# Patient Record
Sex: MALE | Race: OTHER | URBAN - METROPOLITAN AREA
[De-identification: names, ages, dates, MRNs, and addresses within clinical notes are randomized per-mention and may not be internally consistent; named-entity substitution may affect disease eponyms.]

---

## 2022-09-15 ENCOUNTER — EMERGENCY (EMERGENCY)
Facility: HOSPITAL | Age: 27
LOS: 0 days | Discharge: HOME | End: 2022-09-16
Attending: EMERGENCY MEDICINE | Admitting: EMERGENCY MEDICINE

## 2022-09-15 VITALS
HEART RATE: 80 BPM | TEMPERATURE: 98 F | DIASTOLIC BLOOD PRESSURE: 95 MMHG | RESPIRATION RATE: 18 BRPM | SYSTOLIC BLOOD PRESSURE: 133 MMHG | OXYGEN SATURATION: 100 %
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
## 2022-09-15 DIAGNOSIS — S80.211A ABRASION, RIGHT KNEE, INITIAL ENCOUNTER: ICD-10-CM

## 2022-09-15 DIAGNOSIS — S80.212A ABRASION, LEFT KNEE, INITIAL ENCOUNTER: ICD-10-CM
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
## 2022-09-15 DIAGNOSIS — S00.81XA ABRASION OF OTHER PART OF HEAD, INITIAL ENCOUNTER: ICD-10-CM
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
## 2022-09-15 DIAGNOSIS — S89.91XA UNSPECIFIED INJURY OF RIGHT LOWER LEG, INITIAL ENCOUNTER: ICD-10-CM

## 2022-09-15 DIAGNOSIS — S89.92XA UNSPECIFIED INJURY OF LEFT LOWER LEG, INITIAL ENCOUNTER: ICD-10-CM

## 2022-09-15 DIAGNOSIS — Z23 ENCOUNTER FOR IMMUNIZATION: ICD-10-CM

## 2022-09-15 PROCEDURE — 99284 EMERGENCY DEPT VISIT MOD MDM: CPT
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
## 2022-09-16 VITALS
HEART RATE: 83 BPM | TEMPERATURE: 98 F | OXYGEN SATURATION: 100 % | DIASTOLIC BLOOD PRESSURE: 88 MMHG | RESPIRATION RATE: 19 BRPM | SYSTOLIC BLOOD PRESSURE: 132 MMHG

## 2022-09-16 PROCEDURE — 73552 X-RAY EXAM OF FEMUR 2/>: CPT | Mod: 26,LT

## 2022-09-16 PROCEDURE — 73564 X-RAY EXAM KNEE 4 OR MORE: CPT | Mod: 26,50

## 2022-09-16 RX ORDER — TETANUS TOXOID, REDUCED DIPHTHERIA TOXOID AND ACELLULAR PERTUSSIS VACCINE, ADSORBED 5; 2.5; 8; 8; 2.5 [IU]/.5ML; [IU]/.5ML; UG/.5ML; UG/.5ML; UG/.5ML
0.5 SUSPENSION INTRAMUSCULAR ONCE
Refills: 0 | Status: COMPLETED | OUTPATIENT
Start: 2022-09-16 | End: 2022-09-16

## 2022-09-16 RX ADMIN — TETANUS TOXOID, REDUCED DIPHTHERIA TOXOID AND ACELLULAR PERTUSSIS VACCINE, ADSORBED 0.5 MILLILITER(S): 5; 2.5; 8; 8; 2.5 SUSPENSION INTRAMUSCULAR at 01:59

## 2022-09-16 NOTE — ED PROVIDER NOTE - CARE PROVIDER_API CALL
Tirso Dallas)  [id number]  [address] [place] [address]  Phone: [phone]  Fax: [phone]  Follow Up Time: Routine
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
## 2022-09-16 NOTE — ED PROVIDER NOTE - PHYSICAL EXAMINATION
CONSTITUTIONAL: Well-developed; well-nourished; in no acute distress.   SKIN: warm, dry. no rashes.  HEAD: Normocephalic; (+) abrasions to chin, no deep lacerations visualized. No scalp lacerations.  EYES: PERRLA, EOMI, no conjunctival erythema.  ENT: No nasal discharge; airway clear. MMM.  NECK: Supple; non tender. No c-spine ttp.  MSK/EXT: Normal ROM. No clubbing, cyanosis, or edema. No midline spinal tenderness to palpation. (+) abrasions to b/l knees, (+) ttp to b/l knees overlying abrasions and to L distal femur. NVI.  NEURO: Alert, oriented, grossly unremarkable. CNs 2-12 grossly intact. Normal motor, strength, and sensation. No focal neurological deficits.   PSYCH: Cooperative, appropriate.
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
## 2022-09-16 NOTE — ED PROVIDER NOTE - CLINICAL SUMMARY MEDICAL DECISION MAKING FREE TEXT BOX
pt evaluated s/p fall, no sutures required, imaging negative and Td updated. pt advised supportive care and follow up for evaluation and agreed. Strict return precautions advised and pt verbalized understanding.

## 2022-09-16 NOTE — ED PROVIDER NOTE - CARE PROVIDERS DIRECT ADDRESSES
[email or web address]

## 2022-09-16 NOTE — ED PROVIDER NOTE - CARE PLAN
Principal Discharge DX:	Fall  Secondary Diagnosis:	Abrasion of both knees  Secondary Diagnosis:	Abrasion of chin   1

## 2022-09-16 NOTE — ED PROVIDER NOTE - NSFOLLOWUPINSTRUCTIONS_ED_ALL_ED_FT

## 2022-09-16 NOTE — ED PROVIDER NOTE - NSFOLLOWUPCLINICS_GEN_ALL_ED_FT
[facility]  Medicine  [address], [place]   Phone: [phone]  Fax:   Follow Up Time: Routine

## 2022-09-16 NOTE — ED PROVIDER NOTE - ATTENDING CONTRIBUTION TO CARE
25 yo male presented for evaluation s/p fall. Pt reporting abrasion to chin and abrasions to knees bilaterally. Pt ambulatory without pain, no hip pain, back pain or neck pain. Denied any LOC< HA, dizziness or weakness. Pt states he was concerned he might need stitches prompting evaluation. Last Td unknown.    VITAL SIGNS: noted  CONSTITUTIONAL: Well-developed; well-nourished; in no acute distress  HEAD: Normocephalic; atraumatic  EYES: PERRL, EOM intact; conjunctiva and sclera clear  ENT: No nasal discharge; airway clear. MMM, jaw with FROM, no clicks  NECK: Supple; non tender. No anterior cervical lymphadenopathy noted  CARD: S1, S2 normal; no murmurs, gallops, or rubs. Regular rate and rhythm  RESP: CTAB/L, no wheezes, rales or rhonchi  ABD: Normal bowel sounds; soft; non-distended; non-tender; no hepatosplenomegaly. No CVA tenderness  EXT: Normal ROM. No calf tenderness or edema. Distal pulses intact  NEURO: Alert, oriented. Grossly unremarkable. No focal deficits  SKIN: Skin exam is warm and dry, abrasion noted to chin, no open laceration, bleeding controlled, no step off; + superficial abrasions to knee b/l without swelling, FROM  MS: No midline spinal tenderness

## 2022-09-16 NOTE — ED PROVIDER NOTE - PATIENT PORTAL LINK FT
You can access the FollowMyHealth Patient Portal offered by [facility] by registering at the following website: [email or web address]. By joining [facility]’s FollowMyHealth portal, you will also be able to view your health information using other applications (apps) compatible with our system.

## 2023-05-04 PROBLEM — Z00.00 ENCOUNTER FOR PREVENTIVE HEALTH EXAMINATION: Status: ACTIVE | Noted: 2023-05-04

## 2023-05-05 ENCOUNTER — APPOINTMENT (OUTPATIENT)
Dept: ORTHOPEDIC SURGERY | Facility: CLINIC | Age: 28
End: 2023-05-05

## 2023-11-15 ENCOUNTER — INPATIENT (INPATIENT)
Facility: HOSPITAL | Age: 28
LOS: 6 days | Discharge: HOME | DRG: 279 | End: 2023-11-22
Attending: STUDENT IN AN ORGANIZED HEALTH CARE EDUCATION/TRAINING PROGRAM | Admitting: STUDENT IN AN ORGANIZED HEALTH CARE EDUCATION/TRAINING PROGRAM
Payer: COMMERCIAL

## 2023-11-15 VITALS
DIASTOLIC BLOOD PRESSURE: 75 MMHG | TEMPERATURE: 98 F | WEIGHT: 169.98 LBS | OXYGEN SATURATION: 100 % | SYSTOLIC BLOOD PRESSURE: 127 MMHG | HEART RATE: 116 BPM | RESPIRATION RATE: 21 BRPM

## 2023-11-15 LAB
ALBUMIN SERPL ELPH-MCNC: 2.7 G/DL — LOW (ref 3.5–5.2)
ALBUMIN SERPL ELPH-MCNC: 2.7 G/DL — LOW (ref 3.5–5.2)
ALP SERPL-CCNC: 175 U/L — HIGH (ref 30–115)
ALP SERPL-CCNC: 175 U/L — HIGH (ref 30–115)
ALT FLD-CCNC: 15 U/L — SIGNIFICANT CHANGE UP (ref 0–41)
ALT FLD-CCNC: 15 U/L — SIGNIFICANT CHANGE UP (ref 0–41)
ANION GAP SERPL CALC-SCNC: 15 MMOL/L — HIGH (ref 7–14)
ANION GAP SERPL CALC-SCNC: 15 MMOL/L — HIGH (ref 7–14)
APPEARANCE UR: ABNORMAL
APPEARANCE UR: ABNORMAL
AST SERPL-CCNC: 158 U/L — HIGH (ref 0–41)
AST SERPL-CCNC: 158 U/L — HIGH (ref 0–41)
BACTERIA # UR AUTO: ABNORMAL /HPF
BACTERIA # UR AUTO: ABNORMAL /HPF
BASOPHILS # BLD AUTO: 0.06 K/UL — SIGNIFICANT CHANGE UP (ref 0–0.2)
BASOPHILS # BLD AUTO: 0.06 K/UL — SIGNIFICANT CHANGE UP (ref 0–0.2)
BASOPHILS NFR BLD AUTO: 0.4 % — SIGNIFICANT CHANGE UP (ref 0–1)
BASOPHILS NFR BLD AUTO: 0.4 % — SIGNIFICANT CHANGE UP (ref 0–1)
BILIRUB DIRECT SERPL-MCNC: >17.2 MG/DL — SIGNIFICANT CHANGE UP (ref 0–0.3)
BILIRUB DIRECT SERPL-MCNC: >17.2 MG/DL — SIGNIFICANT CHANGE UP (ref 0–0.3)
BILIRUB INDIRECT FLD-MCNC: SIGNIFICANT CHANGE UP MG/DL (ref 0.2–1.2)
BILIRUB INDIRECT FLD-MCNC: SIGNIFICANT CHANGE UP MG/DL (ref 0.2–1.2)
BILIRUB SERPL-MCNC: 31.1 MG/DL — CRITICAL HIGH (ref 0.2–1.2)
BILIRUB SERPL-MCNC: 31.1 MG/DL — CRITICAL HIGH (ref 0.2–1.2)
BILIRUB UR-MCNC: ABNORMAL
BILIRUB UR-MCNC: ABNORMAL
BUN SERPL-MCNC: 17 MG/DL — SIGNIFICANT CHANGE UP (ref 10–20)
BUN SERPL-MCNC: 17 MG/DL — SIGNIFICANT CHANGE UP (ref 10–20)
CALCIUM SERPL-MCNC: 8.1 MG/DL — LOW (ref 8.4–10.5)
CALCIUM SERPL-MCNC: 8.1 MG/DL — LOW (ref 8.4–10.5)
CAST: 0 /LPF — SIGNIFICANT CHANGE UP (ref 0–4)
CAST: 0 /LPF — SIGNIFICANT CHANGE UP (ref 0–4)
CHLORIDE SERPL-SCNC: 82 MMOL/L — LOW (ref 98–110)
CHLORIDE SERPL-SCNC: 82 MMOL/L — LOW (ref 98–110)
CO2 SERPL-SCNC: 22 MMOL/L — SIGNIFICANT CHANGE UP (ref 17–32)
CO2 SERPL-SCNC: 22 MMOL/L — SIGNIFICANT CHANGE UP (ref 17–32)
COLOR SPEC: SIGNIFICANT CHANGE UP
COLOR SPEC: SIGNIFICANT CHANGE UP
CREAT SERPL-MCNC: 0.7 MG/DL — SIGNIFICANT CHANGE UP (ref 0.7–1.5)
CREAT SERPL-MCNC: 0.7 MG/DL — SIGNIFICANT CHANGE UP (ref 0.7–1.5)
DIFF PNL FLD: NEGATIVE — SIGNIFICANT CHANGE UP
DIFF PNL FLD: NEGATIVE — SIGNIFICANT CHANGE UP
EGFR: 127 ML/MIN/1.73M2 — SIGNIFICANT CHANGE UP
EGFR: 127 ML/MIN/1.73M2 — SIGNIFICANT CHANGE UP
EOSINOPHIL # BLD AUTO: 0.11 K/UL — SIGNIFICANT CHANGE UP (ref 0–0.7)
EOSINOPHIL # BLD AUTO: 0.11 K/UL — SIGNIFICANT CHANGE UP (ref 0–0.7)
EOSINOPHIL NFR BLD AUTO: 0.7 % — SIGNIFICANT CHANGE UP (ref 0–8)
EOSINOPHIL NFR BLD AUTO: 0.7 % — SIGNIFICANT CHANGE UP (ref 0–8)
GLUCOSE SERPL-MCNC: 104 MG/DL — HIGH (ref 70–99)
GLUCOSE SERPL-MCNC: 104 MG/DL — HIGH (ref 70–99)
GLUCOSE UR QL: NEGATIVE MG/DL — SIGNIFICANT CHANGE UP
GLUCOSE UR QL: NEGATIVE MG/DL — SIGNIFICANT CHANGE UP
HCT VFR BLD CALC: 29.2 % — LOW (ref 42–52)
HCT VFR BLD CALC: 29.2 % — LOW (ref 42–52)
HGB BLD-MCNC: 10.7 G/DL — LOW (ref 14–18)
HGB BLD-MCNC: 10.7 G/DL — LOW (ref 14–18)
IMM GRANULOCYTES NFR BLD AUTO: 0.9 % — HIGH (ref 0.1–0.3)
IMM GRANULOCYTES NFR BLD AUTO: 0.9 % — HIGH (ref 0.1–0.3)
KETONES UR-MCNC: NEGATIVE MG/DL — SIGNIFICANT CHANGE UP
KETONES UR-MCNC: NEGATIVE MG/DL — SIGNIFICANT CHANGE UP
LEUKOCYTE ESTERASE UR-ACNC: ABNORMAL
LEUKOCYTE ESTERASE UR-ACNC: ABNORMAL
LYMPHOCYTES # BLD AUTO: 1.37 K/UL — SIGNIFICANT CHANGE UP (ref 1.2–3.4)
LYMPHOCYTES # BLD AUTO: 1.37 K/UL — SIGNIFICANT CHANGE UP (ref 1.2–3.4)
LYMPHOCYTES # BLD AUTO: 8.3 % — LOW (ref 20.5–51.1)
LYMPHOCYTES # BLD AUTO: 8.3 % — LOW (ref 20.5–51.1)
MCHC RBC-ENTMCNC: 34.7 PG — HIGH (ref 27–31)
MCHC RBC-ENTMCNC: 34.7 PG — HIGH (ref 27–31)
MCHC RBC-ENTMCNC: 36.6 G/DL — SIGNIFICANT CHANGE UP (ref 32–37)
MCHC RBC-ENTMCNC: 36.6 G/DL — SIGNIFICANT CHANGE UP (ref 32–37)
MCV RBC AUTO: 94.8 FL — HIGH (ref 80–94)
MCV RBC AUTO: 94.8 FL — HIGH (ref 80–94)
MONOCYTES # BLD AUTO: 1.37 K/UL — HIGH (ref 0.1–0.6)
MONOCYTES # BLD AUTO: 1.37 K/UL — HIGH (ref 0.1–0.6)
MONOCYTES NFR BLD AUTO: 8.3 % — SIGNIFICANT CHANGE UP (ref 1.7–9.3)
MONOCYTES NFR BLD AUTO: 8.3 % — SIGNIFICANT CHANGE UP (ref 1.7–9.3)
NEUTROPHILS # BLD AUTO: 13.43 K/UL — HIGH (ref 1.4–6.5)
NEUTROPHILS # BLD AUTO: 13.43 K/UL — HIGH (ref 1.4–6.5)
NEUTROPHILS NFR BLD AUTO: 81.4 % — HIGH (ref 42.2–75.2)
NEUTROPHILS NFR BLD AUTO: 81.4 % — HIGH (ref 42.2–75.2)
NITRITE UR-MCNC: POSITIVE
NITRITE UR-MCNC: POSITIVE
NRBC # BLD: 0 /100 WBCS — SIGNIFICANT CHANGE UP (ref 0–0)
NRBC # BLD: 0 /100 WBCS — SIGNIFICANT CHANGE UP (ref 0–0)
PH UR: 6 — SIGNIFICANT CHANGE UP (ref 5–8)
PH UR: 6 — SIGNIFICANT CHANGE UP (ref 5–8)
PLATELET # BLD AUTO: 241 K/UL — SIGNIFICANT CHANGE UP (ref 130–400)
PLATELET # BLD AUTO: 241 K/UL — SIGNIFICANT CHANGE UP (ref 130–400)
PMV BLD: 10.1 FL — SIGNIFICANT CHANGE UP (ref 7.4–10.4)
PMV BLD: 10.1 FL — SIGNIFICANT CHANGE UP (ref 7.4–10.4)
POTASSIUM SERPL-MCNC: 4.3 MMOL/L — SIGNIFICANT CHANGE UP (ref 3.5–5)
POTASSIUM SERPL-MCNC: 4.3 MMOL/L — SIGNIFICANT CHANGE UP (ref 3.5–5)
POTASSIUM SERPL-SCNC: 4.3 MMOL/L — SIGNIFICANT CHANGE UP (ref 3.5–5)
POTASSIUM SERPL-SCNC: 4.3 MMOL/L — SIGNIFICANT CHANGE UP (ref 3.5–5)
PROT SERPL-MCNC: 5.5 G/DL — LOW (ref 6–8)
PROT SERPL-MCNC: 5.5 G/DL — LOW (ref 6–8)
PROT UR-MCNC: 30 MG/DL
PROT UR-MCNC: 30 MG/DL
RBC # BLD: 3.08 M/UL — LOW (ref 4.7–6.1)
RBC # BLD: 3.08 M/UL — LOW (ref 4.7–6.1)
RBC # FLD: 19.7 % — HIGH (ref 11.5–14.5)
RBC # FLD: 19.7 % — HIGH (ref 11.5–14.5)
RBC CASTS # UR COMP ASSIST: 6 /HPF — HIGH (ref 0–4)
RBC CASTS # UR COMP ASSIST: 6 /HPF — HIGH (ref 0–4)
SODIUM SERPL-SCNC: 119 MMOL/L — CRITICAL LOW (ref 135–146)
SODIUM SERPL-SCNC: 119 MMOL/L — CRITICAL LOW (ref 135–146)
SP GR SPEC: >1.03 — HIGH (ref 1–1.03)
SP GR SPEC: >1.03 — HIGH (ref 1–1.03)
SQUAMOUS # UR AUTO: 3 /HPF — SIGNIFICANT CHANGE UP (ref 0–5)
SQUAMOUS # UR AUTO: 3 /HPF — SIGNIFICANT CHANGE UP (ref 0–5)
UROBILINOGEN FLD QL: 1 MG/DL — SIGNIFICANT CHANGE UP (ref 0.2–1)
UROBILINOGEN FLD QL: 1 MG/DL — SIGNIFICANT CHANGE UP (ref 0.2–1)
WBC # BLD: 16.49 K/UL — HIGH (ref 4.8–10.8)
WBC # BLD: 16.49 K/UL — HIGH (ref 4.8–10.8)
WBC # FLD AUTO: 16.49 K/UL — HIGH (ref 4.8–10.8)
WBC # FLD AUTO: 16.49 K/UL — HIGH (ref 4.8–10.8)
WBC UR QL: 0 /HPF — SIGNIFICANT CHANGE UP (ref 0–5)
WBC UR QL: 0 /HPF — SIGNIFICANT CHANGE UP (ref 0–5)

## 2023-11-15 PROCEDURE — 74177 CT ABD & PELVIS W/CONTRAST: CPT | Mod: 26,MA

## 2023-11-15 PROCEDURE — 76705 ECHO EXAM OF ABDOMEN: CPT | Mod: 26

## 2023-11-15 PROCEDURE — 71045 X-RAY EXAM CHEST 1 VIEW: CPT | Mod: 26

## 2023-11-15 PROCEDURE — 99285 EMERGENCY DEPT VISIT HI MDM: CPT

## 2023-11-15 NOTE — ED PROVIDER NOTE - CLINICAL SUMMARY MEDICAL DECISION MAKING FREE TEXT BOX
Laboratory results reviewed and discussed with patient. Laboratory results shows Leukocytosis and Elevated LFTs. Urine analysis shows +UTI.   Patient remained hemodynamically stable during the course of ED stay. Discussed with patient about the results of the diagnostic studies. Discussed with admitting physician and MAR, patient is admitted to Medicine for further evaluation and care.

## 2023-11-15 NOTE — ED ADULT NURSE NOTE - NSFALLHARMRISKINTERV_ED_ALL_ED

## 2023-11-15 NOTE — ED ADULT NURSE NOTE - OBJECTIVE STATEMENT
c/o c.o worsening abd distention and jaundice over last 2 weeks, PMH ETOH abuse, last drink was 3 weeks ago. pt states he has PMH HTN

## 2023-11-15 NOTE — ED PROVIDER NOTE - OBJECTIVE STATEMENT
28-year-old male with past medical history of HTN presents to the ED complaining of worsening jaundice and abdominal distention for 2 weeks.  Patient complains of associated shortness of breath that he attributes to his abdomen pushing up on his diaphragm.  Patient denies any pain.  Patient reports he has been drinking more water than usual but still feels that he is urinating small concentrated volumes.  Patient endorses a history of drinking alcohol every other day, but cut down to drinking only on weekends, and states his last drink was approximately 3 weeks ago.  Patient has not had any recent fever, headache, dizziness, chest pain, nausea, vomiting, or diarrhea.

## 2023-11-15 NOTE — ED PROVIDER NOTE - PHYSICAL EXAMINATION
PHYSICAL EXAM: I have reviewed current vital signs.  GENERAL: NAD, well-nourished; well-developed.  HEAD:  Normocephalic, atraumatic.  EYES: Scleral icterus.  ENT: MMM, no erythema/exudates.  NECK: Supple, no JVD.  CHEST/LUNG: Clear to auscultation bilaterally; no wheezes, rales, or rhonchi.  HEART: Regular rate and rhythm, normal S1 and S2; no murmurs, rubs, or gallops.  ABDOMEN: Liver palpated. Soft, nontender, nondistended.  EXTREMITIES:  2+ peripheral pulses; no clubbing, cyanosis, or edema.  PSYCH: Cooperative, appropriate, normal mood and affect.  NEUROLOGY: A&O x 3. No focal neurological deficits.   SKIN: Diffuse jaundice.

## 2023-11-15 NOTE — ED PROVIDER NOTE - DIFFERENTIAL DIAGNOSIS
Differential Diagnosis Pancreatitis, hepatic failure, obstructive uropathy, diverticulitis, colitis, abscess, bowel obstruction, bowel perforation. Electrolyte abnormalities, YANDY, and GI bleeding.

## 2023-11-15 NOTE — ED PROVIDER NOTE - CARE PLAN
1 Principal Discharge DX:	Jaundice  Secondary Diagnosis:	Hepatosplenomegaly  Secondary Diagnosis:	Hyponatremia

## 2023-11-16 ENCOUNTER — RESULT REVIEW (OUTPATIENT)
Age: 28
End: 2023-11-16

## 2023-11-16 DIAGNOSIS — R17 UNSPECIFIED JAUNDICE: ICD-10-CM

## 2023-11-16 LAB
ALBUMIN SERPL ELPH-MCNC: 2.4 G/DL — LOW (ref 3.5–5.2)
ALBUMIN SERPL ELPH-MCNC: 2.4 G/DL — LOW (ref 3.5–5.2)
ALBUMIN SERPL ELPH-MCNC: 2.5 G/DL — LOW (ref 3.5–5.2)
ALBUMIN SERPL ELPH-MCNC: 2.5 G/DL — LOW (ref 3.5–5.2)
ALP SERPL-CCNC: 162 U/L — HIGH (ref 30–115)
ALP SERPL-CCNC: 162 U/L — HIGH (ref 30–115)
ALP SERPL-CCNC: 165 U/L — HIGH (ref 30–115)
ALP SERPL-CCNC: 165 U/L — HIGH (ref 30–115)
ALT FLD-CCNC: 14 U/L — SIGNIFICANT CHANGE UP (ref 0–41)
ALT FLD-CCNC: 14 U/L — SIGNIFICANT CHANGE UP (ref 0–41)
ALT FLD-CCNC: 15 U/L — SIGNIFICANT CHANGE UP (ref 0–41)
ALT FLD-CCNC: 15 U/L — SIGNIFICANT CHANGE UP (ref 0–41)
ANION GAP SERPL CALC-SCNC: 13 MMOL/L — SIGNIFICANT CHANGE UP (ref 7–14)
ANION GAP SERPL CALC-SCNC: 15 MMOL/L — HIGH (ref 7–14)
ANION GAP SERPL CALC-SCNC: 15 MMOL/L — HIGH (ref 7–14)
ANION GAP SERPL CALC-SCNC: 16 MMOL/L — HIGH (ref 7–14)
ANION GAP SERPL CALC-SCNC: 16 MMOL/L — HIGH (ref 7–14)
APAP SERPL-MCNC: <5 UG/ML — LOW (ref 10–30)
APAP SERPL-MCNC: <5 UG/ML — LOW (ref 10–30)
APPEARANCE UR: ABNORMAL
APPEARANCE UR: ABNORMAL
APTT BLD: 40.1 SEC — HIGH (ref 27–39.2)
APTT BLD: 40.1 SEC — HIGH (ref 27–39.2)
APTT BLD: 41.8 SEC — HIGH (ref 27–39.2)
APTT BLD: 41.8 SEC — HIGH (ref 27–39.2)
AST SERPL-CCNC: 141 U/L — HIGH (ref 0–41)
AST SERPL-CCNC: 141 U/L — HIGH (ref 0–41)
AST SERPL-CCNC: 149 U/L — HIGH (ref 0–41)
AST SERPL-CCNC: 149 U/L — HIGH (ref 0–41)
B PERT IGG+IGM PNL SER: CLEAR — SIGNIFICANT CHANGE UP
B PERT IGG+IGM PNL SER: CLEAR — SIGNIFICANT CHANGE UP
BACTERIA # UR AUTO: NEGATIVE /HPF — SIGNIFICANT CHANGE UP
BACTERIA # UR AUTO: NEGATIVE /HPF — SIGNIFICANT CHANGE UP
BASOPHILS # BLD AUTO: 0.08 K/UL — SIGNIFICANT CHANGE UP (ref 0–0.2)
BASOPHILS # BLD AUTO: 0.08 K/UL — SIGNIFICANT CHANGE UP (ref 0–0.2)
BASOPHILS NFR BLD AUTO: 0.5 % — SIGNIFICANT CHANGE UP (ref 0–1)
BASOPHILS NFR BLD AUTO: 0.5 % — SIGNIFICANT CHANGE UP (ref 0–1)
BILIRUB DIRECT SERPL-MCNC: >17.2 MG/DL — HIGH (ref 0–0.3)
BILIRUB DIRECT SERPL-MCNC: >17.2 MG/DL — HIGH (ref 0–0.3)
BILIRUB DIRECT SERPL-MCNC: >17.2 MG/DL — SIGNIFICANT CHANGE UP (ref 0–0.3)
BILIRUB DIRECT SERPL-MCNC: >17.2 MG/DL — SIGNIFICANT CHANGE UP (ref 0–0.3)
BILIRUB INDIRECT FLD-MCNC: <12 MG/DL — HIGH (ref 0.2–1.2)
BILIRUB INDIRECT FLD-MCNC: <12 MG/DL — HIGH (ref 0.2–1.2)
BILIRUB INDIRECT FLD-MCNC: SIGNIFICANT CHANGE UP MG/DL (ref 0.2–1.2)
BILIRUB INDIRECT FLD-MCNC: SIGNIFICANT CHANGE UP MG/DL (ref 0.2–1.2)
BILIRUB SERPL-MCNC: 29.2 MG/DL — CRITICAL HIGH (ref 0.2–1.2)
BILIRUB SERPL-MCNC: 29.2 MG/DL — CRITICAL HIGH (ref 0.2–1.2)
BILIRUB SERPL-MCNC: 29.6 MG/DL — CRITICAL HIGH (ref 0.2–1.2)
BILIRUB UR-MCNC: ABNORMAL
BILIRUB UR-MCNC: ABNORMAL
BLD GP AB SCN SERPL QL: SIGNIFICANT CHANGE UP
BLD GP AB SCN SERPL QL: SIGNIFICANT CHANGE UP
BUN SERPL-MCNC: 19 MG/DL — SIGNIFICANT CHANGE UP (ref 10–20)
BUN SERPL-MCNC: 21 MG/DL — HIGH (ref 10–20)
BUN SERPL-MCNC: 21 MG/DL — HIGH (ref 10–20)
BUN SERPL-MCNC: 22 MG/DL — HIGH (ref 10–20)
BUN SERPL-MCNC: 22 MG/DL — HIGH (ref 10–20)
CALCIUM SERPL-MCNC: 7.9 MG/DL — LOW (ref 8.4–10.5)
CALCIUM SERPL-MCNC: 7.9 MG/DL — LOW (ref 8.4–10.5)
CALCIUM SERPL-MCNC: 8 MG/DL — LOW (ref 8.4–10.4)
CALCIUM SERPL-MCNC: 8 MG/DL — LOW (ref 8.4–10.5)
CALCIUM SERPL-MCNC: 8 MG/DL — LOW (ref 8.4–10.5)
CAST: 0 /LPF — SIGNIFICANT CHANGE UP (ref 0–4)
CAST: 0 /LPF — SIGNIFICANT CHANGE UP (ref 0–4)
CHLORIDE SERPL-SCNC: 86 MMOL/L — LOW (ref 98–110)
CHLORIDE SERPL-SCNC: 86 MMOL/L — LOW (ref 98–110)
CHLORIDE SERPL-SCNC: 87 MMOL/L — LOW (ref 98–110)
CO2 SERPL-SCNC: 22 MMOL/L — SIGNIFICANT CHANGE UP (ref 17–32)
CO2 SERPL-SCNC: 23 MMOL/L — SIGNIFICANT CHANGE UP (ref 17–32)
CO2 SERPL-SCNC: 23 MMOL/L — SIGNIFICANT CHANGE UP (ref 17–32)
COLOR FLD: SIGNIFICANT CHANGE UP
COLOR FLD: SIGNIFICANT CHANGE UP
COLOR SPEC: SIGNIFICANT CHANGE UP
COLOR SPEC: SIGNIFICANT CHANGE UP
CREAT SERPL-MCNC: 1 MG/DL — SIGNIFICANT CHANGE UP (ref 0.7–1.5)
CREAT SERPL-MCNC: 1.2 MG/DL — SIGNIFICANT CHANGE UP (ref 0.7–1.5)
CREAT SERPL-MCNC: 1.2 MG/DL — SIGNIFICANT CHANGE UP (ref 0.7–1.5)
DIFF PNL FLD: NEGATIVE — SIGNIFICANT CHANGE UP
DIFF PNL FLD: NEGATIVE — SIGNIFICANT CHANGE UP
EGFR: 104 ML/MIN/1.73M2 — SIGNIFICANT CHANGE UP
EGFR: 83 ML/MIN/1.73M2 — SIGNIFICANT CHANGE UP
EGFR: 83 ML/MIN/1.73M2 — SIGNIFICANT CHANGE UP
EOSINOPHIL # BLD AUTO: 0.13 K/UL — SIGNIFICANT CHANGE UP (ref 0–0.7)
EOSINOPHIL # BLD AUTO: 0.13 K/UL — SIGNIFICANT CHANGE UP (ref 0–0.7)
EOSINOPHIL NFR BLD AUTO: 0.8 % — SIGNIFICANT CHANGE UP (ref 0–8)
EOSINOPHIL NFR BLD AUTO: 0.8 % — SIGNIFICANT CHANGE UP (ref 0–8)
ETHANOL SERPL-MCNC: <10 MG/DL — SIGNIFICANT CHANGE UP
ETHANOL SERPL-MCNC: <10 MG/DL — SIGNIFICANT CHANGE UP
FLUID INTAKE SUBSTANCE CLASS: SIGNIFICANT CHANGE UP
FLUID INTAKE SUBSTANCE CLASS: SIGNIFICANT CHANGE UP
GLUCOSE SERPL-MCNC: 107 MG/DL — HIGH (ref 70–99)
GLUCOSE SERPL-MCNC: 107 MG/DL — HIGH (ref 70–99)
GLUCOSE SERPL-MCNC: 112 MG/DL — HIGH (ref 70–99)
GLUCOSE SERPL-MCNC: 112 MG/DL — HIGH (ref 70–99)
GLUCOSE SERPL-MCNC: 92 MG/DL — SIGNIFICANT CHANGE UP (ref 70–99)
GLUCOSE SERPL-MCNC: 92 MG/DL — SIGNIFICANT CHANGE UP (ref 70–99)
GLUCOSE SERPL-MCNC: 95 MG/DL — SIGNIFICANT CHANGE UP (ref 70–99)
GLUCOSE SERPL-MCNC: 95 MG/DL — SIGNIFICANT CHANGE UP (ref 70–99)
GLUCOSE UR QL: NEGATIVE MG/DL — SIGNIFICANT CHANGE UP
GLUCOSE UR QL: NEGATIVE MG/DL — SIGNIFICANT CHANGE UP
HCT VFR BLD CALC: 27.5 % — LOW (ref 42–52)
HCT VFR BLD CALC: 27.5 % — LOW (ref 42–52)
HGB BLD-MCNC: 10 G/DL — LOW (ref 14–18)
HGB BLD-MCNC: 10 G/DL — LOW (ref 14–18)
IMM GRANULOCYTES NFR BLD AUTO: 1.2 % — HIGH (ref 0.1–0.3)
IMM GRANULOCYTES NFR BLD AUTO: 1.2 % — HIGH (ref 0.1–0.3)
INR BLD: 2.49 RATIO — HIGH (ref 0.65–1.3)
INR BLD: 2.49 RATIO — HIGH (ref 0.65–1.3)
INR BLD: 2.69 RATIO — HIGH (ref 0.65–1.3)
INR BLD: 2.69 RATIO — HIGH (ref 0.65–1.3)
IRON SATN MFR SERPL: 64 UG/DL — SIGNIFICANT CHANGE UP (ref 35–150)
IRON SATN MFR SERPL: 64 UG/DL — SIGNIFICANT CHANGE UP (ref 35–150)
KETONES UR-MCNC: NEGATIVE MG/DL — SIGNIFICANT CHANGE UP
KETONES UR-MCNC: NEGATIVE MG/DL — SIGNIFICANT CHANGE UP
LACTATE SERPL-SCNC: 1.1 MMOL/L — SIGNIFICANT CHANGE UP (ref 0.7–2)
LACTATE SERPL-SCNC: 1.1 MMOL/L — SIGNIFICANT CHANGE UP (ref 0.7–2)
LEUKOCYTE ESTERASE UR-ACNC: ABNORMAL
LEUKOCYTE ESTERASE UR-ACNC: ABNORMAL
LYMPHOCYTES # BLD AUTO: 1.28 K/UL — SIGNIFICANT CHANGE UP (ref 1.2–3.4)
LYMPHOCYTES # BLD AUTO: 1.28 K/UL — SIGNIFICANT CHANGE UP (ref 1.2–3.4)
LYMPHOCYTES # BLD AUTO: 8.3 % — LOW (ref 20.5–51.1)
LYMPHOCYTES # BLD AUTO: 8.3 % — LOW (ref 20.5–51.1)
LYMPHOCYTES # FLD: 12 — SIGNIFICANT CHANGE UP
LYMPHOCYTES # FLD: 12 — SIGNIFICANT CHANGE UP
MAGNESIUM SERPL-MCNC: 2.5 MG/DL — HIGH (ref 1.8–2.4)
MAGNESIUM SERPL-MCNC: 2.5 MG/DL — HIGH (ref 1.8–2.4)
MAGNESIUM SERPL-MCNC: 2.6 MG/DL — HIGH (ref 1.8–2.4)
MAGNESIUM SERPL-MCNC: 2.6 MG/DL — HIGH (ref 1.8–2.4)
MCHC RBC-ENTMCNC: 34.6 PG — HIGH (ref 27–31)
MCHC RBC-ENTMCNC: 34.6 PG — HIGH (ref 27–31)
MCHC RBC-ENTMCNC: 36.4 G/DL — SIGNIFICANT CHANGE UP (ref 32–37)
MCHC RBC-ENTMCNC: 36.4 G/DL — SIGNIFICANT CHANGE UP (ref 32–37)
MCV RBC AUTO: 95.2 FL — HIGH (ref 80–94)
MCV RBC AUTO: 95.2 FL — HIGH (ref 80–94)
MESOTHL CELL # FLD: 1 % — SIGNIFICANT CHANGE UP
MESOTHL CELL # FLD: 1 % — SIGNIFICANT CHANGE UP
MONOCYTES # BLD AUTO: 1.32 K/UL — HIGH (ref 0.1–0.6)
MONOCYTES # BLD AUTO: 1.32 K/UL — HIGH (ref 0.1–0.6)
MONOCYTES NFR BLD AUTO: 8.6 % — SIGNIFICANT CHANGE UP (ref 1.7–9.3)
MONOCYTES NFR BLD AUTO: 8.6 % — SIGNIFICANT CHANGE UP (ref 1.7–9.3)
MONOS+MACROS # FLD: 84 % — SIGNIFICANT CHANGE UP
MONOS+MACROS # FLD: 84 % — SIGNIFICANT CHANGE UP
NEUTROPHILS # BLD AUTO: 12.39 K/UL — HIGH (ref 1.4–6.5)
NEUTROPHILS # BLD AUTO: 12.39 K/UL — HIGH (ref 1.4–6.5)
NEUTROPHILS NFR BLD AUTO: 80.6 % — HIGH (ref 42.2–75.2)
NEUTROPHILS NFR BLD AUTO: 80.6 % — HIGH (ref 42.2–75.2)
NEUTROPHILS-BODY FLUID: 3 % — SIGNIFICANT CHANGE UP
NEUTROPHILS-BODY FLUID: 3 % — SIGNIFICANT CHANGE UP
NITRITE UR-MCNC: POSITIVE
NITRITE UR-MCNC: POSITIVE
NRBC # BLD: 0 /100 WBCS — SIGNIFICANT CHANGE UP (ref 0–0)
NRBC # BLD: 0 /100 WBCS — SIGNIFICANT CHANGE UP (ref 0–0)
OSMOLALITY SERPL: 264 MOS/KG — LOW (ref 275–300)
OSMOLALITY SERPL: 264 MOS/KG — LOW (ref 275–300)
OSMOLALITY UR: 616 MOS/KG — SIGNIFICANT CHANGE UP (ref 50–1200)
OSMOLALITY UR: 616 MOS/KG — SIGNIFICANT CHANGE UP (ref 50–1200)
PH UR: 6 — SIGNIFICANT CHANGE UP (ref 5–8)
PH UR: 6 — SIGNIFICANT CHANGE UP (ref 5–8)
PHOSPHATE SERPL-MCNC: 3.1 MG/DL — SIGNIFICANT CHANGE UP (ref 2.1–4.9)
PHOSPHATE SERPL-MCNC: 3.1 MG/DL — SIGNIFICANT CHANGE UP (ref 2.1–4.9)
PHOSPHATE SERPL-MCNC: 3.5 MG/DL — SIGNIFICANT CHANGE UP (ref 2.1–4.9)
PHOSPHATE SERPL-MCNC: 3.5 MG/DL — SIGNIFICANT CHANGE UP (ref 2.1–4.9)
PLATELET # BLD AUTO: 227 K/UL — SIGNIFICANT CHANGE UP (ref 130–400)
PLATELET # BLD AUTO: 227 K/UL — SIGNIFICANT CHANGE UP (ref 130–400)
PMV BLD: 9.7 FL — SIGNIFICANT CHANGE UP (ref 7.4–10.4)
PMV BLD: 9.7 FL — SIGNIFICANT CHANGE UP (ref 7.4–10.4)
POTASSIUM SERPL-MCNC: 3.8 MMOL/L — SIGNIFICANT CHANGE UP (ref 3.5–5)
POTASSIUM SERPL-MCNC: 3.8 MMOL/L — SIGNIFICANT CHANGE UP (ref 3.5–5)
POTASSIUM SERPL-MCNC: 3.9 MMOL/L — SIGNIFICANT CHANGE UP (ref 3.5–5)
POTASSIUM SERPL-MCNC: 4 MMOL/L — SIGNIFICANT CHANGE UP (ref 3.5–5)
POTASSIUM SERPL-MCNC: 4 MMOL/L — SIGNIFICANT CHANGE UP (ref 3.5–5)
POTASSIUM SERPL-SCNC: 3.8 MMOL/L — SIGNIFICANT CHANGE UP (ref 3.5–5)
POTASSIUM SERPL-SCNC: 3.8 MMOL/L — SIGNIFICANT CHANGE UP (ref 3.5–5)
POTASSIUM SERPL-SCNC: 3.9 MMOL/L — SIGNIFICANT CHANGE UP (ref 3.5–5)
POTASSIUM SERPL-SCNC: 4 MMOL/L — SIGNIFICANT CHANGE UP (ref 3.5–5)
POTASSIUM SERPL-SCNC: 4 MMOL/L — SIGNIFICANT CHANGE UP (ref 3.5–5)
POTASSIUM UR-SCNC: 40 MMOL/L — SIGNIFICANT CHANGE UP
POTASSIUM UR-SCNC: 40 MMOL/L — SIGNIFICANT CHANGE UP
PROT SERPL-MCNC: 5.1 G/DL — LOW (ref 6–8)
PROT SERPL-MCNC: 5.1 G/DL — LOW (ref 6–8)
PROT SERPL-MCNC: 5.3 G/DL — LOW (ref 6–8)
PROT SERPL-MCNC: 5.3 G/DL — LOW (ref 6–8)
PROT UR-MCNC: 30 MG/DL
PROT UR-MCNC: 30 MG/DL
PROTHROM AB SERPL-ACNC: 28.6 SEC — HIGH (ref 9.95–12.87)
PROTHROM AB SERPL-ACNC: 28.6 SEC — HIGH (ref 9.95–12.87)
PROTHROM AB SERPL-ACNC: 31 SEC — HIGH (ref 9.95–12.87)
PROTHROM AB SERPL-ACNC: 31 SEC — HIGH (ref 9.95–12.87)
RBC # BLD: 2.89 M/UL — LOW (ref 4.7–6.1)
RBC # FLD: 19.7 % — HIGH (ref 11.5–14.5)
RBC # FLD: 19.7 % — HIGH (ref 11.5–14.5)
RBC CASTS # UR COMP ASSIST: 4 /HPF — SIGNIFICANT CHANGE UP (ref 0–4)
RBC CASTS # UR COMP ASSIST: 4 /HPF — SIGNIFICANT CHANGE UP (ref 0–4)
RCV VOL RI: 0 /UL — SIGNIFICANT CHANGE UP (ref 0–0)
RCV VOL RI: 0 /UL — SIGNIFICANT CHANGE UP (ref 0–0)
RETICS #: 125.7 K/UL — HIGH (ref 25–125)
RETICS #: 125.7 K/UL — HIGH (ref 25–125)
RETICS/RBC NFR: 4.4 % — HIGH (ref 0.5–1.5)
RETICS/RBC NFR: 4.4 % — HIGH (ref 0.5–1.5)
SALICYLATES SERPL-MCNC: <0.3 MG/DL — LOW (ref 4–30)
SALICYLATES SERPL-MCNC: <0.3 MG/DL — LOW (ref 4–30)
SODIUM SERPL-SCNC: 122 MMOL/L — LOW (ref 135–146)
SODIUM SERPL-SCNC: 124 MMOL/L — LOW (ref 135–146)
SODIUM SERPL-SCNC: 124 MMOL/L — LOW (ref 135–146)
SODIUM SERPL-SCNC: 125 MMOL/L — LOW (ref 135–146)
SODIUM SERPL-SCNC: 125 MMOL/L — LOW (ref 135–146)
SODIUM UR-SCNC: 20 MMOL/L — SIGNIFICANT CHANGE UP
SODIUM UR-SCNC: 20 MMOL/L — SIGNIFICANT CHANGE UP
SP GR SPEC: >1.03 — HIGH (ref 1–1.03)
SP GR SPEC: >1.03 — HIGH (ref 1–1.03)
SQUAMOUS # UR AUTO: 0 /HPF — SIGNIFICANT CHANGE UP (ref 0–5)
SQUAMOUS # UR AUTO: 0 /HPF — SIGNIFICANT CHANGE UP (ref 0–5)
TOTAL NUCLEATED CELL COUNT, BODY FLUID: 81 /UL — SIGNIFICANT CHANGE UP
TOTAL NUCLEATED CELL COUNT, BODY FLUID: 81 /UL — SIGNIFICANT CHANGE UP
TSH SERPL-MCNC: 1.96 UIU/ML — SIGNIFICANT CHANGE UP (ref 0.27–4.2)
TSH SERPL-MCNC: 1.96 UIU/ML — SIGNIFICANT CHANGE UP (ref 0.27–4.2)
TUBE TYPE: SIGNIFICANT CHANGE UP
TUBE TYPE: SIGNIFICANT CHANGE UP
UROBILINOGEN FLD QL: 1 MG/DL — SIGNIFICANT CHANGE UP (ref 0.2–1)
UROBILINOGEN FLD QL: 1 MG/DL — SIGNIFICANT CHANGE UP (ref 0.2–1)
WBC # BLD: 15.38 K/UL — HIGH (ref 4.8–10.8)
WBC # BLD: 15.38 K/UL — HIGH (ref 4.8–10.8)
WBC # FLD AUTO: 15.38 K/UL — HIGH (ref 4.8–10.8)
WBC # FLD AUTO: 15.38 K/UL — HIGH (ref 4.8–10.8)
WBC UR QL: 2 /HPF — SIGNIFICANT CHANGE UP (ref 0–5)
WBC UR QL: 2 /HPF — SIGNIFICANT CHANGE UP (ref 0–5)

## 2023-11-16 PROCEDURE — 83935 ASSAY OF URINE OSMOLALITY: CPT

## 2023-11-16 PROCEDURE — 84133 ASSAY OF URINE POTASSIUM: CPT

## 2023-11-16 PROCEDURE — 87070 CULTURE OTHR SPECIMN AEROBIC: CPT

## 2023-11-16 PROCEDURE — 76770 US EXAM ABDO BACK WALL COMP: CPT

## 2023-11-16 PROCEDURE — 82390 ASSAY OF CERULOPLASMIN: CPT

## 2023-11-16 PROCEDURE — 86645 CMV ANTIBODY IGM: CPT

## 2023-11-16 PROCEDURE — 87075 CULTR BACTERIA EXCEPT BLOOD: CPT

## 2023-11-16 PROCEDURE — 82784 ASSAY IGA/IGD/IGG/IGM EACH: CPT

## 2023-11-16 PROCEDURE — 82247 BILIRUBIN TOTAL: CPT

## 2023-11-16 PROCEDURE — 86038 ANTINUCLEAR ANTIBODIES: CPT

## 2023-11-16 PROCEDURE — 84466 ASSAY OF TRANSFERRIN: CPT

## 2023-11-16 PROCEDURE — 86900 BLOOD TYPING SEROLOGIC ABO: CPT

## 2023-11-16 PROCEDURE — 87521 HEPATITIS C PROBE&RVRS TRNSC: CPT

## 2023-11-16 PROCEDURE — 86735 MUMPS ANTIBODY: CPT

## 2023-11-16 PROCEDURE — 85610 PROTHROMBIN TIME: CPT

## 2023-11-16 PROCEDURE — 83520 IMMUNOASSAY QUANT NOS NONAB: CPT

## 2023-11-16 PROCEDURE — 84300 ASSAY OF URINE SODIUM: CPT

## 2023-11-16 PROCEDURE — 87102 FUNGUS ISOLATION CULTURE: CPT

## 2023-11-16 PROCEDURE — 86376 MICROSOMAL ANTIBODY EACH: CPT

## 2023-11-16 PROCEDURE — 88305 TISSUE EXAM BY PATHOLOGIST: CPT | Mod: 26

## 2023-11-16 PROCEDURE — 84443 ASSAY THYROID STIM HORMONE: CPT

## 2023-11-16 PROCEDURE — 87205 SMEAR GRAM STAIN: CPT

## 2023-11-16 PROCEDURE — 86708 HEPATITIS A ANTIBODY: CPT

## 2023-11-16 PROCEDURE — 84100 ASSAY OF PHOSPHORUS: CPT

## 2023-11-16 PROCEDURE — 81001 URINALYSIS AUTO W/SCOPE: CPT

## 2023-11-16 PROCEDURE — 82607 VITAMIN B-12: CPT

## 2023-11-16 PROCEDURE — 80321 ALCOHOLS BIOMARKERS 1OR 2: CPT

## 2023-11-16 PROCEDURE — 86381 MITOCHONDRIAL ANTIBODY EACH: CPT

## 2023-11-16 PROCEDURE — 86777 TOXOPLASMA ANTIBODY: CPT

## 2023-11-16 PROCEDURE — 85730 THROMBOPLASTIN TIME PARTIAL: CPT

## 2023-11-16 PROCEDURE — 88305 TISSUE EXAM BY PATHOLOGIST: CPT

## 2023-11-16 PROCEDURE — 83605 ASSAY OF LACTIC ACID: CPT

## 2023-11-16 PROCEDURE — 85025 COMPLETE CBC W/AUTO DIFF WBC: CPT

## 2023-11-16 PROCEDURE — 88112 CYTOPATH CELL ENHANCE TECH: CPT

## 2023-11-16 PROCEDURE — 80307 DRUG TEST PRSMV CHEM ANLYZR: CPT

## 2023-11-16 PROCEDURE — 82042 OTHER SOURCE ALBUMIN QUAN EA: CPT

## 2023-11-16 PROCEDURE — 89051 BODY FLUID CELL COUNT: CPT

## 2023-11-16 PROCEDURE — 85027 COMPLETE CBC AUTOMATED: CPT

## 2023-11-16 PROCEDURE — 84156 ASSAY OF PROTEIN URINE: CPT

## 2023-11-16 PROCEDURE — 99223 1ST HOSP IP/OBS HIGH 75: CPT

## 2023-11-16 PROCEDURE — 86901 BLOOD TYPING SEROLOGIC RH(D): CPT

## 2023-11-16 PROCEDURE — 84145 PROCALCITONIN (PCT): CPT

## 2023-11-16 PROCEDURE — 86644 CMV ANTIBODY: CPT

## 2023-11-16 PROCEDURE — 80076 HEPATIC FUNCTION PANEL: CPT

## 2023-11-16 PROCEDURE — 86663 EPSTEIN-BARR ANTIBODY: CPT

## 2023-11-16 PROCEDURE — 80053 COMPREHEN METABOLIC PANEL: CPT

## 2023-11-16 PROCEDURE — 82728 ASSAY OF FERRITIN: CPT

## 2023-11-16 PROCEDURE — 83930 ASSAY OF BLOOD OSMOLALITY: CPT

## 2023-11-16 PROCEDURE — 83540 ASSAY OF IRON: CPT

## 2023-11-16 PROCEDURE — 82962 GLUCOSE BLOOD TEST: CPT

## 2023-11-16 PROCEDURE — 86790 VIRUS ANTIBODY NOS: CPT

## 2023-11-16 PROCEDURE — 87040 BLOOD CULTURE FOR BACTERIA: CPT

## 2023-11-16 PROCEDURE — 86778 TOXOPLASMA ANTIBODY IGM: CPT

## 2023-11-16 PROCEDURE — 86706 HEP B SURFACE ANTIBODY: CPT

## 2023-11-16 PROCEDURE — 86765 RUBEOLA ANTIBODY: CPT

## 2023-11-16 PROCEDURE — 82140 ASSAY OF AMMONIA: CPT

## 2023-11-16 PROCEDURE — 82945 GLUCOSE OTHER FLUID: CPT

## 2023-11-16 PROCEDURE — 76700 US EXAM ABDOM COMPLETE: CPT

## 2023-11-16 PROCEDURE — 80074 ACUTE HEPATITIS PANEL: CPT

## 2023-11-16 PROCEDURE — 86255 FLUORESCENT ANTIBODY SCREEN: CPT

## 2023-11-16 PROCEDURE — 87389 HIV-1 AG W/HIV-1&-2 AB AG IA: CPT

## 2023-11-16 PROCEDURE — 0225U NFCT DS DNA&RNA 21 SARSCOV2: CPT

## 2023-11-16 PROCEDURE — 82248 BILIRUBIN DIRECT: CPT

## 2023-11-16 PROCEDURE — 86704 HEP B CORE ANTIBODY TOTAL: CPT

## 2023-11-16 PROCEDURE — 88112 CYTOPATH CELL ENHANCE TECH: CPT | Mod: 26

## 2023-11-16 PROCEDURE — 80048 BASIC METABOLIC PNL TOTAL CA: CPT

## 2023-11-16 PROCEDURE — 84157 ASSAY OF PROTEIN OTHER: CPT

## 2023-11-16 PROCEDURE — 74018 RADEX ABDOMEN 1 VIEW: CPT

## 2023-11-16 PROCEDURE — 82746 ASSAY OF FOLIC ACID SERUM: CPT

## 2023-11-16 PROCEDURE — 83550 IRON BINDING TEST: CPT

## 2023-11-16 PROCEDURE — P9047: CPT

## 2023-11-16 PROCEDURE — 83735 ASSAY OF MAGNESIUM: CPT

## 2023-11-16 PROCEDURE — 86664 EPSTEIN-BARR NUCLEAR ANTIGEN: CPT

## 2023-11-16 PROCEDURE — 86682 HELMINTH ANTIBODY: CPT

## 2023-11-16 PROCEDURE — 86850 RBC ANTIBODY SCREEN: CPT

## 2023-11-16 PROCEDURE — 86480 TB TEST CELL IMMUN MEASURE: CPT

## 2023-11-16 PROCEDURE — 83615 LACTATE (LD) (LDH) ENZYME: CPT

## 2023-11-16 PROCEDURE — 82570 ASSAY OF URINE CREATININE: CPT

## 2023-11-16 PROCEDURE — 84540 ASSAY OF URINE/UREA-N: CPT

## 2023-11-16 PROCEDURE — 86780 TREPONEMA PALLIDUM: CPT

## 2023-11-16 PROCEDURE — 85045 AUTOMATED RETICULOCYTE COUNT: CPT

## 2023-11-16 PROCEDURE — 86709 HEPATITIS A IGM ANTIBODY: CPT

## 2023-11-16 PROCEDURE — 86762 RUBELLA ANTIBODY: CPT

## 2023-11-16 PROCEDURE — 86665 EPSTEIN-BARR CAPSID VCA: CPT

## 2023-11-16 PROCEDURE — 36415 COLL VENOUS BLD VENIPUNCTURE: CPT

## 2023-11-16 PROCEDURE — 87522 HEPATITIS C REVRS TRNSCRPJ: CPT

## 2023-11-16 RX ORDER — PANTOPRAZOLE SODIUM 20 MG/1
40 TABLET, DELAYED RELEASE ORAL
Refills: 0 | Status: DISCONTINUED | OUTPATIENT
Start: 2023-11-16 | End: 2023-11-22

## 2023-11-16 RX ORDER — LACTULOSE 10 G/15ML
10 SOLUTION ORAL DAILY
Refills: 0 | Status: DISCONTINUED | OUTPATIENT
Start: 2023-11-16 | End: 2023-11-17

## 2023-11-16 RX ORDER — TRAMADOL HYDROCHLORIDE 50 MG/1
25 TABLET ORAL EVERY 8 HOURS
Refills: 0 | Status: DISCONTINUED | OUTPATIENT
Start: 2023-11-16 | End: 2023-11-22

## 2023-11-16 RX ORDER — METRONIDAZOLE 500 MG
500 TABLET ORAL EVERY 8 HOURS
Refills: 0 | Status: DISCONTINUED | OUTPATIENT
Start: 2023-11-16 | End: 2023-11-19

## 2023-11-16 RX ORDER — ALBUMIN HUMAN 25 %
300 VIAL (ML) INTRAVENOUS ONCE
Refills: 0 | Status: COMPLETED | OUTPATIENT
Start: 2023-11-16 | End: 2023-11-16

## 2023-11-16 RX ORDER — METRONIDAZOLE 500 MG
TABLET ORAL
Refills: 0 | Status: DISCONTINUED | OUTPATIENT
Start: 2023-11-16 | End: 2023-11-19

## 2023-11-16 RX ORDER — PHYTONADIONE (VIT K1) 5 MG
10 TABLET ORAL DAILY
Refills: 0 | Status: COMPLETED | OUTPATIENT
Start: 2023-11-16 | End: 2023-11-18

## 2023-11-16 RX ORDER — ALBUMIN HUMAN 25 %
100 VIAL (ML) INTRAVENOUS ONCE
Refills: 0 | Status: DISCONTINUED | OUTPATIENT
Start: 2023-11-17 | End: 2023-11-17

## 2023-11-16 RX ORDER — CEFTRIAXONE 500 MG/1
1000 INJECTION, POWDER, FOR SOLUTION INTRAMUSCULAR; INTRAVENOUS EVERY 24 HOURS
Refills: 0 | Status: DISCONTINUED | OUTPATIENT
Start: 2023-11-16 | End: 2023-11-19

## 2023-11-16 RX ORDER — METRONIDAZOLE 500 MG
500 TABLET ORAL ONCE
Refills: 0 | Status: COMPLETED | OUTPATIENT
Start: 2023-11-16 | End: 2023-11-16

## 2023-11-16 RX ADMIN — CEFTRIAXONE 100 MILLIGRAM(S): 500 INJECTION, POWDER, FOR SOLUTION INTRAMUSCULAR; INTRAVENOUS at 03:42

## 2023-11-16 RX ADMIN — Medication 100 MILLIGRAM(S): at 16:00

## 2023-11-16 RX ADMIN — PANTOPRAZOLE SODIUM 40 MILLIGRAM(S): 20 TABLET, DELAYED RELEASE ORAL at 07:37

## 2023-11-16 RX ADMIN — LACTULOSE 10 GRAM(S): 10 SOLUTION ORAL at 13:24

## 2023-11-16 RX ADMIN — Medication 100 MILLIGRAM(S): at 03:41

## 2023-11-16 RX ADMIN — Medication 100 MILLIGRAM(S): at 22:29

## 2023-11-16 RX ADMIN — Medication 100 MILLILITER(S): at 14:27

## 2023-11-16 RX ADMIN — Medication 102 MILLIGRAM(S): at 13:24

## 2023-11-16 NOTE — CONSULT NOTE ADULT - ATTENDING COMMENTS
28 y o M of [place] origin with AUD admitted with jaundice and abdominal distension.   Has been having abdominal distension for few weeks and jaundice over several weeks. Reports that jaundice improving.   Stopped alcohol about 4 weeks ago. Was drinking 4 beers per day.   Lives with mother and brother. Brother volunteered to be a donor. 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 Works as [employer] .   Icteric  Abdomen soft hepatomegaly+  Ext no edema  No asterixis   Severe alcoholic hepatitis with coagulopathy MELD > 30 MDF > 32  Hyponatremia  YANDY  IV Human albumin 25 % 1g/kg  Hold prednisone for now  Fluid restrict to 1000 ml  Vitamin K  Work up for acute hepatitis   Urine osmolality and lytes and serum osmolality   TSH S cortisol  Echo  Monitor MELD labs  Offered transfer to LT center but opted to be monitored here.  If encephalopathy or worsening coagulopathy will need urgent transfer
IMPRESSION:    Hyponatremia in setting of Liver Disease  Portal Hypertension/Hepatomegaly/Right Paracolic Varices/Alcoholic Steatosis   Hepatorenal Syndrome   Possible Diffuse Colitis  Transaminitis  Abdominal/Pelvic Ascites  Bilateral lower lobe partial atelectasis    Plan as outlined above
YANDY and Hypona in settign of Liver failure    both slightyl improved   cotn free water restriction  started on albumin   send Urein Na an OSm  cehckl Uric acid    will follow

## 2023-11-16 NOTE — CONSULT NOTE ADULT - SUBJECTIVE AND OBJECTIVE BOX
-------------------------------------------------------------------------------------------------------------------------------------------------------------------------------  HEPATOLOGY INITIAL CONSULT  -------------------------------------------------------------------------------------------------------------------------------------------------------------------------------    HPI:  29 yo male previously healthy presented today for worsening jaundice and abdominal distention   that started 2 weeks ago. Since his jaundice started, the patient began drinking a lot of fluids minimum 4L / day, his abdominal distention kept getting worse and his urine became darker, with oliguria. He reports SOB attributed to his abdomen pushing up on his diaphragm. Last BM yesterday, patient passing gas.  Patient denies any pain, fever, or any other symptoms  Social hx: former smoker stopped 2 years ago, has 3-4 PY.   alcohol: drinks every other day 4-5 beers, however last drink was 3 weeks ago.   No drugs., no allergies.     In the ED vitals sinus tachycardia   labs remarkable for WBC 16K, hb 10.7 (no BL), INR 5.69 , Na 119, AG 15, total bili 31.1 , aLP 175,   UA +VE  -ve alcohol level, acetaminophen and salicylate levels.    - CT abd pelv: Diffuse large amount of abdominal and pelvic ascites. Hepatosplenomegaly with right paracolic varices, findings suspicious for portal hypertension. Diffuse colonic wall thickening, possibly related to a diffuse colitis versus this severely edematous state of the patient. Nonspecific dilated loops of small bowel without definite evidence of obstruction.   Partial collapse of the right lower lobe, left lower lobe and right middle lobe.    - RUQ US : 1.  Enlarged fatty infiltrated liver (hepatomegaly with diffuse steatosis).  2.  Sludge within the gallbladder lumen which has a thickened edematous wall. No echogenic stones. Negative sonographic Carter's sign. Thickened edematous gallbladder wall is a nonspecific finding possibly related to volume status in this patient. 3.  Ascites is noted.    Patient admitted to SDU for severe hyponatremia, large abdominal ascites, and liver injury.   hepatology consulted for liver injury    PAST MEDICAL & SURGICAL HISTORY:  No significant past surgical history          Review of Systems: Negative except as per HPI.    MEDICATIONS  (STANDING):  cefTRIAXone   IVPB 1000 milliGRAM(s) IV Intermittent every 24 hours  lactulose Syrup 10 Gram(s) Oral daily  metroNIDAZOLE  IVPB      metroNIDAZOLE  IVPB 500 milliGRAM(s) IV Intermittent every 8 hours  pantoprazole    Tablet 40 milliGRAM(s) Oral before breakfast  phytonadione  IVPB 10 milliGRAM(s) IV Intermittent daily    MEDICATIONS  (PRN):  LORazepam   Injectable 2 milliGRAM(s) IntraMuscular every 2 hours PRN Symptom-triggered: 2 point increase in CIWA -Ar score and a total score of 7 or LESS  traMADol 25 milliGRAM(s) Oral every 8 hours PRN Severe Pain (7 - 10)      ALLERGIES:  nkda    SOCIAL HISTORY:  - Alcohol: as mentioned inhpi  - Recreational drug use: none     FAMILY HISTORY:  no fhx of gi canceers    Vital Signs Last 24 Hrs  T(C): 36.8 (16 Nov 2023 07:55), Max: 37.3 (16 Nov 2023 00:06)  T(F): 98.3 (16 Nov 2023 07:55), Max: 99.2 (16 Nov 2023 00:06)  HR: 101 (16 Nov 2023 12:00) (101 - 116)  BP: 103/58 (16 Nov 2023 12:00) (103/58 - 127/75)  BP(mean): 74 (16 Nov 2023 12:00) (74 - 84)  RR: 16 (16 Nov 2023 12:00) (16 - 21)  SpO2: 99% (16 Nov 2023 12:00) (97% - 100%)    Parameters below as of 16 Nov 2023 12:00  Patient On (Oxygen Delivery Method): room air        PHYSICAL EXAM:  Constitutional: no acute distress  Eyes:  icterus  Neck: no masses, no LAD  Respiratory: normal inspiratory effort; no wheezing or crackles  Cardiovascular: RRR, normal S1/S2, no murmurs/rubs/gallops  Gastrointestinal: soft, nondistended, nontender, +BS  Extremities: no LE edema  Neurological: AAOx3, no asterixis  Skin: no rashes, bruises, petechiae    LABS:                        10.0   15.38 )-----------( 227      ( 16 Nov 2023 06:59 )             27.5     11-16    122<L>  |  87<L>  |  19  ----------------------------<  107<H>  3.8   |  22  |  1.0    Ca    7.9<L>      16 Nov 2023 11:26  Phos  3.1     11-16  Mg     2.5     11-16    TPro  5.1<L>  /  Alb  2.4<L>  /  TBili  29.2<HH>  /  DBili  >17.2<H>  /  AST  141<H>  /  ALT  14  /  AlkPhos  165<H>  11-16    PT/INR - ( 16 Nov 2023 06:59 )   PT: 28.60 sec;   INR: 2.49 ratio         PTT - ( 16 Nov 2023 06:59 )  PTT:40.1 sec  LIVER FUNCTIONS - ( 16 Nov 2023 11:26 )  Alb: 2.4 g/dL / Pro: 5.1 g/dL / ALK PHOS: 165 U/L / ALT: 14 U/L / AST: 141 U/L / GGT: x             RADIOLOGY & ADDITIONAL STUDIES:

## 2023-11-16 NOTE — H&P ADULT - HISTORY OF PRESENT ILLNESS
29 yo male with hx of HTN presents to the ED complaining of worsening jaundice and abdominal distention for 2 weeks.  Patient complains of associated shortness of breath that he attributes to his abdomen pushing up on his diaphragm.  Patient denies any pain.  Patient reports he has been drinking more water than usual but still feels that he is urinating small concentrated volumes.  Patient endorses a history of drinking alcohol every other day, but cut down to drinking only on weekends, and states his last drink was approximately 3 weeks ago.     In the ED vitals sinus tachycardia   labs remarkable for WBC 16K, hb 10.7 (no BL)  INR 5.69 , Na 119, AG 15, total bili 31.1 , aLP 175,   UA +VE  -ve alcohol level, acetaminophen and salicylate levels.    - CT abd pelv: Diffuse large amount of abdominal and pelvic ascites. Hepatosplenomegaly with right paracolic varices, findings suspicious for portal hypertension.   Diffuse colonic wall thickening, possibly related to a diffuse colitis versus this severely edematous state of the patient.   Nonspecific dilated loops of small bowel without definite evidence of obstruction.   Partial collapse of the right lower lobe, left lower lobe and right middle lobe.    - RUQ US : 1.  Enlarged fatty infiltrated liver (hepatomegaly with diffuse steatosis).  2.  Sludge within the gallbladder lumen which has a thickened edematous wall. No echogenic stones. Negative sonographic Carter's sign. Thickened edematous gallbladder wall is a nonspecific finding possibly related to volume status in this patient. 3.  Ascites is noted.    Patient admitted to SDU for severe hyponatremia, large abdominal ascites, and cholestatic liver injury.    27 yo male previously healthy presented today for worsening jaundice and abdominal distended that started 2 weeks ago. Since his jaundice started, the patient began drinking a lot of fluids minimum 4L / day, his abdominal distention kept getting worse and his urine became darker, with oliguria. He reports SOB attributed to his abdomen pushing up on his diaphragm. Last BM yesterday, patient passing gas.  Patient denies any pain, fever, or any other signs of symptoms.   Social hx: former smoker stopped 2 years ago, has 3-4 PY.   alcohol: drinks every other day 4-5 beers, however last drink was 3 weeks ago.   No drugs., no allergies.     In the ED vitals sinus tachycardia   labs remarkable for WBC 16K, hb 10.7 (no BL), INR 5.69 , Na 119, AG 15, total bili 31.1 , aLP 175,   UA +VE  -ve alcohol level, acetaminophen and salicylate levels.    - CT abd pelv: Diffuse large amount of abdominal and pelvic ascites. Hepatosplenomegaly with right paracolic varices, findings suspicious for portal hypertension. Diffuse colonic wall thickening, possibly related to a diffuse colitis versus this severely edematous state of the patient. Nonspecific dilated loops of small bowel without definite evidence of obstruction.   Partial collapse of the right lower lobe, left lower lobe and right middle lobe.    - RUQ US : 1.  Enlarged fatty infiltrated liver (hepatomegaly with diffuse steatosis).  2.  Sludge within the gallbladder lumen which has a thickened edematous wall. No echogenic stones. Negative sonographic Carter's sign. Thickened edematous gallbladder wall is a nonspecific finding possibly related to volume status in this patient. 3.  Ascites is noted.    Patient admitted to SDU for severe hyponatremia, large abdominal ascites, and cholestatic liver injury.    29 yo male previously healthy presented today for worsening jaundice and abdominal distended that started 2 weeks ago. Since his jaundice started, the patient began drinking a lot of fluids minimum 4L / day, his abdominal distention kept getting worse and his urine became darker, with oliguria. He reports SOB attributed to his abdomen pushing up on his diaphragm. Last BM yesterday, patient passing gas.  Patient denies any pain, fever, or any other signs of symptoms.   Social hx: former smoker stopped 2 years ago, has 3-4 PY.   alcohol: drinks every other day 4-5 beers, however last drink was 3 weeks ago.   No drugs., no allergies.     In the ED vitals sinus tachycardia   labs remarkable for WBC 16K, hb 10.7 (no BL), INR 5.69 , Na 119, AG 15, total bili 31.1 , aLP 175,   UA +VE  -ve alcohol level, acetaminophen and salicylate levels.    - CT abd pelv: Diffuse large amount of abdominal and pelvic ascites. Hepatosplenomegaly with right paracolic varices, findings suspicious for portal hypertension. Diffuse colonic wall thickening, possibly related to a diffuse colitis versus this severely edematous state of the patient. Nonspecific dilated loops of small bowel without definite evidence of obstruction.   Partial collapse of the right lower lobe, left lower lobe and right middle lobe.    - RUQ US : 1.  Enlarged fatty infiltrated liver (hepatomegaly with diffuse steatosis).  2.  Sludge within the gallbladder lumen which has a thickened edematous wall. No echogenic stones. Negative sonographic Carter's sign. Thickened edematous gallbladder wall is a nonspecific finding possibly related to volume status in this patient. 3.  Ascites is noted.    Patient admitted to SDU for severe hyponatremia, large abdominal ascites, and cholestatic liver injury.    27 yo male previously healthy presented today for worsening jaundice and abdominal distended that started 2 weeks ago. Since his jaundice started, the patient began drinking a lot of fluids minimum 4L / day, his abdominal distention kept getting worse and his urine became darker, with oliguria. He reports SOB attributed to his abdomen pushing up on his diaphragm. Last BM yesterday, patient passing gas.  Patient denies any pain, fever, or any other signs of symptoms. No hx of mood disorder.   Social hx: former smoker stopped 2 years ago, has 3-4 PY.   alcohol: drinks every other day 4-5 beers, however last drink was 3 weeks ago.   No drugs., no allergies.     In the ED vitals sinus tachycardia   labs remarkable for WBC 16K, hb 10.7 (no BL), INR 5.69 , Na 119, AG 15, total bili 31.1 , aLP 175,   UA +VE  -ve alcohol level, acetaminophen and salicylate levels.    - CT abd pelv: Diffuse large amount of abdominal and pelvic ascites. Hepatosplenomegaly with right paracolic varices, findings suspicious for portal hypertension. Diffuse colonic wall thickening, possibly related to a diffuse colitis versus this severely edematous state of the patient. Nonspecific dilated loops of small bowel without definite evidence of obstruction.   Partial collapse of the right lower lobe, left lower lobe and right middle lobe.    - RUQ US : 1.  Enlarged fatty infiltrated liver (hepatomegaly with diffuse steatosis).  2.  Sludge within the gallbladder lumen which has a thickened edematous wall. No echogenic stones. Negative sonographic Carter's sign. Thickened edematous gallbladder wall is a nonspecific finding possibly related to volume status in this patient. 3.  Ascites is noted.    Patient admitted to SDU for severe hyponatremia, large abdominal ascites, and cholestatic liver injury.    29 yo male previously healthy presented today for worsening jaundice and abdominal distended that started 2 weeks ago. Since his jaundice started, the patient began drinking a lot of fluids minimum 4L / day, his abdominal distention kept getting worse and his urine became darker, with oliguria. He reports SOB attributed to his abdomen pushing up on his diaphragm. Last BM yesterday, patient passing gas.  Patient denies any pain, fever, or any other signs of symptoms. No hx of mood disorder.   Social hx: former smoker stopped 2 years ago, has 3-4 PY.   alcohol: drinks every other day 4-5 beers, however last drink was 3 weeks ago.   No drugs., no allergies.     In the ED vitals sinus tachycardia   labs remarkable for WBC 16K, hb 10.7 (no BL), INR 5.69 , Na 119, AG 15, total bili 31.1 , aLP 175,   UA +VE  -ve alcohol level, acetaminophen and salicylate levels.    - CT abd pelv: Diffuse large amount of abdominal and pelvic ascites. Hepatosplenomegaly with right paracolic varices, findings suspicious for portal hypertension. Diffuse colonic wall thickening, possibly related to a diffuse colitis versus this severely edematous state of the patient. Nonspecific dilated loops of small bowel without definite evidence of obstruction.   Partial collapse of the right lower lobe, left lower lobe and right middle lobe.    - RUQ US : 1.  Enlarged fatty infiltrated liver (hepatomegaly with diffuse steatosis).  2.  Sludge within the gallbladder lumen which has a thickened edematous wall. No echogenic stones. Negative sonographic Carter's sign. Thickened edematous gallbladder wall is a nonspecific finding possibly related to volume status in this patient. 3.  Ascites is noted.    Patient admitted to SDU for severe hyponatremia, large abdominal ascites, and cholestatic liver injury.

## 2023-11-16 NOTE — CONSULT NOTE ADULT - SUBJECTIVE AND OBJECTIVE BOX
Patient is a 28y old  Male who presents with a chief complaint of cholestasis, abdominal ascites, hyponatremia (16 Nov 2023 02:40)      HPI: 29 yo male previously healthy presented today for worsening jaundice and abdominal distention that started 2 weeks ago. Since his jaundice started, the patient began drinking a lot of fluids minimum 4L / day, his abdominal distention kept getting worse and his urine became darker, with oliguria. He reports SOB attributed to his abdomen pushing up on his diaphragm. Last BM yesterday, patient passing gas.  Patient denies any pain, fever, or any other signs of symptoms. No hx of mood disorder.     In the ED vitals sinus tachycardia   labs remarkable for WBC 16K, hb 10.7 (no BL), INR 5.69 , Na 119, AG 15, total bili 31.1 , aLP 175,     Patient admitted to SDU for severe hyponatremia, large abdominal ascites, and cholestatic liver injury.    (16 Nov 2023 02:40)      PAST MEDICAL & SURGICAL HISTORY:  No significant past surgical history          Social hx: former smoker stopped 2 years ago, has 3-4 PY.   alcohol: drinks every other day 4-5 beers, however last drink was 3 weeks ago.   No drugs., no allergies.       FAMILY HISTORY:  :  No known cardiovascular family history     Review Of Systems:     All ROS are negative except per HPI       Allergies    No Known Allergies    Intolerances        Vital Signs Last 24 Hrs  T(C): 36.8 (16 Nov 2023 07:55), Max: 37.3 (16 Nov 2023 00:06)  T(F): 98.3 (16 Nov 2023 07:55), Max: 99.2 (16 Nov 2023 00:06)  HR: 102 (16 Nov 2023 07:55) (101 - 116)  BP: 118/64 (16 Nov 2023 07:55) (105/61 - 127/75)  BP(mean): 84 (16 Nov 2023 07:55) (76 - 84)  RR: 17 (16 Nov 2023 07:55) (17 - 21)  SpO2: 99% (16 Nov 2023 07:55) (97% - 100%)    O2 Parameters below as of 16 Nov 2023 07:55  Patient On (Oxygen Delivery Method): room air        CONSTITUTIONAL:  Well nourished.   NAD    ENT:   Airway patent,   Mouth with normal mucosa.   No thrush      CARDIAC:   Normal rate,   Regular rhythm.    No edema      RESPIRATORY:   No wheezing  Bilateral BS   Not tachypneic,  No use of accessory muscles    GASTROINTESTINAL:  Abdomen soft,   Non-tender,   No guarding,   + BS      NEUROLOGICAL:   Alert and oriented   No motor deficits.    SKIN:   Skin normal color for race,   No evidence of rash.                LABS:                          10.0   15.38 )-----------( 227      ( 16 Nov 2023 06:59 )             27.5                                               11-16    122<L>  |  86<L>  |  19  ----------------------------<  112<H>  4.0   |  23  |  1.2    Ca    8.0<L>      16 Nov 2023 06:59  Phos  3.5     11-16  Mg     2.6     11-16    TPro  5.3<L>  /  Alb  2.5<L>  /  TBili  29.6<HH>  /  DBili  >17.2  /  AST  149<H>  /  ALT  15  /  AlkPhos  162<H>  11-16      PT/INR - ( 16 Nov 2023 06:59 )   PT: 28.60 sec;   INR: 2.49 ratio         PTT - ( 16 Nov 2023 06:59 )  PTT:40.1 sec                                       Urinalysis Basic - ( 16 Nov 2023 07:54 )    Color: Dark Yellow / Appearance: Cloudy / SG: >1.030 / pH: x  Gluc: x / Ketone: Negative mg/dL  / Bili: Large / Urobili: 1.0 mg/dL   Blood: x / Protein: 30 mg/dL / Nitrite: Positive   Leuk Esterase: Small / RBC: 4 /HPF / WBC 2 /HPF   Sq Epi: x / Non Sq Epi: 0 /HPF / Bacteria: Negative /HPF                                                  LIVER FUNCTIONS - ( 16 Nov 2023 06:59 )  Alb: 2.5 g/dL / Pro: 5.3 g/dL / ALK PHOS: 162 U/L / ALT: 15 U/L / AST: 149 U/L / GGT: x                                                                                               MEDICATIONS  (STANDING):  cefTRIAXone   IVPB 1000 milliGRAM(s) IV Intermittent every 24 hours  lactulose Syrup 10 Gram(s) Oral daily  metroNIDAZOLE  IVPB      metroNIDAZOLE  IVPB 500 milliGRAM(s) IV Intermittent every 8 hours  pantoprazole    Tablet 40 milliGRAM(s) Oral before breakfast    MEDICATIONS  (PRN):  LORazepam   Injectable 2 milliGRAM(s) IntraMuscular every 2 hours PRN Symptom-triggered: 2 point increase in CIWA -Ar score and a total score of 7 or LESS  traMADol 25 milliGRAM(s) Oral every 8 hours PRN Severe Pain (7 - 10)        CXR reviewed

## 2023-11-16 NOTE — CONSULT NOTE ADULT - ASSESSMENT
IMPRESSION:    Severe Hyponatremia in setting of Liver Disease  Portal Hypertension/Hepatomegaly/Right Paracolic Varices/Alcoholic Steatosis   Hepatorenal Syndrome   Possible Diffuse Colitis  Transaminitis  Abdominal/Pelvic Ascites  Bilateral lower lobe partial atelectasis    PLAN:    CNS: mental status at baseline, thiamine, folate, CIWA monitoring     HEENT: Oral care    PULMONARY:  HOB @ 45 degrees.  Aspiration precautions. on room air, incentive spirometry    CARDIOVASCULAR: target MAP, avoid beta-blockers, consider albumin, midodrine, octreotide     GI: GI prophylaxis.  SP attempted failed paracentesis in ED, IR evaluation, Hepatology evaluation    RENAL:  Follow up lytes.  Correct as needed, check urine lytes, urine osmolality    INFECTIOUS DISEASE: Follow up cultures, continue Ceftriaxone and Flagyl, check procalcitonin, hepatitis panel    HEMATOLOGICAL:  DVT prophylaxis. maintain active type and screen, transfuse if Hgb<7.0    ENDOCRINE:  Follow up FS.  Insulin protocol if needed.    MUSCULOSKELETAL: activity as tolerated       SDU monitoring      IMPRESSION:    Severe Hyponatremia in setting of Liver Disease  Portal Hypertension/Hepatomegaly/Right Paracolic Varices/Alcoholic Steatosis   Hepatorenal Syndrome   Possible Diffuse Colitis  Transaminitis  Abdominal/Pelvic Ascites  Bilateral lower lobe partial atelectasis    PLAN:    CNS: mental status at baseline, thiamine, folate, CIWA monitoring, check ammonia level    HEENT: Oral care    PULMONARY:  HOB @ 45 degrees.  Aspiration precautions. on room air, incentive spirometry    CARDIOVASCULAR: target MAP, avoid beta-blockers, consider albumin, midodrine, octreotide     GI: GI prophylaxis.  SP attempted failed paracentesis in ED, IR evaluation, Hepatology evaluation    RENAL:  Follow up lytes.  Correct as needed, check urine lytes, urine osmolality    INFECTIOUS DISEASE: Follow up cultures, continue Ceftriaxone and Flagyl, check procalcitonin, hepatitis panel    HEMATOLOGICAL:  DVT prophylaxis. maintain active type and screen, transfuse if Hgb<7.0    ENDOCRINE:  Follow up FS.  Insulin protocol if needed.    MUSCULOSKELETAL: activity as tolerated       SDU monitoring      IMPRESSION:    Hyponatremia in setting of Liver Disease  Portal Hypertension/Hepatomegaly/Right Paracolic Varices/Alcoholic Steatosis   Hepatorenal Syndrome   Possible Diffuse Colitis  Transaminitis  Abdominal/Pelvic Ascites  Bilateral lower lobe partial atelectasis    PLAN:    CNS: mental status at baseline, thiamine, folate, CIWA monitoring, check ammonia level    HEENT: Oral care    PULMONARY:  HOB @ 45 degrees.  Aspiration precautions. on room air, incentive spirometry    CARDIOVASCULAR: target MAP, avoid beta-blockers, consider albumin, midodrine, octreotide     GI: GI prophylaxis.  SP attempted failed paracentesis in ED, IR evaluation, Hepatology evaluation    RENAL:  Follow up lytes.  Correct as needed, check urine lytes, urine osmolality    INFECTIOUS DISEASE: Follow up cultures, continue Ceftriaxone and Flagyl, check procalcitonin, hepatitis panel    HEMATOLOGICAL:  DVT prophylaxis. maintain active type and screen, transfuse if Hgb<7.0    ENDOCRINE:  Follow up FS.  Insulin protocol if needed.    MUSCULOSKELETAL: activity as tolerated     SDU monitoring

## 2023-11-16 NOTE — CONSULT NOTE ADULT - ASSESSMENT
Note Incomplete    NOTE INCOMPLETE    Patient is a 28y Male with no pmhx presented to ED 11/15 for worsening jaundice and abdominal distension x 3 weeks with .  dark x 4 days, oliguria , reports drinking drinking  fluids minimum 4L / day to try and urinate  Alcohol: Drinks every other day 4-5 beers, however last drink was 3 weeks ago per.   No drugs., no allergies.     In the ED vitals sinus tachycardia   labs remarkable for WBC 16K, hb 10.7 (no BL), INR 5.69 , Na 119, AG 15, total bili 31.1 , aLP 175,   UA +VE  -ve alcohol level, acetaminophen and salicylate levels.  S/P paracentesis 3.6 mL today      Assessment/ Recommendations:  Severe Hyponatremia on admission Na+ 119 at 6 pm 11/15  Today Na+ 122, gradually improving   Please do Urine studies, urine osmolarity, urine Na  Restrict fluids for now   Follow N+ levels closely  Note Incomplete    NOTE INCOMPLETE    Patient is a 28y Male with no pmhx, he presented to ED on 11/15 for worsening jaundice and abdominal distension x 3 weeks with  dark urine and oliguria x 4 days , reports drinking drinking fluids minimum 4L / day to try and urinate.  Alcohol: Drinks every other day 4-5 beers, however last drink was 3 weeks ago per.   No drugs., no allergies.     In the ED:   labs remarkable for WBC 16K, hb 10.7 (no BL), INR 5.69 , Na 119, AG 15, total bili 31.1 , aLP 175,   S/P paracentesis 3.6 mL today      Assessment/ Recommendations:  Severe Hyponatremia on admission Na+ 119 at 6 pm 11/15  Today Na+ 122, gradually improving   Please do Urine studies; urine osmolality, urine electrolytes  Restrict fluids for now   Follow N+ levels closely    NOTE INCOMPLETE    Patient is a 28y Male with no pmhx, he presented to ED on 11/15 for worsening jaundice and abdominal distension x 3 weeks with  dark urine and oliguria x 4 days , reports drinking drinking fluids minimum 4L / day to try and urinate.  Alcohol: Drinks every other day 4-5 beers, however last drink was 3 weeks ago per.   No drugs., no allergies.     In the ED:   labs remarkable for WBC 16K, hb 10.7 (no BL), INR 5.69 , Na 119, AG 15, total bili 31.1 , aLP 175,   S/P paracentesis 3.6 mL today      Assessment/ Recommendations:  Severe Hyponatremia on admission Na+ 119 at 6 pm 11/15  Today Na+ 122, gradually improving   Please do Urine studies; urine osmolality, urine electrolytes  Restrict fluids for now   Follow N+ levels closely    NOTE INCOMPLETE    Patient is a 28y Male with no pmhx, he presented to ED on 11/15 for worsening jaundice and abdominal distension x 3 weeks with  dark urine and oliguria x 4 days , reports drinking drinking fluids minimum 4L / day to try and urinate.  Alcohol: Drinks every other day 4-5 beers, however last drink was 3 weeks ago per.   No drugs., no allergies.     In the ED: labs remarkable for WBC 16K, hb 10.7 (no BL), INR 5.69 , Na 119, AG 15, total bili 31.1 , aLP 175,   S/P paracentesis 3.6 mL today      Assessment/ Recommendations:  Severe Hyponatremia on admission Na+ 119 at 6 pm 11/15  Today Na+ 122, gradually improving. Cr 1.0   Please do Urine studies; urine osmolality, urine electrolytes  Restrict fluids for now   Follow N+ levels closely  Patient is a 28y Male with no pmhx, he presented to ED on 11/15 for worsening jaundice and abdominal distension x 3 weeks with  dark urine and oliguria x 4 days , reports drinking drinking fluids minimum 4L / day to try and urinate.  Alcohol: Drinks every other day 4-5 beers, however last drink was 3 weeks ago per.   No drugs., no allergies.     In the ED: labs remarkable for WBC 16K, hb 10.7 (no BL), INR 5.69 , Na 119, AG 15, total bili 31.1 , aLP 175,   S/P paracentesis 3.6 mL today      Assessment/ Recommendations:  Severe Hyponatremia on admission Na+ 119 at 6 pm 11/15  Today Na+ 122, gradually improving. Cr 1.0   Please do Urine studies; urine osmolality, urine electrolytes  Restrict fluids for now   Follow N+ levels closely  Patient is a 28y Male with no pmhx, he presented to ED on 11/15 for worsening jaundice and abdominal distension x 3 weeks with  dark urine and oliguria x 4 days , reports drinking drinking fluids minimum 4L / day to try and urinate.  Alcohol: Drinks every other day 4-5 beers, however last drink was 3 weeks ago.   No drugs., no allergies.     In the ED: labs remarkable for WBC 16K, hb 10.7 (no BL), INR 5.69 , Na 119, AG 15, total bili 31.1 , aLP 175,   S/P paracentesis 3.6 mL today      Assessment/ Recommendations:  Severe Hyponatremia on admission Na+ 119 at 6 pm 11/15  Today Na+ 122, gradually improving. Cr 1.0   Please do Urine studies; urine osmolality, urine electrolytes  Restrict fluids for now   Follow N+ levels closely  Patient is a 28y Male with no pmhx, he presented to ED on 11/15 for worsening jaundice and abdominal distension x 3 weeks with  dark urine and oliguria x 4 days , reports drinking drinking fluids minimum 4L / day to try and urinate.  Alcohol: Drinks every other day 4-5 beers, however last drink was 3 weeks ago.   No drugs., no allergies.     In the ED: labs remarkable for WBC 16K, hb 10.7 (no BL), INR 5.69 , Na 119, AG 15, total bili 31.1 , aLP 175,   S/P paracentesis 3.6 mL today      Assessment/ Recommendations:  Severe Hyponatremia on admission Na+ 119 at 6 pm 11/15  Today Na+ 122, gradually improving. YANDY Cr 1.0 (baseline 0.7)   Please do Urine studies; urine osmolality, urine electrolytes  Restrict fluids for now   Follow N+ levels closely  Patient is a 28y Male with no pmhx, he presented to ED on 11/15 for worsening jaundice and abdominal distension x 3 weeks with  dark urine and oliguria x 4 days , reports drinking drinking fluids minimum 4L / day to try and urinate.  Alcohol: Drinks every other day 4-5 beers, however last drink was 3 weeks ago.   No drugs., no allergies.     In the ED: labs remarkable for WBC 16K, hb 10.7 (no BL), INR 5.69 , Na 119, AG 15, total bili 31.1 , aLP 175,   S/P paracentesis 3.6 mL today      Assessment/ Recommendations:  Severe Hyponatremia on admission Na+ 119 at 6 pm 11/15  Today Na+ 122, gradually improving. YANDY Cr 1.2->1.0 (baseline 0.7)   Please do Urine studies; urine osmolality, urine electrolytes  Restrict fluids for now   Follow N+ levels closely

## 2023-11-16 NOTE — CONSULT NOTE ADULT - ASSESSMENT
29 yo male previously healthy presented today for worsening jaundice and abdominal distention   that started 2 weeks ago. patient was admitted for etoh hepatitis.    # Etoh hepatitis MDF > 32  # ascites   # R/O etoh cirrhosis. varices in CT scan   # YANDY   # Coagulopathy   No encephalopathy   MELD3: 32  MELD-Na: 34    - CT abd pelv: Diffuse large amount of abdominal and pelvic ascites. Hepatosplenomegaly with right paracolic varices, findings suspicious for portal hypertension.   - RUQ US : 1.  Enlarged fatty infiltrated liver (hepatomegaly with diffuse steatosis). Ascites is noted.  LIVER FUNCTIONS - ( 16 Nov 2023 11:26 )  Alb: 2.4 g/dL / Pro: 5.1 g/dL / ALK PHOS: 165 U/L / ALT: 14 U/L / AST: 141 U/L / GGT: x           PLAN   Albumin 1 g/kg QD for two days   Vitamin K 10 mg QD for three days   INR and LFT Q12   Please send Hepatitis A IgM, Hepatitis B core IgM, core Ab total, surface Ag, surface Ab, HCV antibody, HCV RNA, Anti HEV  Please send Iron studies and ceruloplasmin  Please obtain Quantiferon level  Please send GIOVANY, AMA, anti-Smooth Muscle Ab type 1, Anti liver-kidney microsomal Ab, Anti-soluble liver Ag, immunoglobulin panel  Please send CMV PCR, EBV PCR, HSV IgM  Please avoid hepatotoxic medications  Lives with mother and brother. Works at [employer].   LT evaluation: Will consider transfer to transplant center based on the progression over the next 24-48 hours   Please notify hepatology with any change on mental status

## 2023-11-16 NOTE — PROCEDURE NOTE - NSPROCDETAILS_GEN_ALL_CORE
location identified, sterile technique used, catheter introduced, fluid drained
location identified, sterile technique used, catheter introduced, fluid drained/Seldinger technique/sterile dressing applied

## 2023-11-16 NOTE — CONSULT NOTE ADULT - SUBJECTIVE AND OBJECTIVE BOX
NEPHROLOGY CONSULTATION NOTE    Patient is a 28y Male with no pmhx presented to ED 11/15 for worsening jaundice and abdominal distended that started 3 weeks ago. Patient's urine became dark x 4 days with oliguria so he began drinking a lot of fluids minimum 4L / day, his abdominal distention kept getting worse. He reports SOB attributed to his abdomen pushing up on his diaphragm. Last BM 1 day prior.  Patient denies any pain, fever, or any other signs of symptoms. No hx of mood disorder.   Social hx: former smoker stopped 2 years ago, has 3-4 PY.   Alcohol: Drinks every other day 4-5 beers, however last drink was 3 weeks ago per.   No drugs., no allergies.     In the ED vitals sinus tachycardia   labs remarkable for WBC 16K, hb 10.7 (no BL), INR 5.69 , Na 119, AG 15, total bili 31.1 , aLP 175,   UA +VE  -ve alcohol level, acetaminophen and salicylate levels.    Patient admitted to SDU for severe hyponatremia, large abdominal ascites, and cholestatic liver injury.         PAST MEDICAL & SURGICAL HISTORY:  No significant past surgical history    Allergies:  No Known Allergies    Home Medications: None  Hospital Medications:   MEDICATIONS  (STANDING):  cefTRIAXone   IVPB 1000 milliGRAM(s) IV Intermittent every 24 hours  lactulose Syrup 10 Gram(s) Oral daily  metroNIDAZOLE  IVPB      metroNIDAZOLE  IVPB 500 milliGRAM(s) IV Intermittent every 8 hours  pantoprazole    Tablet 40 milliGRAM(s) Oral before breakfast  phytonadione  IVPB 10 milliGRAM(s) IV Intermittent daily      SOCIAL HISTORY:  Denies ETOH,Smoking,   FAMILY HISTORY: Liver cancer 2/2 Hep C        REVIEW OF SYSTEMS:  CONSTITUTIONAL: No fevers or chills  EYES/ENT: No visual changes;  No vertigo  NECK: No pain or stiffness  RESPIRATORY: No cough, wheezing, hemoptysis;    CARDIOVASCULAR: No chest pain or palpitations.  GASTROINTESTINAL: No abdominal or epigastric pain. No nausea, vomiting,  GENITOURINARY: oligouric x4 days, dark urine  NEUROLOGICAL: No numbness or weakness  SKIN: +Jaundice, no rashes  VASCULAR: No bilateral lower extremity edema.   All other review of systems is negative unless indicated above.    VITALS:  T(F): 98.3 (11-16-23 @ 07:55), Max: 99.2 (11-16-23 @ 00:06)  HR: 101 (11-16-23 @ 12:00)  BP: 103/58 (11-16-23 @ 12:00)  RR: 16 (11-16-23 @ 12:00)  SpO2: 99% (11-16-23 @ 12:00)  Weight (kg): 77.1 (11-15 @ 18:17)      PHYSICAL EXAM:  Constitutional: Moderate distress  HEENT: + icteric sclera, oropharynx clear  Neck: No JVD  Respiratory: CTAB, no wheezes, rales or rhonchi  Cardiovascular: S1, S2, RRR  Gastrointestinal: + Ascitics BS+, soft, No guarding  Extremities: No peripheral edema  : No CVA tenderness. +Oliguric  Skin: + Severe jaundice      LABS:  11-16    122<L>  |  87<L>  |  19  ----------------------------<  107<H>  3.8   |  22  |  1.0    Ca    7.9<L>      16 Nov 2023 11:26  Phos  3.1     11-16  Mg     2.5     11-16    TPro  5.1<L>  /  Alb  2.4<L>  /  TBili  29.2<HH>  /  DBili  >17.2<H>  /  AST  141<H>  /  ALT  14  /  AlkPhos  165<H>  11-16    Creatinine Trend: 1.0 <--, 1.2 <--, 0.7 <--                        10.0   15.38 )-----------( 227      ( 16 Nov 2023 06:59 )             27.5     Urine Studies:  Urinalysis Basic - ( 16 Nov 2023 11:26 )    Color:  / Appearance:  / SG:  / pH:   Gluc: 107 mg/dL / Ketone:   / Bili:  / Urobili:    Blood:  / Protein:  / Nitrite:    Leuk Esterase:  / RBC:  / WBC    Sq Epi:  / Non Sq Epi:  / Bacteria:       RADIOLOGY & ADDITIONAL STUDIES:  - CT abd pelv:   Diffuse large amount of abdominal and pelvic ascites. Hepatosplenomegaly with right paracolic varices, findings suspicious for portal hypertension. Diffuse colonic wall thickening, possibly related to a diffuse colitis versus this severely edematous state of the patient. Nonspecific dilated loops of small bowel without definite evidence of obstruction.   Partial collapse of the right lower lobe, left lower lobe and right middle lobe.    - RUQ US :   1.  Enlarged fatty infiltrated liver (hepatomegaly with diffuse steatosis).  2.  Sludge within the gallbladder lumen which has a thickened edematous wall. No echogenic stones. Negative sonographic Carter's sign. Thickened edematous gallbladder wall is a nonspecific finding possibly related to volume status in this patient. 3.  Ascites is noted.     NEPHROLOGY CONSULTATION NOTE    Patient is a 28y Male with no pmhx, he presented to ED 11/15 for worsening jaundice and abdominal distended that started 3 weeks ago. Patient's urine became dark x 4 days with oliguria,  so he began drinking a lot of fluids minimum 4L / day.  He reports SOB attributed to his abdomen pushing up on his diaphragm. Last BM 1 day prior.  Patient denies any pain, fever, or any other signs of symptoms. No hx of mood disorder.   Social hx: former smoker stopped 2 years ago, has 3-4 PY.   Alcohol: Drinks every other day 4-5 beers, however last drink was 3 weeks ago per.   No drugs., no allergies.     In the ED vitals sinus tachycardia   labs remarkable for WBC 16K, hb 10.7 (no BL), INR 5.69 , Na 119, AG 15, total bili 31.1 , aLP 175,   UA +VE  -ve alcohol level, acetaminophen and salicylate levels.    Patient admitted for severe hyponatremia, large abdominal ascites, and cholestatic liver injury.         PAST MEDICAL & SURGICAL HISTORY:  No significant past surgical history    Allergies:  No Known Allergies    Home Medications: None  Hospital Medications:   MEDICATIONS  (STANDING):  cefTRIAXone   IVPB 1000 milliGRAM(s) IV Intermittent every 24 hours  lactulose Syrup 10 Gram(s) Oral daily  metroNIDAZOLE  IVPB      metroNIDAZOLE  IVPB 500 milliGRAM(s) IV Intermittent every 8 hours  pantoprazole    Tablet 40 milliGRAM(s) Oral before breakfast  phytonadione  IVPB 10 milliGRAM(s) IV Intermittent daily      SOCIAL HISTORY:  Denies ETOH,Smoking,   FAMILY HISTORY: Liver cancer 2/2 Hep C        REVIEW OF SYSTEMS:  CONSTITUTIONAL: No fevers or chills  EYES/ENT: No visual changes;  No vertigo  NECK: No pain or stiffness  RESPIRATORY: No cough, wheezing, hemoptysis;    CARDIOVASCULAR: No chest pain or palpitations.  GASTROINTESTINAL: No abdominal or epigastric pain. No nausea, vomiting,  GENITOURINARY: oligouric x4 days, dark urine  NEUROLOGICAL: No numbness or weakness  SKIN: +Jaundice, no rashes  VASCULAR: No bilateral lower extremity edema.   All other review of systems is negative unless indicated above.    VITALS:  T(F): 98.3 (11-16-23 @ 07:55), Max: 99.2 (11-16-23 @ 00:06)  HR: 101 (11-16-23 @ 12:00)  BP: 103/58 (11-16-23 @ 12:00)  RR: 16 (11-16-23 @ 12:00)  SpO2: 99% (11-16-23 @ 12:00)  Weight (kg): 77.1 (11-15 @ 18:17)      PHYSICAL EXAM:  Constitutional: Moderate distress  HEENT: + icteric sclera, oropharynx clear  Neck: No JVD  Respiratory: CTAB, no wheezes, rales or rhonchi  Cardiovascular: S1, S2, RRR  Gastrointestinal: + Ascitics BS+, soft, No guarding  Extremities: No peripheral edema  : No CVA tenderness. +Oliguric  Skin: + Severe jaundice      LABS:  11-16    122<L>  |  87<L>  |  19  ----------------------------<  107<H>  3.8   |  22  |  1.0    Ca    7.9<L>      16 Nov 2023 11:26  Phos  3.1     11-16  Mg     2.5     11-16    TPro  5.1<L>  /  Alb  2.4<L>  /  TBili  29.2<HH>  /  DBili  >17.2<H>  /  AST  141<H>  /  ALT  14  /  AlkPhos  165<H>  11-16    Creatinine Trend: 1.0 <--, 1.2 <--, 0.7 <--                        10.0   15.38 )-----------( 227      ( 16 Nov 2023 06:59 )             27.5     Urine Studies:  Urinalysis Basic - ( 16 Nov 2023 11:26 )    Color:  / Appearance:  / SG:  / pH:   Gluc: 107 mg/dL / Ketone:   / Bili:  / Urobili:    Blood:  / Protein:  / Nitrite:    Leuk Esterase:  / RBC:  / WBC    Sq Epi:  / Non Sq Epi:  / Bacteria:       RADIOLOGY & ADDITIONAL STUDIES:  - CT abd pelv:   Diffuse large amount of abdominal and pelvic ascites. Hepatosplenomegaly with right paracolic varices, findings suspicious for portal hypertension. Diffuse colonic wall thickening, possibly related to a diffuse colitis versus this severely edematous state of the patient. Nonspecific dilated loops of small bowel without definite evidence of obstruction.   Partial collapse of the right lower lobe, left lower lobe and right middle lobe.    - RUQ US :   1.  Enlarged fatty infiltrated liver (hepatomegaly with diffuse steatosis).  2.  Sludge within the gallbladder lumen which has a thickened edematous wall. No echogenic stones. Negative sonographic Carter's sign. Thickened edematous gallbladder wall is a nonspecific finding possibly related to volume status in this patient. 3.  Ascites is noted.     NEPHROLOGY CONSULTATION NOTE    Patient is a 28y Male with no pmhx, he presented to ED 11/15 for worsening jaundice and abdominal distended that started 3 weeks ago. Patient's urine became dark x 4 days with oliguria,  so he began drinking a lot of fluids minimum 4L / day.  He reports SOB attributed to his abdomen pushing up on his diaphragm. Last BM 1 day prior.  Patient denies any pain, fever, or any other signs of symptoms. No hx of mood disorder.   Social hx: former smoker stopped 2 years ago, has 3-4 PY.   Alcohol: Drinks every other day 4-5 beers, however last drink was 3 weeks ago.   No drugs., no allergies.     In the ED vitals sinus tachycardia   labs remarkable for WBC 16K, hb 10.7 (no BL), INR 5.69 , Na 119, AG 15, total bili 31.1 , aLP 175,   UA +VE  -ve alcohol level, acetaminophen and salicylate levels.    Patient admitted for severe hyponatremia, large abdominal ascites, and cholestatic liver injury.         PAST MEDICAL & SURGICAL HISTORY:  No significant past surgical history    Allergies:  No Known Allergies    Home Medications: None  Hospital Medications:   MEDICATIONS  (STANDING):  cefTRIAXone   IVPB 1000 milliGRAM(s) IV Intermittent every 24 hours  lactulose Syrup 10 Gram(s) Oral daily  metroNIDAZOLE  IVPB      metroNIDAZOLE  IVPB 500 milliGRAM(s) IV Intermittent every 8 hours  pantoprazole    Tablet 40 milliGRAM(s) Oral before breakfast  phytonadione  IVPB 10 milliGRAM(s) IV Intermittent daily      SOCIAL HISTORY:  Denies ETOH,Smoking,   FAMILY HISTORY: Liver cancer 2/2 Hep C        REVIEW OF SYSTEMS:  CONSTITUTIONAL: No fevers or chills  EYES/ENT: No visual changes;  No vertigo  NECK: No pain or stiffness  RESPIRATORY: No cough, wheezing, hemoptysis;    CARDIOVASCULAR: No chest pain or palpitations.  GASTROINTESTINAL: No abdominal or epigastric pain. No nausea, vomiting,  GENITOURINARY: oligouric x4 days, dark urine  NEUROLOGICAL: No numbness or weakness  SKIN: +Jaundice, no rashes  VASCULAR: No bilateral lower extremity edema.   All other review of systems is negative unless indicated above.    VITALS:  T(F): 98.3 (11-16-23 @ 07:55), Max: 99.2 (11-16-23 @ 00:06)  HR: 101 (11-16-23 @ 12:00)  BP: 103/58 (11-16-23 @ 12:00)  RR: 16 (11-16-23 @ 12:00)  SpO2: 99% (11-16-23 @ 12:00)  Weight (kg): 77.1 (11-15 @ 18:17)      PHYSICAL EXAM:  Constitutional: Moderate distress  HEENT: + icteric sclera, oropharynx clear  Neck: No JVD  Respiratory: CTAB, no wheezes, rales or rhonchi  Cardiovascular: S1, S2, RRR  Gastrointestinal: + Ascitics BS+, soft, No guarding  Extremities: No peripheral edema  : No CVA tenderness. +Oliguric  Skin: + Severe jaundice      LABS:  11-16    122<L>  |  87<L>  |  19  ----------------------------<  107<H>  3.8   |  22  |  1.0    Ca    7.9<L>      16 Nov 2023 11:26  Phos  3.1     11-16  Mg     2.5     11-16    TPro  5.1<L>  /  Alb  2.4<L>  /  TBili  29.2<HH>  /  DBili  >17.2<H>  /  AST  141<H>  /  ALT  14  /  AlkPhos  165<H>  11-16    Creatinine Trend: 1.0 <--, 1.2 <--, 0.7 <--                        10.0   15.38 )-----------( 227      ( 16 Nov 2023 06:59 )             27.5     Urine Studies:  Urinalysis Basic - ( 16 Nov 2023 11:26 )    Color:  / Appearance:  / SG:  / pH:   Gluc: 107 mg/dL / Ketone:   / Bili:  / Urobili:    Blood:  / Protein:  / Nitrite:    Leuk Esterase:  / RBC:  / WBC    Sq Epi:  / Non Sq Epi:  / Bacteria:       RADIOLOGY & ADDITIONAL STUDIES:  - CT abd pelv:   Diffuse large amount of abdominal and pelvic ascites. Hepatosplenomegaly with right paracolic varices, findings suspicious for portal hypertension. Diffuse colonic wall thickening, possibly related to a diffuse colitis versus this severely edematous state of the patient. Nonspecific dilated loops of small bowel without definite evidence of obstruction.   Partial collapse of the right lower lobe, left lower lobe and right middle lobe.    - RUQ US :   1.  Enlarged fatty infiltrated liver (hepatomegaly with diffuse steatosis).  2.  Sludge within the gallbladder lumen which has a thickened edematous wall. No echogenic stones. Negative sonographic Carter's sign. Thickened edematous gallbladder wall is a nonspecific finding possibly related to volume status in this patient. 3.  Ascites is noted.

## 2023-11-16 NOTE — H&P ADULT - NSHPLABSRESULTS_GEN_ALL_CORE
10.7   16.49 )-----------( 241      ( 15 Nov 2023 20:43 )             29.2       11-15    119<LL>  |  82<L>  |  17  ----------------------------<  104<H>  4.3   |  22  |  0.7    Ca    8.1<L>      15 Nov 2023 20:43    TPro  5.5<L>  /  Alb  2.7<L>  /  TBili  31.1<HH>  /  DBili  >17.2  /  AST  158<H>  /  ALT  15  /  AlkPhos  175<H>  11-15              Urinalysis Basic - ( 15 Nov 2023 23:00 )    Color: Dark Yellow / Appearance: Cloudy / SG: >1.030 / pH: x  Gluc: x / Ketone: Negative mg/dL  / Bili: Large / Urobili: 1.0 mg/dL   Blood: x / Protein: 30 mg/dL / Nitrite: Positive   Leuk Esterase: Small / RBC: 6 /HPF / WBC 0 /HPF   Sq Epi: x / Non Sq Epi: 3 /HPF / Bacteria: Occasional /HPF        PT/INR - ( 15 Nov 2023 23:55 )   PT: 31.00 sec;   INR: 2.69 ratio         PTT - ( 15 Nov 2023 23:55 )  PTT:41.8 sec    Lactate Trend  11-16 @ 01:40 Lactate:1.1             CAPILLARY BLOOD GLUCOSE

## 2023-11-16 NOTE — H&P ADULT - ASSESSMENT
#MISC  - DVT PPx: low risk with elevated INR. SCD for now   - GI PPx: protonix 40mg po qd   - Diet: npo for now   - Activity: IAT  - Labs: ordered  - Code: Full code  - Dispo: SDU    - Medrec: confirmed with the patient  29 yo male previously healthy presented today for worsening jaundice and abdominal distended that started 2 weeks ago. Since his jaundice started, the patient began drinking a lot of fluids minimum 4L / day, his abdominal distention kept getting worse and his urine became darker, with oliguria. He reports SOB attributed to his abdomen pushing up on his diaphragm. Last BM yesterday, patient passing gas.  Patient denies any pain, fever, or any other signs of symptoms.   Social hx: former smoker stopped 2 years ago, has 3-4 PY.   alcohol: drinks every other day 4-5 beers, however last drink was 3 weeks ago.   No drugs., no allergies.   Patient admitted to SDU for severe hyponatremia, large abdominal ascites, and cholestatic liver injury.       labs remarkable for WBC 16K, hb 10.7 (no BL), INR 5.69 , Na 119, AG 15, total bili 31.1 , aLP 175,   UA +VE  -ve alcohol level, acetaminophen and salicylate levels.    - CT abd pelv: Diffuse large amount of abdominal and pelvic ascites. Hepatosplenomegaly with right paracolic varices, findings suspicious for portal hypertension. Diffuse colonic wall thickening, possibly related to a diffuse colitis versus this severely edematous state of the patient. Nonspecific dilated loops of small bowel without definite evidence of obstruction.   Partial collapse of the right lower lobe, left lower lobe and right middle lobe.    - RUQ US : 1.  Enlarged fatty infiltrated liver (hepatomegaly with diffuse steatosis).  2.  Sludge within the gallbladder lumen which has a thickened edematous wall. No echogenic stones. Negative sonographic Carter's sign. Thickened edematous gallbladder wall is a nonspecific finding possibly related to volume status in this patient. 3.  Ascites is noted.      # Acute cholestatic liver injury  # New onset large abdominal ascites  # Hepatic steatosis   - ED attempted paracentesis however no pocket   - tramadol prn for severe pain    #severe hyponatremia - likely hypervolemic 2/2 increased free water intake   - patient asymptomatic   - fluid restriction  - f/u urine studies  - f/u repeat Na    #MISC  - DVT PPx: low risk with elevated INR. SCD for now   - GI PPx: started protonix 40mg po qd   - Diet: npo for now until GI eval  - Activity: IAT  - Labs: ordered  - Code: Full code  - Dispo: SDU    - Medrec: confirmed with the patient  29 yo male previously healthy presented today for worsening jaundice and abdominal distended that started 2 weeks ago. Since his jaundice started, the patient began drinking a lot of fluids minimum 4L / day, his abdominal distention kept getting worse and his urine became darker, with oliguria. He reports SOB attributed to his abdomen pushing up on his diaphragm. Last BM yesterday, patient passing gas.  Patient denies any pain, fever, or any other signs of symptoms. No hx of mood disorder.   Social hx: former smoker stopped 2 years ago, has 3-4 PY.   alcohol: drinks every other day 4-5 beers, however last drink was 3 weeks ago.   No drugs., no allergies.   Patient admitted to SDU for severe hyponatremia, large abdominal ascites, and cholestatic liver injury.       # Acute cholestatic liver injury  # New onset large abdominal ascites  # Hepatic steatosis   # Possible diffuse colitis   -labs remarkable for WBC 16K, hb 10.7 (no BL), INR 5.69 , Na 119, AG 15, total bili 31.1 , aLP 175,   -ve alcohol level, acetaminophen and salicylate levels.  - CT abd pelv: Diffuse large amount of abdominal and pelvic ascites. Hepatosplenomegaly with right paracolic varices, findings suspicious for portal hypertension. Diffuse colonic wall thickening, possibly related to a diffuse colitis versus this severely edematous state of the patient. Nonspecific dilated loops of small bowel without definite evidence of obstruction.   Partial collapse of the right lower lobe, left lower lobe and right middle lobe.  - RUQ US : 1.  Enlarged fatty infiltrated liver (hepatomegaly with diffuse steatosis).  2.  Sludge within the gallbladder lumen which has a thickened edematous wall. No echogenic stones. Negative sonographic Carter's sign. Thickened edematous gallbladder wall is a nonspecific finding possibly related to volume status in this patient. 3.  Ascites is noted.  - ED attempted paracentesis however no pocket   - tramadol prn for severe pain  - f/u hepatitis panel   - ceftriaxone and flagyl for possible colitis   - f/u hepatology and GI consult     #severe hyponatremia - likely hypervolemic 2/2 increased free water intake   - patient asymptomatic   - fluid restriction  - f/u urine studies  - f/u repeat Na    #oliguria with + UA --> started ceftriaxone , f/u urine cx     #normocytic anemia with no signs of bleed  f/u anemia workup  monitor cbc and keep active type and screen     #MISC  - DVT PPx: low risk with elevated INR. SCD for now   - GI PPx: started protonix 40mg po qd   - Diet: npo for now until GI eval  - Activity: IAT  - Labs: ordered  - Code: Full code  - Dispo: SDU    - Medrec: confirmed with the patient . no home meds.  27 yo male previously healthy presented today for worsening jaundice and abdominal distended that started 2 weeks ago. Since his jaundice started, the patient began drinking a lot of fluids minimum 4L / day, his abdominal distention kept getting worse and his urine became darker, with oliguria. He reports SOB attributed to his abdomen pushing up on his diaphragm. Last BM yesterday, patient passing gas.  Patient denies any pain, fever, or any other signs of symptoms. No hx of mood disorder.   Social hx: former smoker stopped 2 years ago, has 3-4 PY.   alcohol: drinks every other day 4-5 beers, however last drink was 3 weeks ago.   No drugs., no allergies.   Patient admitted to SDU for severe hyponatremia, large abdominal ascites, and cholestatic liver injury.       # Acute cholestatic liver injury  # New onset large abdominal ascites  # Hepatic steatosis   # Possible diffuse colitis   -labs remarkable for WBC 16K, hb 10.7 (no BL), INR 5.69 , Na 119, AG 15, total bili 31.1 , aLP 175,   -ve alcohol level, acetaminophen and salicylate levels.  - CT abd pelv: Diffuse large amount of abdominal and pelvic ascites. Hepatosplenomegaly with right paracolic varices, findings suspicious for portal hypertension. Diffuse colonic wall thickening, possibly related to a diffuse colitis versus this severely edematous state of the patient. Nonspecific dilated loops of small bowel without definite evidence of obstruction.   Partial collapse of the right lower lobe, left lower lobe and right middle lobe.  - RUQ US : 1.  Enlarged fatty infiltrated liver (hepatomegaly with diffuse steatosis).  2.  Sludge within the gallbladder lumen which has a thickened edematous wall. No echogenic stones. Negative sonographic Carter's sign. Thickened edematous gallbladder wall is a nonspecific finding possibly related to volume status in this patient. 3.  Ascites is noted.  - ED attempted paracentesis however no pocket   - tramadol prn for severe pain  - f/u hepatitis panel   - ceftriaxone and flagyl for possible colitis   - f/u hepatology and GI consult     #severe hyponatremia - likely hypervolemic 2/2 increased free water intake   - patient asymptomatic   - fluid restriction  - f/u urine studies  - f/u repeat Na    #oliguria with + UA --> started ceftriaxone , f/u urine cx     #normocytic anemia with no signs of bleed  f/u anemia workup  monitor cbc and keep active type and screen     #MISC  - DVT PPx: low risk with elevated INR. SCD for now   - GI PPx: started protonix 40mg po qd   - Diet: npo for now until GI eval  - Activity: IAT  - Labs: ordered  - Code: Full code  - Dispo: SDU    - Medrec: confirmed with the patient . no home meds.

## 2023-11-16 NOTE — H&P ADULT - NSHPPHYSICALEXAM_GEN_ALL_CORE
GENERAL: NAD, well-developed.  HEAD:  Atraumatic, Normocephalic.  EYES: conjunctiva and sclera clear  CHEST/LUNG: GBAE. No wheezing or crackles   HEART: regular rate and rhythm; S1/S2.  ABDOMEN: Soft, Nontender, Nondistended  EXTREMITIES: No edema.   PSYCH: AAOx3.  NEUROLOGY: non-focal; moves all extremities GENERAL: Jaundiced.   HEAD:  Atraumatic, Normocephalic.  EYES: icteric   CHEST/LUNG: decreased air entry at lung bases.  HEART: fast regular rate and rhythm; S1/S2.  ABDOMEN: Soft, distended with fluid shift   EXTREMITIES: No edema.   PSYCH: AAOx3.  NEUROLOGY: non-focal; moves all extremities

## 2023-11-16 NOTE — H&P ADULT - ATTENDING COMMENTS
27 yo male previously healthy presented today for worsening jaundice and abdominal distended that started 2 weeks ago. Since his jaundice started, the patient began drinking a lot of fluids minimum 4L / day, his abdominal distention kept getting worse and his urine became darker, with oliguria. He reports SOB attributed to his abdomen pushing up on his diaphragm. Last BM yesterday, patient passing gas.  Patient denies any pain, fever, or any other signs of symptoms. No hx of mood disorder.   Social hx: former smoker stopped 2 years ago, has 3-4 PY.   alcohol: drinks every other day 4-5 beers, however last drink was 3 weeks ago.     1. Acute liver failure unclear etiology but might be related to alcohol complicated by ascites   * pt drink 4-5 beers every other day . last drink was 3 weeks ago   - Admit to medicine                   - INR:   2.69            - Hepatitis panel:pending            -Alcohol level: negative         - Tylenol : negative   - [facility] protocol   - Glucose check    -Jax disease lab AP/Bilirubin is >4 not in favor of jxa, AST/ALT high which is in favor of alcohol abuse and jax   - Lactulose daily as prophylaxis for hepatic encephalopathy   - Paracentesis:pending   - Cont rocephin and flagyl but doubt colitis   - CT abd pelv:   a) Diffuse large amount of abdominal and pelvic ascites. Hepatosplenomegaly with right paracolic varices, findings suspicious for portal hypertension. Diffuse colonic wall thickening, possibly related to a diffuse colitis versus this severely edematous state of the patient. Nonspecific dilated loops of small bowel without definite evidence of obstruction.   b) Partial collapse of the right lower lobe, left lower lobe and right middle lobe.  - RUQ US :   a)  Enlarged fatty infiltrated liver (hepatomegaly with diffuse steatosis).  b) Sludge within the gallbladder lumen which has a thickened edematous wall. No echogenic stones. Negative sonographic Carter's sign. Thickened edematous gallbladder wall is a nonspecific finding possibly related to volume status in this patient.   c)  Ascites is noted.  - Hepatology consult:pending  - GI consult:pending     2. severe Subacute hyponatremia - likely hypervolemic 2/2 increased free water intake   *  patient asymptomatic   - BMP at least 3 times a day with a target of 6-8 in 24 hours (likely I will go with 6)   - fluid restriction  - Urine studies   - Nephrology consult:pending     3. oliguria with + UA --> started ceftriaxone , f/u urine cx     4. normocytic anemia with no signs of bleed    #MISC  - DVT PPx: low risk with elevated INR. SCD for now   - GI PPx: started protonix 40mg po qd   - Diet: npo for now until GI eval  - Activity: IAT  - Labs: ordered  - Code: Full code  - Dispo: SDU 27 yo male previously healthy presented today for worsening jaundice and abdominal distended that started 2 weeks ago. Since his jaundice started, the patient began drinking a lot of fluids minimum 4L / day, his abdominal distention kept getting worse and his urine became darker, with oliguria. He reports SOB attributed to his abdomen pushing up on his diaphragm. Last BM yesterday, patient passing gas.  Patient denies any pain, fever, or any other signs of symptoms. No hx of mood disorder.   Social hx: former smoker stopped 2 years ago, has 3-4 PY.   alcohol: drinks every other day 4-5 beers, however last drink was 3 weeks ago.     1. Acute liver failure unclear etiology but might be related to alcohol complicated by ascites   * pt drink 4-5 beers every other day . last drink was 3 weeks ago   - Admit to medicine                   - INR:   2.69            - Hepatitis panel:pending            -Alcohol level: negative         - Tylenol : negative   - [facility] protocol   - Glucose check    -Jax disease lab AP/Bilirubin is >4 not in favor of jax, AST/ALT high which is in favor of alcohol abuse and jax   - Lactulose daily as prophylaxis for hepatic encephalopathy   - Paracentesis:pending   - Cont rocephin and flagyl but doubt colitis   - CT abd pelv:   a) Diffuse large amount of abdominal and pelvic ascites. Hepatosplenomegaly with right paracolic varices, findings suspicious for portal hypertension. Diffuse colonic wall thickening, possibly related to a diffuse colitis versus this severely edematous state of the patient. Nonspecific dilated loops of small bowel without definite evidence of obstruction.   b) Partial collapse of the right lower lobe, left lower lobe and right middle lobe.  - RUQ US :   a)  Enlarged fatty infiltrated liver (hepatomegaly with diffuse steatosis).  b) Sludge within the gallbladder lumen which has a thickened edematous wall. No echogenic stones. Negative sonographic Carter's sign. Thickened edematous gallbladder wall is a nonspecific finding possibly related to volume status in this patient.   c)  Ascites is noted.  - Hepatology consult:pending  - GI consult:pending     2. severe Subacute hyponatremia - likely hypervolemic 2/2 increased free water intake   *  patient asymptomatic   - BMP at least 3 times a day with a target of 6-8 in 24 hours (likely I will go with 6)   - fluid restriction  - Urine studies   - Nephrology consult:pending     3. oliguria with + UA --> started ceftriaxone , f/u urine cx     4. normocytic anemia with no signs of bleed    #MISC  - DVT PPx: low risk with elevated INR. SCD for now   - GI PPx: started protonix 40mg po qd   - Diet: npo for now until GI eval  - Activity: IAT  - Labs: ordered  - Code: Full code  - Dispo: SDU 27 yo male previously healthy presented today for worsening jaundice and abdominal distended that started 2 weeks ago. Since his jaundice started, the patient began drinking a lot of fluids minimum 4L / day, his abdominal distention kept getting worse and his urine became darker, with oliguria. He reports SOB attributed to his abdomen pushing up on his diaphragm. Last BM yesterday, patient passing gas.  Patient denies any pain, fever, or any other signs of symptoms. No hx of mood disorder.   Social hx: former smoker stopped 2 years ago, has 3-4 PY.   alcohol: drinks every other day 4-5 beers, however last drink was 3 weeks ago.     1. Acute liver failure unclear etiology but might be related to alcohol complicated by ascites   * pt drink 4-5 beers every other day . last drink was 3 weeks ago   - Admit to medicine                   - INR:   2.69            - Hepatitis panel:pending            -Alcohol level: negative         - Tylenol : negative   - [facility] protocol   - Glucose check    -Jax disease lab AP/Bilirubin is >4 not in favor of jax, AST/ALT high which is in favor of alcohol abuse and jax   - Lactulose daily as prophylaxis for hepatic encephalopathy   - Paracentesis:pending   - Cont rocephin and flagyl but doubt colitis   - CT abd pelv:   a) Diffuse large amount of abdominal and pelvic ascites. Hepatosplenomegaly with right paracolic varices, findings suspicious for portal hypertension. Diffuse colonic wall thickening, possibly related to a diffuse colitis versus this severely edematous state of the patient. Nonspecific dilated loops of small bowel without definite evidence of obstruction.   b) Partial collapse of the right lower lobe, left lower lobe and right middle lobe.  - RUQ US :   a)  Enlarged fatty infiltrated liver (hepatomegaly with diffuse steatosis).  b) Sludge within the gallbladder lumen which has a thickened edematous wall. No echogenic stones. Negative sonographic Carter's sign. Thickened edematous gallbladder wall is a nonspecific finding possibly related to volume status in this patient.   c)  Ascites is noted.  - Hepatology consult:pending  - GI consult:pending     2. severe Subacute hyponatremia - likely hypervolemic 2/2 increased free water intake   *  patient asymptomatic   - BMP at least 3 times a day with a target of 6-8 in 24 hours (likely I will go with 6)   - fluid restriction  - Urine studies   - Nephrology consult:pending     3. oliguria with + UA --> started ceftriaxone , f/u urine cx     4. normocytic anemia with no signs of bleed    #MISC  - DVT PPx: low risk with elevated INR. SCD for now   - GI PPx: started protonix 40mg po qd   - Diet: npo for now until GI eval  - Activity: IAT  - Labs: ordered  - Code: Full code  - Dispo: SDU 27 yo male previously healthy presented today for worsening jaundice and abdominal distended that started 2 weeks ago. Since his jaundice started, the patient began drinking a lot of fluids minimum 4L / day, his abdominal distention kept getting worse and his urine became darker, with oliguria. He reports SOB attributed to his abdomen pushing up on his diaphragm. Last BM yesterday, patient passing gas.  Patient denies any pain, fever, or any other signs of symptoms. No hx of mood disorder.   Social hx: former smoker stopped 2 years ago, has 3-4 PY.   alcohol: drinks every other day 4-5 beers, however last drink was 3 weeks ago.     1. Acute liver failure unclear etiology but might be related to alcohol complicated by ascites   * pt drink 4-5 beers every day and binge drink during weekend [place] during football season   - Admit to medicine                   - INR:   2.69            - Hepatitis panel:pending            -Alcohol level: negative         - Tylenol : negative   - [facility] protocol   - Glucose check    -Ramiro disease lab AP/Bilirubin is >4 not in favor of ramiro, AST/ALT high which is in favor of alcohol abuse and ramiro   - Lactulose daily as prophylaxis for hepatic encephalopathy   - Trial of vitamin K for 3 days   - Paracentesis: done no infection noted   - Cont rocephin and flagyl but doubt colitis   - No prednisone as per hepatology for now as it has worst outcome if pt needs transplant.   - CT abd pelv:   a) Diffuse large amount of abdominal and pelvic ascites. Hepatosplenomegaly with right paracolic varices, findings suspicious for portal hypertension. Diffuse colonic wall thickening, possibly related to a diffuse colitis versus this severely edematous state of the patient. Nonspecific dilated loops of small bowel without definite evidence of obstruction.   b) Partial collapse of the right lower lobe, left lower lobe and right middle lobe.  - RUQ US :   a)  Enlarged fatty infiltrated liver (hepatomegaly with diffuse steatosis).  b) Sludge within the gallbladder lumen which has a thickened edematous wall. No echogenic stones. Negative sonographic Carter's sign. Thickened edematous gallbladder wall is a nonspecific finding possibly related to volume status in this patient.   c)  Ascites is noted.  - Critical care consult appreciated   - Hepatology consult:pending  - GI consult:pending     2. severe Subacute hyponatremia - likely hypervolemic 2/2 increased free water intake slowly correcting   *  patient asymptomatic   - BMP at least 3 times a day with a target of 6-8 in 24 hours (likely I will go with 6)   - fluid restriction  - Urine studies   - Nephrology consult:pending     3. oliguria with + UA --> started ceftriaxone , f/u urine cx     4. normocytic anemia with no signs of bleed    #MISC  - DVT PPx: low risk with elevated INR. SCD for now   - GI PPx: started protonix 40mg po qd   - Diet: npo for now until GI eval  - Activity: IAT  - Labs: ordered  - Code: Full code  - Dispo: SDU    Monitoring patient today. if labs getting worse ([place] INR), might need transfer to a transplant center. 29 yo male previously healthy presented today for worsening jaundice and abdominal distended that started 2 weeks ago. Since his jaundice started, the patient began drinking a lot of fluids minimum 4L / day, his abdominal distention kept getting worse and his urine became darker, with oliguria. He reports SOB attributed to his abdomen pushing up on his diaphragm. Last BM yesterday, patient passing gas.  Patient denies any pain, fever, or any other signs of symptoms. No hx of mood disorder.   Social hx: former smoker stopped 2 years ago, has 3-4 PY.   alcohol: drinks every other day 4-5 beers, however last drink was 3 weeks ago.     1. Acute liver failure unclear etiology but might be related to alcohol complicated by ascites   * pt drink 4-5 beers every day and binge drink during weekend [place] during football season   - Admit to medicine                   - INR:   2.69            - Hepatitis panel:pending            -Alcohol level: negative         - Tylenol : negative   - [facility] protocol   - Glucose check    -Ramiro disease lab AP/Bilirubin is >4 not in favor of ramiro, AST/ALT high which is in favor of alcohol abuse and ramiro   - Lactulose daily as prophylaxis for hepatic encephalopathy   - Trial of vitamin K for 3 days   - Paracentesis: done no infection noted   - Cont rocephin and flagyl but doubt colitis   - No prednisone as per hepatology for now as it has worst outcome if pt needs transplant.   - CT abd pelv:   a) Diffuse large amount of abdominal and pelvic ascites. Hepatosplenomegaly with right paracolic varices, findings suspicious for portal hypertension. Diffuse colonic wall thickening, possibly related to a diffuse colitis versus this severely edematous state of the patient. Nonspecific dilated loops of small bowel without definite evidence of obstruction.   b) Partial collapse of the right lower lobe, left lower lobe and right middle lobe.  - RUQ US :   a)  Enlarged fatty infiltrated liver (hepatomegaly with diffuse steatosis).  b) Sludge within the gallbladder lumen which has a thickened edematous wall. No echogenic stones. Negative sonographic Carter's sign. Thickened edematous gallbladder wall is a nonspecific finding possibly related to volume status in this patient.   c)  Ascites is noted.  - Critical care consult appreciated   - Hepatology consult:pending  - GI consult:pending     2. severe Subacute hyponatremia - likely hypervolemic 2/2 increased free water intake slowly correcting   *  patient asymptomatic   - BMP at least 3 times a day with a target of 6-8 in 24 hours (likely I will go with 6)   - fluid restriction  - Urine studies   - Nephrology consult:pending     3. oliguria with + UA --> started ceftriaxone , f/u urine cx     4. normocytic anemia with no signs of bleed    #MISC  - DVT PPx: low risk with elevated INR. SCD for now   - GI PPx: started protonix 40mg po qd   - Diet: npo for now until GI eval  - Activity: IAT  - Labs: ordered  - Code: Full code  - Dispo: SDU    Monitoring patient today. if labs getting worse ([place] INR), might need transfer to a transplant center. 27 yo male previously healthy presented today for worsening jaundice and abdominal distended that started 2 weeks ago. Since his jaundice started, the patient began drinking a lot of fluids minimum 4L / day, his abdominal distention kept getting worse and his urine became darker, with oliguria. He reports SOB attributed to his abdomen pushing up on his diaphragm. Last BM yesterday, patient passing gas.  Patient denies any pain, fever, or any other signs of symptoms. No hx of mood disorder.   Social hx: former smoker stopped 2 years ago, has 3-4 PY.   alcohol: drinks every other day 4-5 beers, however last drink was 3 weeks ago.     1. Acute liver failure unclear etiology but might be related to alcohol complicated by ascites   * pt drink 4-5 beers every day and binge drink during weekend [place] during football season   - Admit to medicine                   - INR:   2.69            - Hepatitis panel:pending            -Alcohol level: negative         - Tylenol : negative   - [facility] protocol   - Glucose check    -Ramiro disease lab AP/Bilirubin is >4 not in favor of ramiro, AST/ALT high which is in favor of alcohol abuse and ramiro   - Lactulose daily as prophylaxis for hepatic encephalopathy   - Trial of vitamin K for 3 days   - Paracentesis: done no infection noted   - Cont rocephin and flagyl but doubt colitis   - No prednisone as per hepatology for now as it has worst outcome if pt needs transplant.   - CT abd pelv:   a) Diffuse large amount of abdominal and pelvic ascites. Hepatosplenomegaly with right paracolic varices, findings suspicious for portal hypertension. Diffuse colonic wall thickening, possibly related to a diffuse colitis versus this severely edematous state of the patient. Nonspecific dilated loops of small bowel without definite evidence of obstruction.   b) Partial collapse of the right lower lobe, left lower lobe and right middle lobe.  - RUQ US :   a)  Enlarged fatty infiltrated liver (hepatomegaly with diffuse steatosis).  b) Sludge within the gallbladder lumen which has a thickened edematous wall. No echogenic stones. Negative sonographic Carter's sign. Thickened edematous gallbladder wall is a nonspecific finding possibly related to volume status in this patient.   c)  Ascites is noted.  - Critical care consult appreciated   - Hepatology consult:pending  - GI consult:pending     2. severe Subacute hyponatremia - likely hypervolemic 2/2 increased free water intake slowly correcting   *  patient asymptomatic   - BMP at least 3 times a day with a target of 6-8 in 24 hours (likely I will go with 6)   - fluid restriction  - Urine studies   - Nephrology consult:pending     3. oliguria with + UA --> started ceftriaxone , f/u urine cx     4. normocytic anemia with no signs of bleed    #MISC  - DVT PPx: low risk with elevated INR. SCD for now   - GI PPx: started protonix 40mg po qd   - Diet: npo for now until GI eval  - Activity: IAT  - Labs: ordered  - Code: Full code  - Dispo: SDU    Monitoring patient today. if labs getting worse ([place] INR), might need transfer to a transplant center.

## 2023-11-16 NOTE — ED ADULT NURSE REASSESSMENT NOTE - NS ED NURSE REASSESS COMMENT FT1
Patient endorsed from PM RN shift. Patient alert and orientated x4. Patient stable. NAD noted. Fall precautious maintained. Cardiac monitor maintained. Mom at bedside. As per ER PM RN report given to ED3 Patient endorsed from PM RN shift. Patient alert and orientated x4. Patient stable. NAD noted. Fall precautious maintained. Cardiac monitor maintained. Mom at bedside. IV intact.  As per ER PM RN report given to ED3

## 2023-11-17 LAB
ALBUMIN FLD-MCNC: 0.4 G/DL — SIGNIFICANT CHANGE UP
ALBUMIN FLD-MCNC: 0.4 G/DL — SIGNIFICANT CHANGE UP
ALBUMIN SERPL ELPH-MCNC: 2.5 G/DL — LOW (ref 3.5–5.2)
ALBUMIN SERPL ELPH-MCNC: 2.5 G/DL — LOW (ref 3.5–5.2)
ALBUMIN SERPL ELPH-MCNC: 2.7 G/DL — LOW (ref 3.5–5.2)
ALP SERPL-CCNC: 143 U/L — HIGH (ref 30–115)
ALP SERPL-CCNC: 143 U/L — HIGH (ref 30–115)
ALP SERPL-CCNC: 147 U/L — HIGH (ref 30–115)
ALP SERPL-CCNC: 147 U/L — HIGH (ref 30–115)
ALP SERPL-CCNC: 150 U/L — HIGH (ref 30–115)
ALP SERPL-CCNC: 150 U/L — HIGH (ref 30–115)
ALT FLD-CCNC: 13 U/L — SIGNIFICANT CHANGE UP (ref 0–41)
ALT FLD-CCNC: 13 U/L — SIGNIFICANT CHANGE UP (ref 0–41)
ALT FLD-CCNC: 14 U/L — SIGNIFICANT CHANGE UP (ref 0–41)
ANION GAP SERPL CALC-SCNC: 13 MMOL/L — SIGNIFICANT CHANGE UP (ref 7–14)
ANION GAP SERPL CALC-SCNC: 13 MMOL/L — SIGNIFICANT CHANGE UP (ref 7–14)
ANION GAP SERPL CALC-SCNC: 14 MMOL/L — SIGNIFICANT CHANGE UP (ref 7–14)
ANION GAP SERPL CALC-SCNC: 14 MMOL/L — SIGNIFICANT CHANGE UP (ref 7–14)
AST SERPL-CCNC: 140 U/L — HIGH (ref 0–41)
AST SERPL-CCNC: 140 U/L — HIGH (ref 0–41)
AST SERPL-CCNC: 141 U/L — HIGH (ref 0–41)
AST SERPL-CCNC: 141 U/L — HIGH (ref 0–41)
AST SERPL-CCNC: 144 U/L — HIGH (ref 0–41)
AST SERPL-CCNC: 144 U/L — HIGH (ref 0–41)
BASOPHILS # BLD AUTO: 0.06 K/UL — SIGNIFICANT CHANGE UP (ref 0–0.2)
BASOPHILS # BLD AUTO: 0.06 K/UL — SIGNIFICANT CHANGE UP (ref 0–0.2)
BASOPHILS NFR BLD AUTO: 0.4 % — SIGNIFICANT CHANGE UP (ref 0–1)
BASOPHILS NFR BLD AUTO: 0.4 % — SIGNIFICANT CHANGE UP (ref 0–1)
BILIRUB DIRECT SERPL-MCNC: 20.1 MG/DL — HIGH (ref 0–0.3)
BILIRUB DIRECT SERPL-MCNC: 20.1 MG/DL — HIGH (ref 0–0.3)
BILIRUB DIRECT SERPL-MCNC: 20.3 MG/DL — HIGH (ref 0–0.3)
BILIRUB DIRECT SERPL-MCNC: 20.3 MG/DL — HIGH (ref 0–0.3)
BILIRUB DIRECT SERPL-MCNC: >17.2 MG/DL — SIGNIFICANT CHANGE UP (ref 0–0.3)
BILIRUB DIRECT SERPL-MCNC: >17.2 MG/DL — SIGNIFICANT CHANGE UP (ref 0–0.3)
BILIRUB INDIRECT FLD-MCNC: 10.5 MG/DL — HIGH (ref 0.2–1.2)
BILIRUB INDIRECT FLD-MCNC: 10.5 MG/DL — HIGH (ref 0.2–1.2)
BILIRUB INDIRECT FLD-MCNC: 9.6 MG/DL — HIGH (ref 0.2–1.2)
BILIRUB INDIRECT FLD-MCNC: 9.6 MG/DL — HIGH (ref 0.2–1.2)
BILIRUB INDIRECT FLD-MCNC: SIGNIFICANT CHANGE UP MG/DL (ref 0.2–1.2)
BILIRUB INDIRECT FLD-MCNC: SIGNIFICANT CHANGE UP MG/DL (ref 0.2–1.2)
BILIRUB SERPL-MCNC: 29.9 MG/DL — CRITICAL HIGH (ref 0.2–1.2)
BILIRUB SERPL-MCNC: 29.9 MG/DL — CRITICAL HIGH (ref 0.2–1.2)
BILIRUB SERPL-MCNC: 30.5 MG/DL — CRITICAL HIGH (ref 0.2–1.2)
BILIRUB SERPL-MCNC: 30.5 MG/DL — CRITICAL HIGH (ref 0.2–1.2)
BILIRUB SERPL-MCNC: 30.6 MG/DL — CRITICAL HIGH (ref 0.2–1.2)
BILIRUB SERPL-MCNC: 30.6 MG/DL — CRITICAL HIGH (ref 0.2–1.2)
BILIRUB SERPL-MCNC: 34.3 MG/DL — CRITICAL HIGH (ref 0.2–1.2)
BILIRUB SERPL-MCNC: 34.3 MG/DL — CRITICAL HIGH (ref 0.2–1.2)
BUN SERPL-MCNC: 23 MG/DL — HIGH (ref 10–20)
CALCIUM SERPL-MCNC: 7.9 MG/DL — LOW (ref 8.4–10.5)
CERULOPLASMIN SERPL-MCNC: 25 MG/DL — SIGNIFICANT CHANGE UP (ref 15–30)
CERULOPLASMIN SERPL-MCNC: 25 MG/DL — SIGNIFICANT CHANGE UP (ref 15–30)
CHLORIDE SERPL-SCNC: 88 MMOL/L — LOW (ref 98–110)
CO2 SERPL-SCNC: 22 MMOL/L — SIGNIFICANT CHANGE UP (ref 17–32)
CREAT ?TM UR-MCNC: 127 MG/DL — SIGNIFICANT CHANGE UP
CREAT ?TM UR-MCNC: 127 MG/DL — SIGNIFICANT CHANGE UP
CREAT SERPL-MCNC: 1.2 MG/DL — SIGNIFICANT CHANGE UP (ref 0.7–1.5)
CREAT SERPL-MCNC: 1.3 MG/DL — SIGNIFICANT CHANGE UP (ref 0.7–1.5)
CREAT SERPL-MCNC: 1.3 MG/DL — SIGNIFICANT CHANGE UP (ref 0.7–1.5)
CULTURE RESULTS: SIGNIFICANT CHANGE UP
CULTURE RESULTS: SIGNIFICANT CHANGE UP
EGFR: 76 ML/MIN/1.73M2 — SIGNIFICANT CHANGE UP
EGFR: 76 ML/MIN/1.73M2 — SIGNIFICANT CHANGE UP
EGFR: 83 ML/MIN/1.73M2 — SIGNIFICANT CHANGE UP
EOSINOPHIL # BLD AUTO: 0.12 K/UL — SIGNIFICANT CHANGE UP (ref 0–0.7)
EOSINOPHIL # BLD AUTO: 0.12 K/UL — SIGNIFICANT CHANGE UP (ref 0–0.7)
EOSINOPHIL NFR BLD AUTO: 0.9 % — SIGNIFICANT CHANGE UP (ref 0–8)
EOSINOPHIL NFR BLD AUTO: 0.9 % — SIGNIFICANT CHANGE UP (ref 0–8)
FERRITIN SERPL-MCNC: 1312 NG/ML — HIGH (ref 30–400)
FERRITIN SERPL-MCNC: 1312 NG/ML — HIGH (ref 30–400)
FOLATE SERPL-MCNC: 2.7 NG/ML — LOW
FOLATE SERPL-MCNC: 2.7 NG/ML — LOW
GLUCOSE FLD-MCNC: 119 MG/DL — SIGNIFICANT CHANGE UP
GLUCOSE FLD-MCNC: 119 MG/DL — SIGNIFICANT CHANGE UP
GLUCOSE SERPL-MCNC: 105 MG/DL — HIGH (ref 70–99)
GLUCOSE SERPL-MCNC: 105 MG/DL — HIGH (ref 70–99)
GLUCOSE SERPL-MCNC: 112 MG/DL — HIGH (ref 70–99)
GLUCOSE SERPL-MCNC: 112 MG/DL — HIGH (ref 70–99)
GRAM STN FLD: SIGNIFICANT CHANGE UP
GRAM STN FLD: SIGNIFICANT CHANGE UP
HAV IGM SER-ACNC: SIGNIFICANT CHANGE UP
HBV CORE AB SER-ACNC: SIGNIFICANT CHANGE UP
HBV CORE AB SER-ACNC: SIGNIFICANT CHANGE UP
HBV CORE IGM SER-ACNC: SIGNIFICANT CHANGE UP
HBV CORE IGM SER-ACNC: SIGNIFICANT CHANGE UP
HBV SURFACE AB SER-ACNC: SIGNIFICANT CHANGE UP
HBV SURFACE AB SER-ACNC: SIGNIFICANT CHANGE UP
HBV SURFACE AG SER-ACNC: SIGNIFICANT CHANGE UP
HBV SURFACE AG SER-ACNC: SIGNIFICANT CHANGE UP
HCT VFR BLD CALC: 26.2 % — LOW (ref 42–52)
HCT VFR BLD CALC: 26.2 % — LOW (ref 42–52)
HCV AB S/CO SERPL IA: 0.09 S/CO — SIGNIFICANT CHANGE UP (ref 0–0.99)
HCV AB S/CO SERPL IA: 0.09 S/CO — SIGNIFICANT CHANGE UP (ref 0–0.99)
HCV AB SERPL-IMP: SIGNIFICANT CHANGE UP
HCV AB SERPL-IMP: SIGNIFICANT CHANGE UP
HGB BLD-MCNC: 9.6 G/DL — LOW (ref 14–18)
HGB BLD-MCNC: 9.6 G/DL — LOW (ref 14–18)
HIV 1+2 AB+HIV1 P24 AG SERPL QL IA: SIGNIFICANT CHANGE UP
HIV 1+2 AB+HIV1 P24 AG SERPL QL IA: SIGNIFICANT CHANGE UP
IMM GRANULOCYTES NFR BLD AUTO: 1.2 % — HIGH (ref 0.1–0.3)
IMM GRANULOCYTES NFR BLD AUTO: 1.2 % — HIGH (ref 0.1–0.3)
INR BLD: 2.08 RATIO — HIGH (ref 0.65–1.3)
INR BLD: 2.08 RATIO — HIGH (ref 0.65–1.3)
INR BLD: 2.43 RATIO — HIGH (ref 0.65–1.3)
INR BLD: 2.43 RATIO — HIGH (ref 0.65–1.3)
INR BLD: 2.63 RATIO — HIGH (ref 0.65–1.3)
INR BLD: 2.63 RATIO — HIGH (ref 0.65–1.3)
LDH SERPL L TO P-CCNC: 45 U/L — SIGNIFICANT CHANGE UP
LDH SERPL L TO P-CCNC: 45 U/L — SIGNIFICANT CHANGE UP
LYMPHOCYTES # BLD AUTO: 1.35 K/UL — SIGNIFICANT CHANGE UP (ref 1.2–3.4)
LYMPHOCYTES # BLD AUTO: 1.35 K/UL — SIGNIFICANT CHANGE UP (ref 1.2–3.4)
LYMPHOCYTES # BLD AUTO: 9.7 % — LOW (ref 20.5–51.1)
LYMPHOCYTES # BLD AUTO: 9.7 % — LOW (ref 20.5–51.1)
MAGNESIUM SERPL-MCNC: 2.6 MG/DL — HIGH (ref 1.8–2.4)
MAGNESIUM SERPL-MCNC: 2.6 MG/DL — HIGH (ref 1.8–2.4)
MCHC RBC-ENTMCNC: 35.2 PG — HIGH (ref 27–31)
MCHC RBC-ENTMCNC: 35.2 PG — HIGH (ref 27–31)
MCHC RBC-ENTMCNC: 36.6 G/DL — SIGNIFICANT CHANGE UP (ref 32–37)
MCHC RBC-ENTMCNC: 36.6 G/DL — SIGNIFICANT CHANGE UP (ref 32–37)
MCV RBC AUTO: 96 FL — HIGH (ref 80–94)
MCV RBC AUTO: 96 FL — HIGH (ref 80–94)
MELD SCORE WITH DIALYSIS: >40 POINTS — SIGNIFICANT CHANGE UP
MELD SCORE WITH DIALYSIS: >40 POINTS — SIGNIFICANT CHANGE UP
MELD SCORE WITHOUT DIALYSIS: 34 POINTS — SIGNIFICANT CHANGE UP
MELD SCORE WITHOUT DIALYSIS: 34 POINTS — SIGNIFICANT CHANGE UP
MONOCYTES # BLD AUTO: 1.23 K/UL — HIGH (ref 0.1–0.6)
MONOCYTES # BLD AUTO: 1.23 K/UL — HIGH (ref 0.1–0.6)
MONOCYTES NFR BLD AUTO: 8.9 % — SIGNIFICANT CHANGE UP (ref 1.7–9.3)
MONOCYTES NFR BLD AUTO: 8.9 % — SIGNIFICANT CHANGE UP (ref 1.7–9.3)
NEUTROPHILS # BLD AUTO: 10.94 K/UL — HIGH (ref 1.4–6.5)
NEUTROPHILS # BLD AUTO: 10.94 K/UL — HIGH (ref 1.4–6.5)
NEUTROPHILS NFR BLD AUTO: 78.9 % — HIGH (ref 42.2–75.2)
NEUTROPHILS NFR BLD AUTO: 78.9 % — HIGH (ref 42.2–75.2)
NRBC # BLD: 0 /100 WBCS — SIGNIFICANT CHANGE UP (ref 0–0)
NRBC # BLD: 0 /100 WBCS — SIGNIFICANT CHANGE UP (ref 0–0)
PHOSPHATE SERPL-MCNC: 3.6 MG/DL — SIGNIFICANT CHANGE UP (ref 2.1–4.9)
PHOSPHATE SERPL-MCNC: 3.6 MG/DL — SIGNIFICANT CHANGE UP (ref 2.1–4.9)
PLATELET # BLD AUTO: 208 K/UL — SIGNIFICANT CHANGE UP (ref 130–400)
PLATELET # BLD AUTO: 208 K/UL — SIGNIFICANT CHANGE UP (ref 130–400)
PMV BLD: 9.8 FL — SIGNIFICANT CHANGE UP (ref 7.4–10.4)
PMV BLD: 9.8 FL — SIGNIFICANT CHANGE UP (ref 7.4–10.4)
POTASSIUM SERPL-MCNC: 3.4 MMOL/L — LOW (ref 3.5–5)
POTASSIUM SERPL-MCNC: 3.4 MMOL/L — LOW (ref 3.5–5)
POTASSIUM SERPL-MCNC: 3.6 MMOL/L — SIGNIFICANT CHANGE UP (ref 3.5–5)
POTASSIUM SERPL-MCNC: 3.6 MMOL/L — SIGNIFICANT CHANGE UP (ref 3.5–5)
POTASSIUM SERPL-SCNC: 3.4 MMOL/L — LOW (ref 3.5–5)
POTASSIUM SERPL-SCNC: 3.4 MMOL/L — LOW (ref 3.5–5)
POTASSIUM SERPL-SCNC: 3.6 MMOL/L — SIGNIFICANT CHANGE UP (ref 3.5–5)
POTASSIUM SERPL-SCNC: 3.6 MMOL/L — SIGNIFICANT CHANGE UP (ref 3.5–5)
PROT ?TM UR-MCNC: 80 MG/DLG/24H — SIGNIFICANT CHANGE UP
PROT ?TM UR-MCNC: 80 MG/DLG/24H — SIGNIFICANT CHANGE UP
PROT FLD-MCNC: 1 G/DL — SIGNIFICANT CHANGE UP
PROT FLD-MCNC: 1 G/DL — SIGNIFICANT CHANGE UP
PROT SERPL-MCNC: 5 G/DL — LOW (ref 6–8)
PROT/CREAT UR-RTO: 0.6 RATIO — HIGH (ref 0–0.2)
PROT/CREAT UR-RTO: 0.6 RATIO — HIGH (ref 0–0.2)
PROTHROM AB SERPL-ACNC: 23.9 SEC — HIGH (ref 9.95–12.87)
PROTHROM AB SERPL-ACNC: 23.9 SEC — HIGH (ref 9.95–12.87)
PROTHROM AB SERPL-ACNC: 28 SEC — HIGH (ref 9.95–12.87)
PROTHROM AB SERPL-ACNC: 28 SEC — HIGH (ref 9.95–12.87)
PROTHROM AB SERPL-ACNC: 30.3 SEC — HIGH (ref 9.95–12.87)
PROTHROM AB SERPL-ACNC: 30.3 SEC — HIGH (ref 9.95–12.87)
RBC # BLD: 2.73 M/UL — LOW (ref 4.7–6.1)
RBC # BLD: 2.73 M/UL — LOW (ref 4.7–6.1)
RBC # FLD: 19.4 % — HIGH (ref 11.5–14.5)
RBC # FLD: 19.4 % — HIGH (ref 11.5–14.5)
SODIUM SERPL-SCNC: 123 MMOL/L — LOW (ref 135–146)
SODIUM SERPL-SCNC: 123 MMOL/L — LOW (ref 135–146)
SODIUM SERPL-SCNC: 124 MMOL/L — LOW (ref 135–146)
SODIUM SERPL-SCNC: 124 MMOL/L — LOW (ref 135–146)
SODIUM SERPL-SCNC: 126 MMOL/L — LOW (ref 135–146)
SODIUM SERPL-SCNC: 126 MMOL/L — LOW (ref 135–146)
SPECIMEN SOURCE: SIGNIFICANT CHANGE UP
UUN UR-MCNC: 888 MG/DL — SIGNIFICANT CHANGE UP
UUN UR-MCNC: 888 MG/DL — SIGNIFICANT CHANGE UP
VIT B12 SERPL-MCNC: >2000 PG/ML — HIGH (ref 232–1245)
VIT B12 SERPL-MCNC: >2000 PG/ML — HIGH (ref 232–1245)
WBC # BLD: 13.87 K/UL — HIGH (ref 4.8–10.8)
WBC # BLD: 13.87 K/UL — HIGH (ref 4.8–10.8)
WBC # FLD AUTO: 13.87 K/UL — HIGH (ref 4.8–10.8)
WBC # FLD AUTO: 13.87 K/UL — HIGH (ref 4.8–10.8)

## 2023-11-17 PROCEDURE — 99233 SBSQ HOSP IP/OBS HIGH 50: CPT

## 2023-11-17 RX ORDER — INFLUENZA VIRUS VACCINE 15; 15; 15; 15 UG/.5ML; UG/.5ML; UG/.5ML; UG/.5ML
0.5 SUSPENSION INTRAMUSCULAR ONCE
Refills: 0 | Status: DISCONTINUED | OUTPATIENT
Start: 2023-11-17 | End: 2023-11-22

## 2023-11-17 RX ORDER — POTASSIUM CHLORIDE 20 MEQ
20 PACKET (EA) ORAL ONCE
Refills: 0 | Status: COMPLETED | OUTPATIENT
Start: 2023-11-17 | End: 2023-11-17

## 2023-11-17 RX ORDER — LACTULOSE 10 G/15ML
10 SOLUTION ORAL THREE TIMES A DAY
Refills: 0 | Status: DISCONTINUED | OUTPATIENT
Start: 2023-11-17 | End: 2023-11-18

## 2023-11-17 RX ORDER — ALBUMIN HUMAN 25 %
300 VIAL (ML) INTRAVENOUS
Refills: 0 | Status: COMPLETED | OUTPATIENT
Start: 2023-11-17 | End: 2023-11-18

## 2023-11-17 RX ORDER — CHLORHEXIDINE GLUCONATE 213 G/1000ML
1 SOLUTION TOPICAL DAILY
Refills: 0 | Status: DISCONTINUED | OUTPATIENT
Start: 2023-11-17 | End: 2023-11-22

## 2023-11-17 RX ADMIN — LACTULOSE 10 GRAM(S): 10 SOLUTION ORAL at 14:07

## 2023-11-17 RX ADMIN — LACTULOSE 10 GRAM(S): 10 SOLUTION ORAL at 21:45

## 2023-11-17 RX ADMIN — Medication 150 MILLILITER(S): at 12:14

## 2023-11-17 RX ADMIN — Medication 100 MILLIGRAM(S): at 21:45

## 2023-11-17 RX ADMIN — Medication 50 MILLIEQUIVALENT(S): at 12:13

## 2023-11-17 RX ADMIN — PANTOPRAZOLE SODIUM 40 MILLIGRAM(S): 20 TABLET, DELAYED RELEASE ORAL at 05:27

## 2023-11-17 RX ADMIN — Medication 100 MILLIGRAM(S): at 14:11

## 2023-11-17 RX ADMIN — Medication 100 MILLIGRAM(S): at 05:27

## 2023-11-17 RX ADMIN — CEFTRIAXONE 100 MILLIGRAM(S): 500 INJECTION, POWDER, FOR SOLUTION INTRAMUSCULAR; INTRAVENOUS at 05:28

## 2023-11-17 RX ADMIN — Medication 102 MILLIGRAM(S): at 14:07

## 2023-11-17 NOTE — PROGRESS NOTE ADULT - ASSESSMENT
27 yo male previously healthy presented today for worsening jaundice and abdominal distended that started 2 weeks ago. Since his jaundice started, the patient began drinking a lot of fluids minimum 4L / day, his abdominal distention kept getting worse and his urine became darker, with oliguria. He reports SOB attributed to his abdomen pushing up on his diaphragm. Last BM yesterday, patient passing gas.  Patient denies any pain, fever, or any other signs of symptoms. No hx of mood disorder.   Social hx: former smoker stopped 2 years ago, has 3-4 PY.   alcohol: drinks every other day 4-5 beers, however last drink was 3 weeks ago.   No drugs., no allergies.   Patient admitted to SDU for severe hyponatremia, large abdominal ascites, and cholestatic liver injury.     #Acute cholestatic liver injury  #New onset large abdominal ascites  #Hepatic steatosis   #Possible diffuse colitis   - On admission, labs remarkable for WBC 16K, hb 10.7 (no BL), INR 5.69 , Na 119, AG 15, total bili 31.1 , aLP 175,   - -ve alcohol level, acetaminophen and salicylate levels.  - CT abd pelv: Diffuse large amount of abdominal and pelvic ascites. Hepatosplenomegaly with right paracolic varices, findings suspicious for portal hypertension. Diffuse colonic wall thickening, possibly related to a diffuse colitis versus this severely edematous state of the patient. Nonspecific dilated loops of small bowel without definite evidence of obstruction.   Partial collapse of the right lower lobe, left lower lobe and right middle lobe.  - RUQ US : 1.  Enlarged fatty infiltrated liver (hepatomegaly with diffuse steatosis).  2.  Sludge within the gallbladder lumen which has a thickened edematous wall. No echogenic stones. Negative sonographic Carter's sign. Thickened edematous gallbladder wall is a nonspecific finding possibly related to volume status in this patient. 3.  Ascites is noted.  - s/p paracentesis on 11/16 with 3.2L obtained  - tramadol 25mg prn for severe pain  - f/u hepatitis panel   - c/w ceftriaxone and flagyl for possible colitis   - hepatology following:  - c/w albumin 1g/kg qD for 2 days (end 11/18)  - vitamin K 10mg qD for 3 days (end 11/19)  - INR and LFT q12  - f/u hepatology labs  - will consider transfer to transplant center based on progression    #severe hyponatremia - likely hypervolemic 2/2 increased free water intake   - patient asymptomatic   - fluid restriction  - f/u urine studies    #Oliguria with +ve UA   - f/u UCx  - c/w ceftriaxone 1g    #Normocytic anemia with no signs of bleed  - f/u anemia workup  - monitor cbc and keep active type and screen                                                                               ----------------------------------------------------  # DVT prophylaxis: SCDs  # GI prophylaxis: Protonix  # Diet: DASH/TLC  # Activity: IAT  # Code status: FULL CODE  # Disposition: SDU                                                                           --------------------------------------------------------    # Handoff:

## 2023-11-17 NOTE — PROGRESS NOTE ADULT - ATTENDING COMMENTS
IMPRESSION:    Hyponatremia in setting of Liver Disease  Portal Hypertension/Hepatomegaly/Right Paracolic Varices/Alcoholic Steatosis   Hepatorenal Syndrome  Possible Diffuse Colitis  Transaminitis  Abdominal/Pelvic Ascites    Plan as outlined above

## 2023-11-17 NOTE — PATIENT PROFILE ADULT - STATED REASON FOR ADMISSION
Extremities has been swelling, thirsty too much, made too much urine. At urgent care, found my liver is not good. Ascites.

## 2023-11-17 NOTE — PROGRESS NOTE ADULT - ATTENDING COMMENTS
28 y o M of [place] origin with AUD admitted with jaundice and abdominal distension.   Has been having abdominal distension for few weeks and jaundice over several weeks. Reports that jaundice improving.   Stopped alcohol about 4 weeks ago. Was drinking 4 beers per day.   Lives with mother and brother. Brother volunteered to be a donor. 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 Works as [employer] .     Feels better. No confusion  Alert oriented x 3  Icteric  Abdomen soft hepatomegaly+  Ext no edema  No asterixis     Severe alcoholic hepatitis with coagulopathy MELD > 30 MDF > 32  Hyponatremia  YANDY    Continue IV Human albumin 25 % 1g/kg  Hold prednisone for now  Fluid restrict to 1000 ml till Na reached 128 then can stop  IV Vitamin K  With YANDY will hold diuretics till creatinine improves to near baseline for 48 hours. Does not want spironolactone, so can use eplirenone and Lasix  Work up for acute hepatitis TSH S cortisol  Echo  Monitor MELD labs  Offered transfer to LT center but opted to be monitored here. Advised if signs of worsening will transfer to [facility].  If encephalopathy or worsening coagulopathy will need urgent transfer .  No dental issues. Can address dental problems post LT if needed.

## 2023-11-17 NOTE — PROGRESS NOTE ADULT - SUBJECTIVE AND OBJECTIVE BOX
SARABIAWILLIAM  28y Male    CHIEF COMPLAINT:    Patient is a 28y old  Male who presents with a chief complaint of cholestasis, abdominal ascites, hyponatremia (17 Nov 2023 09:10)    INTERVAL HPI/OVERNIGHT EVENTS:    Patient seen and examined. No acute events overnight.     ROS: All other systems are negative.    Vital Signs:    T(F): 98 (11-17-23 @ 06:35), Max: 98.2 (11-17-23 @ 01:09)  HR: 104 (11-17-23 @ 06:35) (92 - 104)  BP: 93/53 (11-17-23 @ 06:35) (93/53 - 103/58)  RR: 20 (11-17-23 @ 06:35) (16 - 20)  SpO2: 96% (11-17-23 @ 06:35) (96% - 99%)    PHYSICAL EXAM:    GENERAL:  NAD  SKIN: No rashes or lesions jaundiced  HEENT: Atraumatic. Normocephalic.   NECK: Supple, No JVD.   PULMONARY: CTA B/L. No wheezing.    CVS: Normal S1, S2. Rate and Rhythm are regular.   ABDOMEN/GI: Soft, Nontender, Nondistended  MSK:  No clubbing or cyanosis   NEUROLOGIC: moves all extremities   PSYCH: Alert & oriented x 3     Consultant(s) Notes Reviewed:  [x ] YES  [ ] NO  Care Discussed with Consultants/Other Providers [ x] YES  [ ] NO    LABS:                        9.6    13.87 )-----------( 208      ( 17 Nov 2023 05:00 )             26.2     124<L>  |  88<L>  |  23<H>  ----------------------------<  105<H>  3.4<L>   |  22  |  1.2    Ca    7.9<L>      17 Nov 2023 05:00  Phos  3.6     11-17  Mg     2.6     11-17    TPro  5.0<L>  /  Alb  2.7<L>  /  TBili  30.6<HH>  /  DBili  20.1<H>  /  AST  140<H>  /  ALT  13  /  AlkPhos  150<H>  11-17    PT/INR - ( 17 Nov 2023 05:00 )   PT: 28.00 sec;   INR: 2.43 ratio       PTT - ( 16 Nov 2023 06:59 )  PTT:40.1 sec    Culture - Body Fluid with Gram Stain (collected 16 Nov 2023 12:27)  Source: Abdominal Fl Abdominal Fluid  Gram Stain (17 Nov 2023 01:13):    No polymorphonuclear leukocytes seen    No organisms seen    by cytocentrifuge    Culture - Urine (collected 15 Nov 2023 23:00)  Source: Clean Catch Clean Catch (Midstream)  Final Report (17 Nov 2023 08:28):    <10,000 CFU/mL Normal Urogenital Delmis    RADIOLOGY & ADDITIONAL TESTS:  Imaging or report Personally Reviewed:  [x] YES  [ ] NO  EKG reviewed: [x] YES  [ ] NO    Medications:  Standing  albumin human 25% IVPB 100 milliLiter(s) IV Intermittent once  cefTRIAXone   IVPB 1000 milliGRAM(s) IV Intermittent every 24 hours  influenza   Vaccine 0.5 milliLiter(s) IntraMuscular once  lactulose Syrup 10 Gram(s) Oral three times a day  metroNIDAZOLE  IVPB      metroNIDAZOLE  IVPB 500 milliGRAM(s) IV Intermittent every 8 hours  pantoprazole    Tablet 40 milliGRAM(s) Oral before breakfast  phytonadione  IVPB 10 milliGRAM(s) IV Intermittent daily  potassium chloride  20 mEq/100 mL IVPB 20 milliEquivalent(s) IV Intermittent once    PRN Meds  LORazepam   Injectable 2 milliGRAM(s) IntraMuscular every 2 hours PRN  traMADol 25 milliGRAM(s) Oral every 8 hours PRN

## 2023-11-17 NOTE — PROGRESS NOTE ADULT - SUBJECTIVE AND OBJECTIVE BOX
Hepatology progress note:     Patient is a 28y old  Male who presents with a chief complaint of cholestasis, abdominal ascites, hyponatremia (17 Nov 2023 10:31)       Admitted on: 11-16-23    We are following the patient for etoh hepatitis     s/p para yesterday   no confusion   feels ok     PAST MEDICAL & SURGICAL HISTORY:  No significant past surgical history      MEDICATIONS  (STANDING):  albumin human 25% IVPB 300 milliLiter(s) IV Intermittent <User Schedule>  cefTRIAXone   IVPB 1000 milliGRAM(s) IV Intermittent every 24 hours  chlorhexidine 2% Cloths 1 Application(s) Topical daily  influenza   Vaccine 0.5 milliLiter(s) IntraMuscular once  lactulose Syrup 10 Gram(s) Oral three times a day  metroNIDAZOLE  IVPB      metroNIDAZOLE  IVPB 500 milliGRAM(s) IV Intermittent every 8 hours  pantoprazole    Tablet 40 milliGRAM(s) Oral before breakfast  phytonadione  IVPB 10 milliGRAM(s) IV Intermittent daily    MEDICATIONS  (PRN):  LORazepam   Injectable 2 milliGRAM(s) IntraMuscular every 2 hours PRN Symptom-triggered: 2 point increase in CIWA -Ar score and a total score of 7 or LESS  traMADol 25 milliGRAM(s) Oral every 8 hours PRN Severe Pain (7 - 10)      Allergies  No Known Allergies      Review of Systems:   Cardiovascular:  No Chest Pain, No Palpitations  Respiratory:  No Cough, No Dyspnea  Gastrointestinal:  As described in HPI  Skin:   Jaundice  Neuro:  No Syncope, No Dizziness    Physical Examination:  T(C): 37.6 (11-17-23 @ 12:30), Max: 37.6 (11-17-23 @ 12:30)  HR: 107 (11-17-23 @ 12:30) (92 - 107)  BP: 105/57 (11-17-23 @ 12:30) (93/53 - 105/57)  RR: 20 (11-17-23 @ 12:30) (17 - 20)  SpO2: 96% (11-17-23 @ 12:30) (96% - 99%)      GENERAL: AAOx3, no acute distress.  HEAD:  Atraumatic, Normocephalic  EYES: conjunctiva and sclera clear  NECK: Supple, no JVD or thyromegaly  CHEST/LUNG: Clear to auscultation bilaterally; No wheeze, rhonchi, or rales  HEART: Regular rate and rhythm; normal S1, S2, No murmurs.  ABDOMEN: Soft, nontender, nondistended; Bowel sounds present  NEUROLOGY: No asterixis or tremor.   SKIN: jaundice     Data:                        9.6    13.87 )-----------( 208      ( 17 Nov 2023 05:00 )             26.2     Hgb trend:  9.6  11-17-23 @ 05:00  10.0  11-16-23 @ 06:59  10.7  11-15-23 @ 20:43      11-17    124<L>  |  88<L>  |  23<H>  ----------------------------<  105<H>  3.4<L>   |  22  |  1.2    Ca    7.9<L>      17 Nov 2023 05:00  Phos  3.6     11-17  Mg     2.6     11-17    TPro  5.0<L>  /  Alb  2.7<L>  /  TBili  30.6<HH>  /  DBili  20.1<H>  /  AST  140<H>  /  ALT  13  /  AlkPhos  150<H>  11-17    Liver panel trend:  TBili 30.6   /      /   ALT 13   /   AlkP 150   /   Tptn 5.0   /   Alb 2.7    /   DBili 20.1      11-17  TBili 29.9   /      /   ALT 14   /   AlkP 147   /   Tptn 5.0   /   Alb 2.5    /   DBili 20.3      11-17  TBili 29.2   /      /   ALT 14   /   AlkP 165   /   Tptn 5.1   /   Alb 2.4    /   DBili >17.2      11-16  TBili 29.6   /      /   ALT 15   /   AlkP 162   /   Tptn 5.3   /   Alb 2.5    /   DBili >17.2      11-16  TBili 31.1   /      /   ALT 15   /   AlkP 175   /   Tptn 5.5   /   Alb 2.7    /   DBili >17.2      11-15      PT/INR - ( 17 Nov 2023 05:00 )   PT: 28.00 sec;   INR: 2.43 ratio         PTT - ( 16 Nov 2023 06:59 )  PTT:40.1 sec    Culture - Body Fluid with Gram Stain (collected 16 Nov 2023 12:27)  Source: Abdominal Fl Abdominal Fluid  Gram Stain (17 Nov 2023 01:13):    No polymorphonuclear leukocytes seen    No organisms seen    by cytocentrifuge    Culture - Blood (collected 16 Nov 2023 06:59)  Source: .Blood None  Preliminary Report (17 Nov 2023 14:02):    No growth at 24 hours    Culture - Blood (collected 16 Nov 2023 06:59)  Source: .Blood None  Preliminary Report (17 Nov 2023 14:02):    No growth at 24 hours    Culture - Urine (collected 15 Nov 2023 23:00)  Source: Clean Catch Clean Catch (Midstream)  Final Report (17 Nov 2023 08:28):    <10,000 CFU/mL Normal Urogenital Delmis         Radiology:    US Abdomen Upper Quadrant Right:   ACC: [id number] EXAM:  US ABDOMEN RT UPR QUADRANT   ORDERED BY: GEETHA JOHNSON     PROCEDURE DATE:  11/15/2023          INTERPRETATION:  CLINICAL INFORMATION: Jaundice.    COMPARISON: None available. Correlation with same-day CT    TECHNIQUE: Sonography of the right upper quadrant.    FINDINGS:  Liver: Echogenic liver parenchyma. Enlarged measuring up to 22.2 cm in   length  Bile ducts: Normal caliber. Common bile duct measures 5 mm.  Gallbladder: Thickened edematous gallbladder wall. No echogenic stone   seen. Sludge is noted within the gallbladder lumen. Negative sonographic   Carter's sign  Pancreas: Visualized portions are within normal limits.  Right kidney: Measures 11.9 cm in length. No hydronephrosis.  Ascites: Present.  IVC: Visualized portions are within normal limits.    IMPRESSION:  1.  Enlarged fatty infiltrated liver (hepatomegaly with diffuse   steatosis).  2.  Sludge within the gallbladder lumen which has a thickened edematous   wall. No echogenic stones. Negative sonographic Carter's sign. Thickened   edematous gallbladder wall is a nonspecific finding possibly related to   volume status in this patient.  3.  Ascites is noted, better evaluated on CT.    --- End of Report ---            MASON BAE MD; Attending Radiologist  This document has been electronically signed. Nov 15 2023 10:35PM (11-15-23 @ 21:59)

## 2023-11-17 NOTE — PROGRESS NOTE ADULT - SUBJECTIVE AND OBJECTIVE BOX
Patient is a 28y old  Male who presents with a chief complaint of cholestasis, abdominal ascites, hyponatremia (16 Nov 2023 15:31)        Over Night Events: No overnight events, may need to be transferred to [facility] liver center        ROS:     All ROS are negative except HPI       PHYSICAL EXAM    ICU Vital Signs Last 24 Hrs  T(C): 36.7 (17 Nov 2023 06:35), Max: 36.8 (17 Nov 2023 01:09)  T(F): 98 (17 Nov 2023 06:35), Max: 98.2 (17 Nov 2023 01:09)  HR: 104 (17 Nov 2023 06:35) (92 - 104)  BP: 93/53 (17 Nov 2023 06:35) (93/53 - 103/58)  BP(mean): 71 (17 Nov 2023 01:09) (71 - 74)  ABP: --  ABP(mean): --  RR: 20 (17 Nov 2023 06:35) (16 - 20)  SpO2: 96% (17 Nov 2023 06:35) (96% - 99%)    O2 Parameters below as of 17 Nov 2023 00:00  Patient On (Oxygen Delivery Method): room air      CONSTITUTIONAL:  Well nourished.  NAD    ENT:   Airway patent,   Mouth with normal mucosa.   No thrush    EYES:   Pupils equal,   Round and reactive to light.    CARDIAC:   Normal rate,   Regular rhythm.    No edema      Vascular:  Normal systolic impulse  No Carotid bruits    RESPIRATORY:   No wheezing  Bilateral BS  Normal chest expansion  Not tachypneic,  No use of accessory muscles    GASTROINTESTINAL:  Abdomen soft,   Non-tender,   No guarding,   + BS    MUSCULOSKELETAL:   Range of motion is not limited,  No clubbing, cyanosis    NEUROLOGICAL:   Alert and oriented   No motor  deficits.    SKIN:   Skin normal color for race,   Warm and dry and intact.   No evidence of rash.    PSYCHIATRIC:   Normal mood and affect.   No apparent risk to self or others.    HEMATOLOGICAL:  No cervical  lymphadenopathy.  no inguinal lymphadenopathy        LABS:                            9.6    13.87 )-----------( 208      ( 17 Nov 2023 05:00 )             26.2                                               11-17    124<L>  |  88<L>  |  23<H>  ----------------------------<  105<H>  3.4<L>   |  22  |  1.2    Ca    7.9<L>      17 Nov 2023 05:00  Phos  3.6     11-17  Mg     2.6     11-17    TPro  5.0<L>  /  Alb  2.7<L>  /  TBili  30.6<HH>  /  DBili  20.1<H>  /  AST  140<H>  /  ALT  13  /  AlkPhos  150<H>  11-17      PT/INR - ( 17 Nov 2023 05:00 )   PT: 28.00 sec;   INR: 2.43 ratio         PTT - ( 16 Nov 2023 06:59 )  PTT:40.1 sec                                       Urinalysis Basic - ( 17 Nov 2023 05:00 )    Color: x / Appearance: x / SG: x / pH: x  Gluc: 105 mg/dL / Ketone: x  / Bili: x / Urobili: x   Blood: x / Protein: x / Nitrite: x   Leuk Esterase: x / RBC: x / WBC x   Sq Epi: x / Non Sq Epi: x / Bacteria: x                                                  LIVER FUNCTIONS - ( 17 Nov 2023 05:00 )  Alb: 2.7 g/dL / Pro: 5.0 g/dL / ALK PHOS: 150 U/L / ALT: 13 U/L / AST: 140 U/L / GGT: x                                                  Culture - Body Fluid with Gram Stain (collected 16 Nov 2023 12:27)  Source: Abdominal Fl Abdominal Fluid  Gram Stain (17 Nov 2023 01:13):    No polymorphonuclear leukocytes seen    No organisms seen    by cytocentrifuge    Culture - Urine (collected 15 Nov 2023 23:00)  Source: Clean Catch Clean Catch (Midstream)  Final Report (17 Nov 2023 08:28):    <10,000 CFU/mL Normal Urogenital Delmis                                                                                       MEDICATIONS  (STANDING):  albumin human 25% IVPB 100 milliLiter(s) IV Intermittent once  cefTRIAXone   IVPB 1000 milliGRAM(s) IV Intermittent every 24 hours  influenza   Vaccine 0.5 milliLiter(s) IntraMuscular once  lactulose Syrup 10 Gram(s) Oral daily  metroNIDAZOLE  IVPB      metroNIDAZOLE  IVPB 500 milliGRAM(s) IV Intermittent every 8 hours  pantoprazole    Tablet 40 milliGRAM(s) Oral before breakfast  phytonadione  IVPB 10 milliGRAM(s) IV Intermittent daily  potassium chloride  20 mEq/100 mL IVPB 20 milliEquivalent(s) IV Intermittent once    MEDICATIONS  (PRN):  LORazepam   Injectable 2 milliGRAM(s) IntraMuscular every 2 hours PRN Symptom-triggered: 2 point increase in CIWA -Ar score and a total score of 7 or LESS  traMADol 25 milliGRAM(s) Oral every 8 hours PRN Severe Pain (7 - 10)      New X-rays reviewed:                                                                                  ECHO    CXR interpreted by me:       Patient is a 28y old  Male who presents with a chief complaint of cholestasis, abdominal ascites, hyponatremia (16 Nov 2023 15:31)        Over Night Events: No overnight events, may need to be transferred to [facility] liver center        ROS:     All ROS are negative except HPI       PHYSICAL EXAM    ICU Vital Signs Last 24 Hrs  T(C): 36.7 (17 Nov 2023 06:35), Max: 36.8 (17 Nov 2023 01:09)  T(F): 98 (17 Nov 2023 06:35), Max: 98.2 (17 Nov 2023 01:09)  HR: 104 (17 Nov 2023 06:35) (92 - 104)  BP: 93/53 (17 Nov 2023 06:35) (93/53 - 103/58)  BP(mean): 71 (17 Nov 2023 01:09) (71 - 74)  ABP: --  ABP(mean): --  RR: 20 (17 Nov 2023 06:35) (16 - 20)  SpO2: 96% (17 Nov 2023 06:35) (96% - 99%)    O2 Parameters below as of 17 Nov 2023 00:00  Patient On (Oxygen Delivery Method): room air      CONSTITUTIONAL:  Well nourished.  NAD    CARDIAC:   Normal rate,   Regular rhythm.    No edema    RESPIRATORY:   No wheezing  Bilateral BS  Normal chest expansion  Not tachypneic,  No use of accessory muscles    GASTROINTESTINAL:  Abdomen distended    MUSCULOSKELETAL:   Range of motion is not limited,    NEUROLOGICAL:   Alert and oriented   No motor  deficits.        LABS:                            9.6    13.87 )-----------( 208      ( 17 Nov 2023 05:00 )             26.2                                               11-17    124<L>  |  88<L>  |  23<H>  ----------------------------<  105<H>  3.4<L>   |  22  |  1.2    Ca    7.9<L>      17 Nov 2023 05:00  Phos  3.6     11-17  Mg     2.6     11-17    TPro  5.0<L>  /  Alb  2.7<L>  /  TBili  30.6<HH>  /  DBili  20.1<H>  /  AST  140<H>  /  ALT  13  /  AlkPhos  150<H>  11-17      PT/INR - ( 17 Nov 2023 05:00 )   PT: 28.00 sec;   INR: 2.43 ratio         PTT - ( 16 Nov 2023 06:59 )  PTT:40.1 sec                                       Urinalysis Basic - ( 17 Nov 2023 05:00 )    Color: x / Appearance: x / SG: x / pH: x  Gluc: 105 mg/dL / Ketone: x  / Bili: x / Urobili: x   Blood: x / Protein: x / Nitrite: x   Leuk Esterase: x / RBC: x / WBC x   Sq Epi: x / Non Sq Epi: x / Bacteria: x                                                  LIVER FUNCTIONS - ( 17 Nov 2023 05:00 )  Alb: 2.7 g/dL / Pro: 5.0 g/dL / ALK PHOS: 150 U/L / ALT: 13 U/L / AST: 140 U/L / GGT: x                                                  Culture - Body Fluid with Gram Stain (collected 16 Nov 2023 12:27)  Source: Abdominal Fl Abdominal Fluid  Gram Stain (17 Nov 2023 01:13):    No polymorphonuclear leukocytes seen    No organisms seen    by cytocentrifuge    Culture - Urine (collected 15 Nov 2023 23:00)  Source: Clean Catch Clean Catch (Midstream)  Final Report (17 Nov 2023 08:28):    <10,000 CFU/mL Normal Urogenital Delmis                                                                                       MEDICATIONS  (STANDING):  albumin human 25% IVPB 100 milliLiter(s) IV Intermittent once  cefTRIAXone   IVPB 1000 milliGRAM(s) IV Intermittent every 24 hours  influenza   Vaccine 0.5 milliLiter(s) IntraMuscular once  lactulose Syrup 10 Gram(s) Oral daily  metroNIDAZOLE  IVPB      metroNIDAZOLE  IVPB 500 milliGRAM(s) IV Intermittent every 8 hours  pantoprazole    Tablet 40 milliGRAM(s) Oral before breakfast  phytonadione  IVPB 10 milliGRAM(s) IV Intermittent daily  potassium chloride  20 mEq/100 mL IVPB 20 milliEquivalent(s) IV Intermittent once    MEDICATIONS  (PRN):  LORazepam   Injectable 2 milliGRAM(s) IntraMuscular every 2 hours PRN Symptom-triggered: 2 point increase in CIWA -Ar score and a total score of 7 or LESS  traMADol 25 milliGRAM(s) Oral every 8 hours PRN Severe Pain (7 - 10)      New X-rays reviewed:                                                                                  ECHO    CXR interpreted by me:       Patient is a 28y old  Male who presents with a chief complaint of cholestasis, abdominal ascites, hyponatremia (16 Nov 2023 15:31)        Over Night Events: No overnight events, may need to be transferred to [facility] liver center        ROS:     All ROS are negative except HPI       PHYSICAL EXAM    ICU Vital Signs Last 24 Hrs  T(C): 36.7 (17 Nov 2023 06:35), Max: 36.8 (17 Nov 2023 01:09)  T(F): 98 (17 Nov 2023 06:35), Max: 98.2 (17 Nov 2023 01:09)  HR: 104 (17 Nov 2023 06:35) (92 - 104)  BP: 93/53 (17 Nov 2023 06:35) (93/53 - 103/58)  BP(mean): 71 (17 Nov 2023 01:09) (71 - 74)  ABP: --  ABP(mean): --  RR: 20 (17 Nov 2023 06:35) (16 - 20)  SpO2: 96% (17 Nov 2023 06:35) (96% - 99%)    O2 Parameters below as of 17 Nov 2023 00:00  Patient On (Oxygen Delivery Method): room air      CONSTITUTIONAL:  In NAD    CARDIAC:   Normal rate,   Regular rhythm.    No edema    RESPIRATORY:   No wheezing  Bilateral BS  Normal chest expansion  Not tachypneic,  No use of accessory muscles    GASTROINTESTINAL:  Abdomen distended    MUSCULOSKELETAL:   Range of motion is not limited,    NEUROLOGICAL:   Alert and oriented   No motor  deficits.        LABS:                            9.6    13.87 )-----------( 208      ( 17 Nov 2023 05:00 )             26.2                                               11-17    124<L>  |  88<L>  |  23<H>  ----------------------------<  105<H>  3.4<L>   |  22  |  1.2    Ca    7.9<L>      17 Nov 2023 05:00  Phos  3.6     11-17  Mg     2.6     11-17    TPro  5.0<L>  /  Alb  2.7<L>  /  TBili  30.6<HH>  /  DBili  20.1<H>  /  AST  140<H>  /  ALT  13  /  AlkPhos  150<H>  11-17      PT/INR - ( 17 Nov 2023 05:00 )   PT: 28.00 sec;   INR: 2.43 ratio         PTT - ( 16 Nov 2023 06:59 )  PTT:40.1 sec                                       Urinalysis Basic - ( 17 Nov 2023 05:00 )    Color: x / Appearance: x / SG: x / pH: x  Gluc: 105 mg/dL / Ketone: x  / Bili: x / Urobili: x   Blood: x / Protein: x / Nitrite: x   Leuk Esterase: x / RBC: x / WBC x   Sq Epi: x / Non Sq Epi: x / Bacteria: x                                                  LIVER FUNCTIONS - ( 17 Nov 2023 05:00 )  Alb: 2.7 g/dL / Pro: 5.0 g/dL / ALK PHOS: 150 U/L / ALT: 13 U/L / AST: 140 U/L / GGT: x                                                  Culture - Body Fluid with Gram Stain (collected 16 Nov 2023 12:27)  Source: Abdominal Fl Abdominal Fluid  Gram Stain (17 Nov 2023 01:13):    No polymorphonuclear leukocytes seen    No organisms seen    by cytocentrifuge    Culture - Urine (collected 15 Nov 2023 23:00)  Source: Clean Catch Clean Catch (Midstream)  Final Report (17 Nov 2023 08:28):    <10,000 CFU/mL Normal Urogenital Delmis                                                                                       MEDICATIONS  (STANDING):  albumin human 25% IVPB 100 milliLiter(s) IV Intermittent once  cefTRIAXone   IVPB 1000 milliGRAM(s) IV Intermittent every 24 hours  influenza   Vaccine 0.5 milliLiter(s) IntraMuscular once  lactulose Syrup 10 Gram(s) Oral daily  metroNIDAZOLE  IVPB      metroNIDAZOLE  IVPB 500 milliGRAM(s) IV Intermittent every 8 hours  pantoprazole    Tablet 40 milliGRAM(s) Oral before breakfast  phytonadione  IVPB 10 milliGRAM(s) IV Intermittent daily  potassium chloride  20 mEq/100 mL IVPB 20 milliEquivalent(s) IV Intermittent once    MEDICATIONS  (PRN):  LORazepam   Injectable 2 milliGRAM(s) IntraMuscular every 2 hours PRN Symptom-triggered: 2 point increase in CIWA -Ar score and a total score of 7 or LESS  traMADol 25 milliGRAM(s) Oral every 8 hours PRN Severe Pain (7 - 10)      New X-rays reviewed:                                                                                  ECHO

## 2023-11-17 NOTE — PROGRESS NOTE ADULT - SUBJECTIVE AND OBJECTIVE BOX
24H events:    Patient is a 28y old Male who presents with a chief complaint of cholestasis, abdominal ascites, hyponatremia (17 Nov 2023 09:19)    Primary diagnosis of Jaundice      Day 1: admitted to SDU for severe hyponatremia, large abdominal ascites, and cholestatic liver injury    Today is hospital day 1d. This morning patient was seen and examined at bedside, resting comfortably in bed.    No acute or major events overnight. Hemodynamically stable, tolerating oral diet, voiding appropriately with appropriate bowel movements.     Family communication:  Contact date: 11/17/2023  Relationship to patient: Family at bedside    PAST MEDICAL & SURGICAL HISTORY  No significant past surgical history      ALLERGIES:  No Known Allergies    MEDICATIONS:  STANDING MEDICATIONS  albumin human 25% IVPB 100 milliLiter(s) IV Intermittent once  cefTRIAXone   IVPB 1000 milliGRAM(s) IV Intermittent every 24 hours  influenza   Vaccine 0.5 milliLiter(s) IntraMuscular once  lactulose Syrup 10 Gram(s) Oral three times a day  metroNIDAZOLE  IVPB      metroNIDAZOLE  IVPB 500 milliGRAM(s) IV Intermittent every 8 hours  pantoprazole    Tablet 40 milliGRAM(s) Oral before breakfast  phytonadione  IVPB 10 milliGRAM(s) IV Intermittent daily  potassium chloride  20 mEq/100 mL IVPB 20 milliEquivalent(s) IV Intermittent once    PRN MEDICATIONS  LORazepam   Injectable 2 milliGRAM(s) IntraMuscular every 2 hours PRN  traMADol 25 milliGRAM(s) Oral every 8 hours PRN    VITALS:   T(F): 99  HR: 101  BP: 96/54  RR: 20  SpO2: 96%    PHYSICAL EXAM:  GENERAL: NAD, lying in bed comfortably  HEAD: NCAD  NECK: Supple, no JVD    HEART: Regular rate and rhythm, normal S1/S2, no murmurs  LUNGS: No acute respiratory distress, clear b/l breath sounds  ABDOMEN:  soft, nontender, nondistended  EXTREMITIES: no LE edema  NERVOUS SYSTEM:  A&Ox3  SKIN: jaundiced      LABS:                        9.6    13.87 )-----------( 208      ( 17 Nov 2023 05:00 )             26.2     11-17    124<L>  |  88<L>  |  23<H>  ----------------------------<  105<H>  3.4<L>   |  22  |  1.2    Ca    7.9<L>      17 Nov 2023 05:00  Phos  3.6     11-17  Mg     2.6     11-17    TPro  5.0<L>  /  Alb  2.7<L>  /  TBili  30.6<HH>  /  DBili  20.1<H>  /  AST  140<H>  /  ALT  13  /  AlkPhos  150<H>  11-17    PT/INR - ( 17 Nov 2023 05:00 )   PT: 28.00 sec;   INR: 2.43 ratio         PTT - ( 16 Nov 2023 06:59 )  PTT:40.1 sec  Urinalysis Basic - ( 17 Nov 2023 05:00 )    Color: x / Appearance: x / SG: x / pH: x  Gluc: 105 mg/dL / Ketone: x  / Bili: x / Urobili: x   Blood: x / Protein: x / Nitrite: x   Leuk Esterase: x / RBC: x / WBC x   Sq Epi: x / Non Sq Epi: x / Bacteria: x            Culture - Body Fluid with Gram Stain (collected 16 Nov 2023 12:27)  Source: Abdominal Fl Abdominal Fluid  Gram Stain (17 Nov 2023 01:13):    No polymorphonuclear leukocytes seen    No organisms seen    by cytocentrifuge    Culture - Urine (collected 15 Nov 2023 23:00)  Source: Clean Catch Clean Catch (Midstream)  Final Report (17 Nov 2023 08:28):    <10,000 CFU/mL Normal Urogenital Delmis          RADIOLOGY:    < from: CT Abdomen and Pelvis w/ IV Cont (11.15.23 @ 20:58) >    ACC: [id number] EXAM:  CT ABDOMEN AND PELVIS IC   ORDERED BY: GEETHA JOHNSON     PROCEDURE DATE:  11/15/2023          INTERPRETATION:  CLINICAL STATEMENT: Jaundice    TECHNIQUE: Contiguous axial CT images were obtained from the lower chest   to the pubic symphysis following administration of intravenous contrast.    100 cc administered of Omnipaque 350 (0 cc discarded).  Oral contrast was   not administered.  Reformatted images in the coronal and sagittal planes   were acquired.    COMPARISON CT: None.      FINDINGS:    LOWER CHEST: There is partial collapse of both lung bases and right   middle lobe. There are patchy areas of subsegmental atelectasis..    HEPATOBILIARY: There is fatty infiltration of the liver and the liver   appears enlargedas well. The liver contour is grossly unremarkable.   There is diffuse enhancement of the gallbladder wall.    SPLEEN: Spleen is enlarged measuring at least 18 cm    PANCREAS: Unremarkable..    ADRENAL GLANDS: Unremarkable..    KIDNEYS: Unremarkable..    ABDOMINOPELVIC NODES: Diffuse large volume abdominal pelvic ascites   limits evaluation for lymph nodes...    PELVIC ORGANS: Unremarkable..    PERITONEUM/MESENTERY/BOWEL: There is diffuse colonic wall thickening ,   which may be related to a diffuse colitis versus the edematous state of   the patient. There is no evidence of free air or obstruction. There are   scattered nonspecific dilated loops of small bowel. Follow-up is   recommended..    BONES/SOFT TISSUES: Unremarkable for age...    OTHER: The abdominal aorta is normal in caliber.  Varices are noted in   the right paracolic region...      IMPRESSION:    Diffuse large amount of abdominal and pelvic ascites.    Hepatosplenomegaly with right paracolic varices, findings suspicious for   portal hypertension.    Diffuse colonic wall thickening, possibly related to a diffuse colitis   versus this severely edematous state of the patient.    Nonspecific dilated loops of small bowel without definite evidence of   obstruction. Follow-up is recommended    Partial collapse of the right lower lobe, left lower lobe and right   middle lobe.    --- End of Report ---            MIRNA HOUGH MD; Attending Radiologist  This document has been electronically signed. Nov 15 2023  9:37PM    < end of copied text >    < from: US Abdomen Upper Quadrant Right (11.15.23 @ 21:59) >    ACC: [id number] EXAM:  US ABDOMEN RT UPR QUADRANT   ORDERED BY: GEETHA JOHNSON     PROCEDURE DATE:  11/15/2023          INTERPRETATION:  CLINICAL INFORMATION: Jaundice.    COMPARISON: None available. Correlation with same-day CT    TECHNIQUE: Sonography of the right upper quadrant.    FINDINGS:  Liver: Echogenic liver parenchyma. Enlarged measuring up to 22.2 cm in   length  Bile ducts: Normal caliber. Common bile duct measures 5 mm.  Gallbladder: Thickened edematous gallbladder wall. No echogenic stone   seen. Sludge is noted within the gallbladder lumen. Negative sonographic   Carter's sign  Pancreas: Visualized portions are within normal limits.  Right kidney: Measures 11.9 cm in length. No hydronephrosis.  Ascites: Present.  IVC: Visualized portions are within normal limits.    IMPRESSION:  1.  Enlarged fatty infiltrated liver (hepatomegaly with diffuse   steatosis).  2.  Sludge within the gallbladder lumen which has a thickened edematous   wall. No echogenic stones. Negative sonographic Carter's sign. Thickened   edematous gallbladder wall is a nonspecific finding possibly related to   volume status in this patient.  3.  Ascites is noted, better evaluated on CT.    --- End of Report ---            MASON BAE MD; Attending Radiologist  This document has been electronically signed. Nov 15 2023 10:35PM    < end of copied text >

## 2023-11-17 NOTE — PROGRESS NOTE ADULT - ASSESSMENT
Patient is a 28y Male with no pmhx, he presented to ED on 11/15 for worsening jaundice and abdominal distension x 3 weeks with  dark urine and oliguria x 4 days , reports drinking drinking fluids minimum 4L / day to try and urinate.  Alcohol: Drinks every other day 4-5 beers, however last drink was 3 weeks ago.   No drugs., no allergies.     # YANDY  # hyponatremia   # ETOH hepatitis     - creat noted stable   - sp Albumin   - serum sodium better / U Na noted low and U osm elevated c/w ADH presence   - try NS at 50 cc per hour   - limit free water   - follow BMP tonight and in AM   - appreciate liver team notes     will follow

## 2023-11-17 NOTE — PROGRESS NOTE ADULT - SUBJECTIVE AND OBJECTIVE BOX
Nephrology progress note    THIS IS AN INCOMPLETE NOTE . FULL NOTE TO FOLLOW SHORTLY    Patient is seen and examined, events over the last 24 h noted .    Allergies:  No Known Allergies    Hospital Medications:   MEDICATIONS  (STANDING):  albumin human 25% IVPB 100 milliLiter(s) IV Intermittent once  cefTRIAXone   IVPB 1000 milliGRAM(s) IV Intermittent every 24 hours  influenza   Vaccine 0.5 milliLiter(s) IntraMuscular once  lactulose Syrup 10 Gram(s) Oral three times a day  metroNIDAZOLE  IVPB      metroNIDAZOLE  IVPB 500 milliGRAM(s) IV Intermittent every 8 hours  pantoprazole    Tablet 40 milliGRAM(s) Oral before breakfast  phytonadione  IVPB 10 milliGRAM(s) IV Intermittent daily  potassium chloride  20 mEq/100 mL IVPB 20 milliEquivalent(s) IV Intermittent once        VITALS:  T(F): 98 (11-17-23 @ 06:35), Max: 98.2 (11-17-23 @ 01:09)  HR: 104 (11-17-23 @ 06:35)  BP: 93/53 (11-17-23 @ 06:35)  RR: 20 (11-17-23 @ 06:35)  SpO2: 96% (11-17-23 @ 06:35)  Wt(kg): --        PHYSICAL EXAM:  Constitutional: NAD  HEENT: anicteric sclera, oropharynx clear, MMM  Neck: No JVD  Respiratory: CTAB, no wheezes, rales or rhonchi  Cardiovascular: S1, S2, RRR  Gastrointestinal: BS+, soft, NT/ND  Extremities: No cyanosis or clubbing. No peripheral edema  :  No tavarez.   Skin: No rashes    LABS:  11-17    124<L>  |  88<L>  |  23<H>  ----------------------------<  105<H>  3.4<L>   |  22  |  1.2    Ca    7.9<L>      17 Nov 2023 05:00  Phos  3.6     11-17  Mg     2.6     11-17    TPro  5.0<L>  /  Alb  2.7<L>  /  TBili  30.6<HH>  /  DBili  20.1<H>  /  AST  140<H>  /  ALT  13  /  AlkPhos  150<H>  11-17                          9.6    13.87 )-----------( 208      ( 17 Nov 2023 05:00 )             26.2       Urine Studies:  Urinalysis Basic - ( 17 Nov 2023 05:00 )    Color:  / Appearance:  / SG:  / pH:   Gluc: 105 mg/dL / Ketone:   / Bili:  / Urobili:    Blood:  / Protein:  / Nitrite:    Leuk Esterase:  / RBC:  / WBC    Sq Epi:  / Non Sq Epi:  / Bacteria:       Sodium, Random Urine: 20.0 mmoL/L (11-16 @ 17:58)  Creatinine, Random Urine: 127 mg/dL (11-16 @ 17:58)  Protein/Creatinine Ratio Calculation: 0.6 Ratio (11-16 @ 17:58)  Potassium, Random Urine: 40 mmol/L (11-16 @ 17:58)  Osmolality, Random Urine: 616 mos/kg (11-16 @ 07:54)      Iron 64, TIBC --, %sat --      [11-16-23 @ 06:59]  Ferritin 1312      [11-16-23 @ 06:59]  TSH 1.96      [11-16-23 @ 11:26]    HBsAb Nonreact      [11-16-23 @ 06:59]  HBsAg Nonreact      [11-16-23 @ 06:59]  HCV 0.09, Nonreact      [11-16-23 @ 06:59]  HIV Nonreact      [11-16-23 @ 06:59]        RADIOLOGY & ADDITIONAL STUDIES:   Nephrology progress note    Patient is seen and examined, events over the last 24 h noted .  Lying in bed   jaundiced     Allergies:  No Known Allergies    Hospital Medications:   MEDICATIONS  (STANDING):  albumin human 25% IVPB 100 milliLiter(s) IV Intermittent once  cefTRIAXone   IVPB 1000 milliGRAM(s) IV Intermittent every 24 hours  influenza   Vaccine 0.5 milliLiter(s) IntraMuscular once  lactulose Syrup 10 Gram(s) Oral three times a day  metroNIDAZOLE  IVPB 500 milliGRAM(s) IV Intermittent every 8 hours  pantoprazole    Tablet 40 milliGRAM(s) Oral before breakfast  phytonadione  IVPB 10 milliGRAM(s) IV Intermittent daily  potassium chloride  20 mEq/100 mL IVPB 20 milliEquivalent(s) IV Intermittent once        VITALS:  T(F): 98 (11-17-23 @ 06:35), Max: 98.2 (11-17-23 @ 01:09)  HR: 104 (11-17-23 @ 06:35)  BP: 93/53 (11-17-23 @ 06:35)  RR: 20 (11-17-23 @ 06:35)  SpO2: 96% (11-17-23 @ 06:35)          PHYSICAL EXAM:  Constitutional: NAD  Respiratory: CTAB,  Cardiovascular: S1, S2, RRR  Gastrointestinal: BS+, soft, NT/ND  Extremities: No cyanosis or clubbing. No peripheral edema  :  No tavarez.   Skin: No rashes    LABS:  11-17    124<L>  |  88<L>  |  23<H>  ----------------------------<  105<H>  3.4<L>   |  22  |  1.2      Creatinine Trend: 1.2<--, 1.2<--, 1.0<--, 1.0<--, 1.0<--, 1.2<--  SODIUM TREND:  Sodium 124 [11-17 @ 05:00]  Sodium 123 [11-17 @ 00:23]  Sodium 125 [11-16 @ 22:03]  Sodium 124 [11-16 @ 16:34]  Sodium 122 [11-16 @ 11:26]  Sodium 122 [11-16 @ 06:59]  Sodium 119 [11-15 @ 20:43]    Ca    7.9<L>      17 Nov 2023 05:00  Phos  3.6     11-17  Mg     2.6     11-17    TPro  5.0<L>  /  Alb  2.7<L>  /  TBili  30.6<HH>  /  DBili  20.1<H>  /  AST  140<H>  /  ALT  13  /  AlkPhos  150<H>  11-17                          9.6    13.87 )-----------( 208      ( 17 Nov 2023 05:00 )             26.2       Urine Studies:  Urinalysis Basic - ( 17 Nov 2023 05:00 )    Color:  / Appearance:  / SG:  / pH:   Gluc: 105 mg/dL / Ketone:   / Bili:  / Urobili:    Blood:  / Protein:  / Nitrite:    Leuk Esterase:  / RBC:  / WBC    Sq Epi:  / Non Sq Epi:  / Bacteria:       Sodium, Random Urine: 20.0 mmoL/L (11-16 @ 17:58)  Creatinine, Random Urine: 127 mg/dL (11-16 @ 17:58)  Protein/Creatinine Ratio Calculation: 0.6 Ratio (11-16 @ 17:58)  Potassium, Random Urine: 40 mmol/L (11-16 @ 17:58)  Osmolality, Random Urine: 616 mos/kg (11-16 @ 07:54)      Iron 64, TIBC --, %sat --      [11-16-23 @ 06:59]  Ferritin 1312      [11-16-23 @ 06:59]  TSH 1.96      [11-16-23 @ 11:26]    HBsAb Nonreact      [11-16-23 @ 06:59]  HBsAg Nonreact      [11-16-23 @ 06:59]  HCV 0.09, Nonreact      [11-16-23 @ 06:59]  HIV Nonreact      [11-16-23 @ 06:59]        RADIOLOGY & ADDITIONAL STUDIES:

## 2023-11-17 NOTE — PROGRESS NOTE ADULT - ASSESSMENT
29 yo male previously healthy presented today for worsening jaundice and abdominal distention   that started 2 weeks ago. patient was admitted for etoh hepatitis.    # Etoh hepatitis MDF > 32  # ascites   # R/O etoh cirrhosis. varices in CT scan   # YANDY   # Coagulopathy   No encephalopathy   MELD3: 32  MELD-Na: 34    - CT abd pelv: Diffuse large amount of abdominal and pelvic ascites. Hepatosplenomegaly with right paracolic varices, findings suspicious for portal hypertension.   - RUQ US : 1.  Enlarged fatty infiltrated liver (hepatomegaly with diffuse steatosis). Ascites is noted.  LIVER FUNCTIONS - ( 16 Nov 2023 11:26 )  Alb: 2.4 g/dL / Pro: 5.1 g/dL / ALK PHOS: 165 U/L / ALT: 14 U/L / AST: 141 U/L / GGT: x           PLAN   Albumin 1 g/kg QD for two days   Vitamin K 10 mg QD for three days   INR and LFT Q12   Please send Hepatitis A IgM, Hepatitis B core IgM, core Ab total, surface Ag, surface Ab, HCV antibody, HCV RNA, Anti HEV  Please send Iron studies and ceruloplasmin  Please obtain Quantiferon level  Please send GIOVANY, AMA, anti-Smooth Muscle Ab type 1, Anti liver-kidney microsomal Ab, Anti-soluble liver Ag, immunoglobulin panel  Please send CMV PCR, EBV PCR, HSV IgM  Please avoid hepatotoxic medications  Will start Eplerenone  and Lasix before discharge.   Dietitian consult  Lives with mother and brother. Works at [email or web address].   Please notify hepatology with any change on mental status  Hold prednisone for now  Offered transfer to LT center but opted to be monitored here.  If encephalopathy or worsening coagulopathy will need urgent transfer .  Lives with mother and brother. Brother volunteered to be a donor. 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

## 2023-11-17 NOTE — PROGRESS NOTE ADULT - ASSESSMENT
27 yo male previously healthy presented today for worsening jaundice and abdominal distended that started 2 weeks ago. Since his jaundice started, the patient began drinking a lot of fluids minimum 4L / day, his abdominal distention kept getting worse and his urine became darker, with oliguria. He reports SOB attributed to his abdomen pushing up on his diaphragm. Last BM yesterday, patient passing gas.  Patient denies any pain, fever, or any other signs of symptoms. No hx of mood disorder.   Social hx: former smoker stopped 2 years ago, has 3-4 PY.   alcohol: drinks every other day 4-5 beers, however last drink was 3 weeks ago.     1. Acute liver failure unclear etiology but might be related to alcohol complicated by ascites   * pt drink 4-5 beers every day and binge drink during weekend pad[place] during football season   - Admit to medicine                   - INR:   2.69            - Hepatitis panel:pending            -Alcohol level: negative         - Tylenol : negative   - [facility] protocol   - Glucose check    -Ramiro disease lab AP/Bilirubin is >4 not in favor of ramiro, AST/ALT high which is in favor of alcohol abuse and ramiro   - Lactulose daily as prophylaxis for hepatic encephalopathy   - Trial of vitamin K for 3 days   - Paracentesis: done no infection noted   - Cont rocephin and flagyl but doubt colitis   - No prednisone as per hepatology for now as it has worst outcome if pt needs transplant.   - CT abd pelv:   a) Diffuse large amount of abdominal and pelvic ascites. Hepatosplenomegaly with right paracolic varices, findings suspicious for portal hypertension. Diffuse colonic wall thickening, possibly related to a diffuse colitis versus this severely edematous state of the patient. Nonspecific dilated loops of small bowel without definite evidence of obstruction.   b) Partial collapse of the right lower lobe, left lower lobe and right middle lobe.  - RUQ US :   a)  Enlarged fatty infiltrated liver (hepatomegaly with diffuse steatosis).  b) Sludge within the gallbladder lumen which has a thickened edematous wall. No echogenic stones. Negative sonographic Carter's sign. Thickened edematous gallbladder wall is a nonspecific finding possibly related to volume status in this patient.   c)  Ascites is noted.  - Critical care consult appreciated   - Hepatology consult:pending  - GI consult:pending     2. severe Subacute hyponatremia - likely hypervolemic 2/2 increased free water intake slowly correcting   *  patient asymptomatic   - BMP at least 3 times a day with a target of 6-8 in 24 hours (likely I will go with 6)   - fluid restriction  - Urine studies   - Nephrology consult:pending     3. oliguria with + UA --> started ceftriaxone , f/u urine cx     4. normocytic anemia with no signs of bleed    #MISC  - DVT PPx: low risk with elevated INR. SCD for now   - GI PPx: started protonix 40mg po qd   - Diet: npo for now until GI eval  - Activity: IAT  - Labs: ordered  - Code: Full code  - Dispo: SDU    Monitoring patient today. if labs getting worse (padma INR), might need transfer to a transplant center.  29 yo male previously healthy presented today for worsening jaundice and abdominal distention that started 2 weeks ago. Since his jaundice started, the patient began drinking a lot of fluids minimum 4L / day, his abdominal distention kept getting worse and his urine became darker, with oliguria. He reports SOB attributed to his abdomen pushing up on his diaphragm.      Acute liver failure unclear etiology but suspected ETOH use  Elevated INR  used to be a daily 4 beer/day drinker with episodes of binge drinking, stopped 1 month ago  - CT abd pelvis:   a) Diffuse large amount of abdominal and pelvic ascites. Hepatosplenomegaly with right paracolic varices, findings suspicious for portal hypertension. Diffuse colonic wall thickening, possibly related to a diffuse colitis versus this severely edematous state of the patient. Nonspecific dilated loops of small bowel without definite evidence of obstruction.   b) Partial collapse of the right lower lobe, left lower lobe and right middle lobe.  - RUQ US :   a)  Enlarged fatty infiltrated liver (hepatomegaly with diffuse steatosis).  b) Sludge within the gallbladder lumen which has a thickened edematous wall. No echogenic stones. Negative sonographic Carter's sign. Thickened edematous gallbladder wall is a nonspecific finding possibly related to volume status in this patient.   c)  Ascites is noted.  - s/p paracentesis 11/16 with 3.2 L removed, no evidence of SBP  - hepatology following, pending labs requested  - currently on Albumin, and Vit K  - f/u  hepatology, patient did not wish to be transferred to LT center    Severe Subacute hyponatremia - likely hypervolemic 2/2 increased free water intake slowly correcting   YANDY  patient asymptomatic   - BMP monitoring Q8H  - fluid restriction  - Urine studies/ Uric Acid  - Nephrology following      Normocytic anemia with no signs of bleed, monitor    Overall prognosis is guarded, if worsening mental status or coagulopathy, will need urgent transfer to tplant center, hepatology on board       Patient seen at bedside, total time spent to evaluate and treat the patient's acute illness and chronic medical conditions as well as time spent reviewing labs, radiology, discussing medical plan with covering medical team was more than 55 minutes with >50% of time spent face to face with patient, discussing with patient/family as well as coordination of care

## 2023-11-17 NOTE — PROGRESS NOTE ADULT - ASSESSMENT
IMPRESSION:    Severe Hyponatremia in setting of Liver Disease  Portal Hypertension/Hepatomegaly/Right Paracolic Varices/Alcoholic Steatosis   Hepatorenal Syndrome  Possible Diffuse Colitis  Transaminitis  Abdominal/Pelvic Ascites  Bilateral lower lobe partial atelectasis    PLAN:    CNS: mental status at baseline, thiamine, folate, CIWA monitoring, check ammonia level    HEENT: Oral care    PULMONARY:  HOB @ 45 degrees.  Aspiration precautions. on room air, incentive spirometry    CARDIOVASCULAR: target MAP, avoid beta-blockers, consider albumin, midodrine, octreotide     GI: GI prophylaxis.  s/p paracentesis 3.2 L, Hepatology recs appreciated, albumin challenge    RENAL:  Follow up lytes.  Correct as needed, check urine lytes, urine osmolality    INFECTIOUS DISEASE: Follow up cultures, continue Ceftriaxone and Flagyl, check procalcitonin, hepatitis panel    HEMATOLOGICAL:  DVT prophylaxis. maintain active type and screen, transfuse if Hgb<7.0    ENDOCRINE:  Follow up FS.  Insulin protocol if needed.    MUSCULOSKELETAL: activity as tolerated     SDU monitoring IMPRESSION:    Severe Hyponatremia in setting of Liver Disease  Portal Hypertension/Hepatomegaly/Right Paracolic Varices/Alcoholic Steatosis   Hepatorenal Syndrome  Possible Diffuse Colitis  Transaminitis  Abdominal/Pelvic Ascites  Bilateral lower lobe partial atelectasis    PLAN:    CNS: mental status at baseline, thiamine, folate, CIWA monitoring, check ammonia level    HEENT: Oral care    PULMONARY:  HOB @ 45 degrees.  Aspiration precautions. on room air, incentive spirometry    CARDIOVASCULAR: target MAP, avoid beta-blockers, consider albumin, midodrine, octreotide     GI: GI prophylaxis.  s/p paracentesis 3.2 L, Hepatology recs appreciated, albumin challenge, c/w bowel regimen     RENAL:  Follow up lytes.  Correct as needed, check urine lytes, urine osmolality    INFECTIOUS DISEASE: Follow up cultures, continue Ceftriaxone and Flagyl, check procalcitonin, hepatitis panel    HEMATOLOGICAL:  DVT prophylaxis. maintain active type and screen, transfuse if Hgb<7.0    ENDOCRINE:  Follow up FS.  Insulin protocol if needed.    MUSCULOSKELETAL: activity as tolerated     SDU monitoring IMPRESSION:    Hyponatremia in setting of Liver Disease  Portal Hypertension/Hepatomegaly/Right Paracolic Varices/Alcoholic Steatosis   Hepatorenal Syndrome  Possible Diffuse Colitis  Transaminitis  Abdominal/Pelvic Ascites      PLAN:    CNS: mental status at baseline, thiamine, folate, CIWA monitoring, check ammonia level    HEENT: Oral care    PULMONARY:  HOB @ 45 degrees.  Aspiration precautions. on room air, incentive spirometry    CARDIOVASCULAR: target MAP, avoid beta-blockers,     GI: GI prophylaxis.  s/p paracentesis 3.2 L, Hepatology recs appreciated, albumin challenge, c/w bowel regimen     RENAL:  Follow up lytes.  Correct as needed, check urine lytes, urine osmolality    INFECTIOUS DISEASE: Follow up cultures, continue Ceftriaxone and Flagyl, check procalcitonin, hepatitis panel    HEMATOLOGICAL:  DVT prophylaxis. maintain active type and screen, transfuse if Hgb<7.0    ENDOCRINE:  Follow up FS.  Insulin protocol if needed.    MUSCULOSKELETAL: activity as tolerated     SDU monitoring

## 2023-11-18 LAB
ALBUMIN SERPL ELPH-MCNC: 3.1 G/DL — LOW (ref 3.5–5.2)
ALBUMIN SERPL ELPH-MCNC: 3.1 G/DL — LOW (ref 3.5–5.2)
ALBUMIN SERPL ELPH-MCNC: 3.7 G/DL — SIGNIFICANT CHANGE UP (ref 3.5–5.2)
ALBUMIN SERPL ELPH-MCNC: 3.7 G/DL — SIGNIFICANT CHANGE UP (ref 3.5–5.2)
ALP SERPL-CCNC: 107 U/L — SIGNIFICANT CHANGE UP (ref 30–115)
ALP SERPL-CCNC: 107 U/L — SIGNIFICANT CHANGE UP (ref 30–115)
ALP SERPL-CCNC: 123 U/L — HIGH (ref 30–115)
ALP SERPL-CCNC: 123 U/L — HIGH (ref 30–115)
ALT FLD-CCNC: 10 U/L — SIGNIFICANT CHANGE UP (ref 0–41)
ALT FLD-CCNC: 10 U/L — SIGNIFICANT CHANGE UP (ref 0–41)
ALT FLD-CCNC: 12 U/L — SIGNIFICANT CHANGE UP (ref 0–41)
ALT FLD-CCNC: 12 U/L — SIGNIFICANT CHANGE UP (ref 0–41)
AMMONIA BLD-MCNC: 50 UMOL/L — SIGNIFICANT CHANGE UP (ref 11–55)
AMMONIA BLD-MCNC: 50 UMOL/L — SIGNIFICANT CHANGE UP (ref 11–55)
ANION GAP SERPL CALC-SCNC: 14 MMOL/L — SIGNIFICANT CHANGE UP (ref 7–14)
ANION GAP SERPL CALC-SCNC: 14 MMOL/L — SIGNIFICANT CHANGE UP (ref 7–14)
ANION GAP SERPL CALC-SCNC: 16 MMOL/L — HIGH (ref 7–14)
ANION GAP SERPL CALC-SCNC: 16 MMOL/L — HIGH (ref 7–14)
APTT BLD: 39 SEC — SIGNIFICANT CHANGE UP (ref 27–39.2)
APTT BLD: 39 SEC — SIGNIFICANT CHANGE UP (ref 27–39.2)
APTT BLD: 41.3 SEC — HIGH (ref 27–39.2)
APTT BLD: 41.3 SEC — HIGH (ref 27–39.2)
AST SERPL-CCNC: 107 U/L — HIGH (ref 0–41)
AST SERPL-CCNC: 107 U/L — HIGH (ref 0–41)
AST SERPL-CCNC: 111 U/L — HIGH (ref 0–41)
AST SERPL-CCNC: 111 U/L — HIGH (ref 0–41)
BASOPHILS # BLD AUTO: 0.07 K/UL — SIGNIFICANT CHANGE UP (ref 0–0.2)
BASOPHILS # BLD AUTO: 0.07 K/UL — SIGNIFICANT CHANGE UP (ref 0–0.2)
BASOPHILS NFR BLD AUTO: 0.5 % — SIGNIFICANT CHANGE UP (ref 0–1)
BASOPHILS NFR BLD AUTO: 0.5 % — SIGNIFICANT CHANGE UP (ref 0–1)
BILIRUB DIRECT SERPL-MCNC: 25.6 MG/DL — HIGH (ref 0–0.3)
BILIRUB DIRECT SERPL-MCNC: 25.6 MG/DL — HIGH (ref 0–0.3)
BILIRUB INDIRECT FLD-MCNC: 7.4 MG/DL — HIGH (ref 0.2–1.2)
BILIRUB INDIRECT FLD-MCNC: 7.4 MG/DL — HIGH (ref 0.2–1.2)
BILIRUB SERPL-MCNC: 31.5 MG/DL — CRITICAL HIGH (ref 0.2–1.2)
BILIRUB SERPL-MCNC: 31.5 MG/DL — CRITICAL HIGH (ref 0.2–1.2)
BILIRUB SERPL-MCNC: 33 MG/DL — CRITICAL HIGH (ref 0.2–1.2)
BILIRUB SERPL-MCNC: 33 MG/DL — CRITICAL HIGH (ref 0.2–1.2)
BUN SERPL-MCNC: 24 MG/DL — HIGH (ref 10–20)
BUN SERPL-MCNC: 24 MG/DL — HIGH (ref 10–20)
BUN SERPL-MCNC: 26 MG/DL — HIGH (ref 10–20)
BUN SERPL-MCNC: 26 MG/DL — HIGH (ref 10–20)
CALCIUM SERPL-MCNC: 7.8 MG/DL — LOW (ref 8.4–10.5)
CALCIUM SERPL-MCNC: 7.8 MG/DL — LOW (ref 8.4–10.5)
CALCIUM SERPL-MCNC: 8 MG/DL — LOW (ref 8.4–10.4)
CALCIUM SERPL-MCNC: 8 MG/DL — LOW (ref 8.4–10.4)
CERULOPLASMIN SERPL-MCNC: 19 MG/DL — SIGNIFICANT CHANGE UP (ref 15–30)
CERULOPLASMIN SERPL-MCNC: 19 MG/DL — SIGNIFICANT CHANGE UP (ref 15–30)
CHLORIDE SERPL-SCNC: 89 MMOL/L — LOW (ref 98–110)
CHLORIDE SERPL-SCNC: 89 MMOL/L — LOW (ref 98–110)
CHLORIDE SERPL-SCNC: 95 MMOL/L — LOW (ref 98–110)
CHLORIDE SERPL-SCNC: 95 MMOL/L — LOW (ref 98–110)
CO2 SERPL-SCNC: 19 MMOL/L — SIGNIFICANT CHANGE UP (ref 17–32)
CO2 SERPL-SCNC: 19 MMOL/L — SIGNIFICANT CHANGE UP (ref 17–32)
CO2 SERPL-SCNC: 22 MMOL/L — SIGNIFICANT CHANGE UP (ref 17–32)
CO2 SERPL-SCNC: 22 MMOL/L — SIGNIFICANT CHANGE UP (ref 17–32)
CREAT SERPL-MCNC: 1.2 MG/DL — SIGNIFICANT CHANGE UP (ref 0.7–1.5)
CREAT SERPL-MCNC: 1.2 MG/DL — SIGNIFICANT CHANGE UP (ref 0.7–1.5)
CREAT SERPL-MCNC: 1.3 MG/DL — SIGNIFICANT CHANGE UP (ref 0.7–1.5)
CREAT SERPL-MCNC: 1.3 MG/DL — SIGNIFICANT CHANGE UP (ref 0.7–1.5)
EGFR: 76 ML/MIN/1.73M2 — SIGNIFICANT CHANGE UP
EGFR: 76 ML/MIN/1.73M2 — SIGNIFICANT CHANGE UP
EGFR: 83 ML/MIN/1.73M2 — SIGNIFICANT CHANGE UP
EGFR: 83 ML/MIN/1.73M2 — SIGNIFICANT CHANGE UP
EOSINOPHIL # BLD AUTO: 0.18 K/UL — SIGNIFICANT CHANGE UP (ref 0–0.7)
EOSINOPHIL # BLD AUTO: 0.18 K/UL — SIGNIFICANT CHANGE UP (ref 0–0.7)
EOSINOPHIL NFR BLD AUTO: 1.4 % — SIGNIFICANT CHANGE UP (ref 0–8)
EOSINOPHIL NFR BLD AUTO: 1.4 % — SIGNIFICANT CHANGE UP (ref 0–8)
FERRITIN SERPL-MCNC: 1110 NG/ML — HIGH (ref 30–400)
FERRITIN SERPL-MCNC: 1110 NG/ML — HIGH (ref 30–400)
FOLATE SERPL-MCNC: 2.7 NG/ML — LOW
FOLATE SERPL-MCNC: 2.7 NG/ML — LOW
GLUCOSE SERPL-MCNC: 111 MG/DL — HIGH (ref 70–99)
GLUCOSE SERPL-MCNC: 111 MG/DL — HIGH (ref 70–99)
GLUCOSE SERPL-MCNC: 143 MG/DL — HIGH (ref 70–99)
GLUCOSE SERPL-MCNC: 143 MG/DL — HIGH (ref 70–99)
HAV IGM SER-ACNC: SIGNIFICANT CHANGE UP
HAV IGM SER-ACNC: SIGNIFICANT CHANGE UP
HBV CORE IGM SER-ACNC: SIGNIFICANT CHANGE UP
HBV CORE IGM SER-ACNC: SIGNIFICANT CHANGE UP
HBV SURFACE AG SER-ACNC: SIGNIFICANT CHANGE UP
HBV SURFACE AG SER-ACNC: SIGNIFICANT CHANGE UP
HCT VFR BLD CALC: 24.5 % — LOW (ref 42–52)
HCT VFR BLD CALC: 24.5 % — LOW (ref 42–52)
HCV AB S/CO SERPL IA: 0.06 S/CO — SIGNIFICANT CHANGE UP (ref 0–0.99)
HCV AB S/CO SERPL IA: 0.06 S/CO — SIGNIFICANT CHANGE UP (ref 0–0.99)
HCV AB SERPL-IMP: SIGNIFICANT CHANGE UP
HCV AB SERPL-IMP: SIGNIFICANT CHANGE UP
HGB BLD-MCNC: 8.7 G/DL — LOW (ref 14–18)
HGB BLD-MCNC: 8.7 G/DL — LOW (ref 14–18)
IMM GRANULOCYTES NFR BLD AUTO: 1.1 % — HIGH (ref 0.1–0.3)
IMM GRANULOCYTES NFR BLD AUTO: 1.1 % — HIGH (ref 0.1–0.3)
INR BLD: 2.1 RATIO — HIGH (ref 0.65–1.3)
INR BLD: 2.1 RATIO — HIGH (ref 0.65–1.3)
INR BLD: 2.28 RATIO — HIGH (ref 0.65–1.3)
INR BLD: 2.28 RATIO — HIGH (ref 0.65–1.3)
IRON SATN MFR SERPL: 88 UG/DL — SIGNIFICANT CHANGE UP (ref 35–150)
IRON SATN MFR SERPL: 88 UG/DL — SIGNIFICANT CHANGE UP (ref 35–150)
LYMPHOCYTES # BLD AUTO: 1.65 K/UL — SIGNIFICANT CHANGE UP (ref 1.2–3.4)
LYMPHOCYTES # BLD AUTO: 1.65 K/UL — SIGNIFICANT CHANGE UP (ref 1.2–3.4)
LYMPHOCYTES # BLD AUTO: 12.4 % — LOW (ref 20.5–51.1)
LYMPHOCYTES # BLD AUTO: 12.4 % — LOW (ref 20.5–51.1)
MAGNESIUM SERPL-MCNC: 2.6 MG/DL — HIGH (ref 1.8–2.4)
MAGNESIUM SERPL-MCNC: 2.6 MG/DL — HIGH (ref 1.8–2.4)
MCHC RBC-ENTMCNC: 35.2 PG — HIGH (ref 27–31)
MCHC RBC-ENTMCNC: 35.2 PG — HIGH (ref 27–31)
MCHC RBC-ENTMCNC: 35.5 G/DL — SIGNIFICANT CHANGE UP (ref 32–37)
MCHC RBC-ENTMCNC: 35.5 G/DL — SIGNIFICANT CHANGE UP (ref 32–37)
MCV RBC AUTO: 99.2 FL — HIGH (ref 80–94)
MCV RBC AUTO: 99.2 FL — HIGH (ref 80–94)
MONOCYTES # BLD AUTO: 1.02 K/UL — HIGH (ref 0.1–0.6)
MONOCYTES # BLD AUTO: 1.02 K/UL — HIGH (ref 0.1–0.6)
MONOCYTES NFR BLD AUTO: 7.7 % — SIGNIFICANT CHANGE UP (ref 1.7–9.3)
MONOCYTES NFR BLD AUTO: 7.7 % — SIGNIFICANT CHANGE UP (ref 1.7–9.3)
NEUTROPHILS # BLD AUTO: 10.21 K/UL — HIGH (ref 1.4–6.5)
NEUTROPHILS # BLD AUTO: 10.21 K/UL — HIGH (ref 1.4–6.5)
NEUTROPHILS NFR BLD AUTO: 76.9 % — HIGH (ref 42.2–75.2)
NEUTROPHILS NFR BLD AUTO: 76.9 % — HIGH (ref 42.2–75.2)
NRBC # BLD: 0 /100 WBCS — SIGNIFICANT CHANGE UP (ref 0–0)
NRBC # BLD: 0 /100 WBCS — SIGNIFICANT CHANGE UP (ref 0–0)
PLATELET # BLD AUTO: 187 K/UL — SIGNIFICANT CHANGE UP (ref 130–400)
PLATELET # BLD AUTO: 187 K/UL — SIGNIFICANT CHANGE UP (ref 130–400)
PMV BLD: 10.1 FL — SIGNIFICANT CHANGE UP (ref 7.4–10.4)
PMV BLD: 10.1 FL — SIGNIFICANT CHANGE UP (ref 7.4–10.4)
POTASSIUM SERPL-MCNC: 2.9 MMOL/L — LOW (ref 3.5–5)
POTASSIUM SERPL-MCNC: 2.9 MMOL/L — LOW (ref 3.5–5)
POTASSIUM SERPL-MCNC: 3.9 MMOL/L — SIGNIFICANT CHANGE UP (ref 3.5–5)
POTASSIUM SERPL-MCNC: 3.9 MMOL/L — SIGNIFICANT CHANGE UP (ref 3.5–5)
POTASSIUM SERPL-SCNC: 2.9 MMOL/L — LOW (ref 3.5–5)
POTASSIUM SERPL-SCNC: 2.9 MMOL/L — LOW (ref 3.5–5)
POTASSIUM SERPL-SCNC: 3.9 MMOL/L — SIGNIFICANT CHANGE UP (ref 3.5–5)
POTASSIUM SERPL-SCNC: 3.9 MMOL/L — SIGNIFICANT CHANGE UP (ref 3.5–5)
PROT SERPL-MCNC: 4.9 G/DL — LOW (ref 6–8)
PROT SERPL-MCNC: 4.9 G/DL — LOW (ref 6–8)
PROT SERPL-MCNC: 5.2 G/DL — LOW (ref 6–8)
PROT SERPL-MCNC: 5.2 G/DL — LOW (ref 6–8)
PROTHROM AB SERPL-ACNC: 24.6 SEC — HIGH (ref 9.95–12.87)
PROTHROM AB SERPL-ACNC: 24.6 SEC — HIGH (ref 9.95–12.87)
PROTHROM AB SERPL-ACNC: 26.2 SEC — HIGH (ref 9.95–12.87)
PROTHROM AB SERPL-ACNC: 26.2 SEC — HIGH (ref 9.95–12.87)
RBC # BLD: 2.47 M/UL — LOW (ref 4.7–6.1)
RBC # BLD: 2.47 M/UL — LOW (ref 4.7–6.1)
RBC # FLD: 18.5 % — HIGH (ref 11.5–14.5)
RBC # FLD: 18.5 % — HIGH (ref 11.5–14.5)
SODIUM SERPL-SCNC: 125 MMOL/L — LOW (ref 135–146)
SODIUM SERPL-SCNC: 125 MMOL/L — LOW (ref 135–146)
SODIUM SERPL-SCNC: 130 MMOL/L — LOW (ref 135–146)
SODIUM SERPL-SCNC: 130 MMOL/L — LOW (ref 135–146)
TIBC SERPL-MCNC: <105 UG/DL — LOW (ref 220–430)
TIBC SERPL-MCNC: <105 UG/DL — LOW (ref 220–430)
TRANSFERRIN SERPL-MCNC: 58 MG/DL — LOW (ref 200–360)
TRANSFERRIN SERPL-MCNC: 58 MG/DL — LOW (ref 200–360)
UIBC SERPL-MCNC: <17 UG/DL — LOW (ref 110–370)
UIBC SERPL-MCNC: <17 UG/DL — LOW (ref 110–370)
VIT B12 SERPL-MCNC: >2000 PG/ML — HIGH (ref 232–1245)
VIT B12 SERPL-MCNC: >2000 PG/ML — HIGH (ref 232–1245)
WBC # BLD: 13.28 K/UL — HIGH (ref 4.8–10.8)
WBC # BLD: 13.28 K/UL — HIGH (ref 4.8–10.8)
WBC # FLD AUTO: 13.28 K/UL — HIGH (ref 4.8–10.8)
WBC # FLD AUTO: 13.28 K/UL — HIGH (ref 4.8–10.8)

## 2023-11-18 PROCEDURE — 99233 SBSQ HOSP IP/OBS HIGH 50: CPT

## 2023-11-18 RX ORDER — LACTULOSE 10 G/15ML
20 SOLUTION ORAL THREE TIMES A DAY
Refills: 0 | Status: DISCONTINUED | OUTPATIENT
Start: 2023-11-18 | End: 2023-11-22

## 2023-11-18 RX ORDER — LIDOCAINE 4 G/100G
1 CREAM TOPICAL ONCE
Refills: 0 | Status: COMPLETED | OUTPATIENT
Start: 2023-11-18 | End: 2023-11-18

## 2023-11-18 RX ORDER — POTASSIUM CHLORIDE 20 MEQ
40 PACKET (EA) ORAL ONCE
Refills: 0 | Status: COMPLETED | OUTPATIENT
Start: 2023-11-18 | End: 2023-11-18

## 2023-11-18 RX ORDER — ACETAMINOPHEN 500 MG
650 TABLET ORAL ONCE
Refills: 0 | Status: COMPLETED | OUTPATIENT
Start: 2023-11-18 | End: 2023-11-18

## 2023-11-18 RX ORDER — SODIUM CHLORIDE 9 MG/ML
1000 INJECTION INTRAMUSCULAR; INTRAVENOUS; SUBCUTANEOUS
Refills: 0 | Status: DISCONTINUED | OUTPATIENT
Start: 2023-11-18 | End: 2023-11-18

## 2023-11-18 RX ORDER — POTASSIUM CHLORIDE 20 MEQ
20 PACKET (EA) ORAL
Refills: 0 | Status: DISCONTINUED | OUTPATIENT
Start: 2023-11-18 | End: 2023-11-18

## 2023-11-18 RX ADMIN — CHLORHEXIDINE GLUCONATE 1 APPLICATION(S): 213 SOLUTION TOPICAL at 12:42

## 2023-11-18 RX ADMIN — Medication 40 MILLIEQUIVALENT(S): at 12:45

## 2023-11-18 RX ADMIN — LACTULOSE 20 GRAM(S): 10 SOLUTION ORAL at 12:43

## 2023-11-18 RX ADMIN — Medication 100 MILLIGRAM(S): at 21:07

## 2023-11-18 RX ADMIN — Medication 100 MILLIGRAM(S): at 12:41

## 2023-11-18 RX ADMIN — Medication 100 MILLIGRAM(S): at 05:30

## 2023-11-18 RX ADMIN — PANTOPRAZOLE SODIUM 40 MILLIGRAM(S): 20 TABLET, DELAYED RELEASE ORAL at 06:03

## 2023-11-18 RX ADMIN — LACTULOSE 20 GRAM(S): 10 SOLUTION ORAL at 21:06

## 2023-11-18 RX ADMIN — Medication 102 MILLIGRAM(S): at 12:41

## 2023-11-18 RX ADMIN — LACTULOSE 10 GRAM(S): 10 SOLUTION ORAL at 05:29

## 2023-11-18 RX ADMIN — Medication 650 MILLIGRAM(S): at 13:15

## 2023-11-18 RX ADMIN — CEFTRIAXONE 100 MILLIGRAM(S): 500 INJECTION, POWDER, FOR SOLUTION INTRAMUSCULAR; INTRAVENOUS at 05:30

## 2023-11-18 RX ADMIN — Medication 650 MILLIGRAM(S): at 12:45

## 2023-11-18 RX ADMIN — LIDOCAINE 1 PATCH: 4 CREAM TOPICAL at 15:59

## 2023-11-18 RX ADMIN — Medication 40 MILLIEQUIVALENT(S): at 11:28

## 2023-11-18 RX ADMIN — Medication 150 MILLILITER(S): at 12:40

## 2023-11-18 NOTE — PROGRESS NOTE ADULT - SUBJECTIVE AND OBJECTIVE BOX
24H events:    Patient is a 28y old Male who presents with a chief complaint of cholestasis, abdominal ascites, hyponatremia (17 Nov 2023 14:13)    Primary diagnosis of Jaundice      Day 1: admitted to SDU for severe hyponatremia, large abdominal ascites, and cholestatic liver injury    Today is hospital day 2d. This morning patient was seen and examined at bedside, resting comfortably in bed.    No acute or major events overnight. Hemodynamically stable, tolerating oral diet, voiding appropriately with appropriate bowel movements.     This AM, patient states he is experiencing pain and weakness in his right thigh, which has been relieved with cold compress. States he has been tolerating his diet well. No nausea or vomiting.      PAST MEDICAL & SURGICAL HISTORY  No significant past surgical history      ALLERGIES:  No Known Allergies    MEDICATIONS:  STANDING MEDICATIONS  albumin human 25% IVPB 300 milliLiter(s) IV Intermittent <User Schedule>  cefTRIAXone   IVPB 1000 milliGRAM(s) IV Intermittent every 24 hours  chlorhexidine 2% Cloths 1 Application(s) Topical daily  influenza   Vaccine 0.5 milliLiter(s) IntraMuscular once  lactulose Syrup 10 Gram(s) Oral three times a day  metroNIDAZOLE  IVPB      metroNIDAZOLE  IVPB 500 milliGRAM(s) IV Intermittent every 8 hours  pantoprazole    Tablet 40 milliGRAM(s) Oral before breakfast  phytonadione  IVPB 10 milliGRAM(s) IV Intermittent daily    PRN MEDICATIONS  LORazepam   Injectable 2 milliGRAM(s) IntraMuscular every 2 hours PRN  traMADol 25 milliGRAM(s) Oral every 8 hours PRN    VITALS:   T(F): 98.1  HR: 95  BP: 98/55  RR: 19  SpO2: 100%    PHYSICAL EXAM:  GENERAL: NAD, lying in bed comfortably  HEAD: NCAD  NECK: Supple, no JVD    HEART: Regular rate and rhythm, normal S1/S2, no murmurs  LUNGS: No acute respiratory distress, clear b/l breath sounds  ABDOMEN:  soft, nontender, nondistended  EXTREMITIES: no LE edema  NERVOUS SYSTEM:  A&Ox3  SKIN: jaundiced    LABS:                        9.6    13.87 )-----------( 208      ( 17 Nov 2023 05:00 )             26.2     11-17    126<L>  |  x   |  x   ----------------------------<  x   x    |  x   |  1.3    Ca    7.9<L>      17 Nov 2023 05:00  Phos  3.6     11-17  Mg     2.6     11-17    TPro  5.0<L>  /  Alb  2.7<L>  /  TBili  30.5<HH>  /  DBili  >17.2  /  AST  141<H>  /  ALT  14  /  AlkPhos  143<H>  11-17    PT/INR - ( 17 Nov 2023 18:26 )   PT: 23.90 sec;   INR: 2.08 ratio           Urinalysis Basic - ( 17 Nov 2023 05:00 )    Color: x / Appearance: x / SG: x / pH: x  Gluc: 105 mg/dL / Ketone: x  / Bili: x / Urobili: x   Blood: x / Protein: x / Nitrite: x   Leuk Esterase: x / RBC: x / WBC x   Sq Epi: x / Non Sq Epi: x / Bacteria: x            Culture - Body Fluid with Gram Stain (collected 16 Nov 2023 12:27)  Source: Abdominal Fl Abdominal Fluid  Gram Stain (17 Nov 2023 01:13):    No polymorphonuclear leukocytes seen    No organisms seen    by cytocentrifuge  Preliminary Report (17 Nov 2023 19:54):    No growth to date.    Culture - Blood (collected 16 Nov 2023 06:59)  Source: .Blood None  Preliminary Report (17 Nov 2023 14:02):    No growth at 24 hours    Culture - Blood (collected 16 Nov 2023 06:59)  Source: .Blood None  Preliminary Report (17 Nov 2023 14:02):    No growth at 24 hours    Culture - Urine (collected 15 Nov 2023 23:00)  Source: Clean Catch Clean Catch (Midstream)  Final Report (17 Nov 2023 08:28):    <10,000 CFU/mL Normal Urogenital Delmis          RADIOLOGY:  No radiographic images in the past 24 hours.

## 2023-11-18 NOTE — PROGRESS NOTE ADULT - SUBJECTIVE AND OBJECTIVE BOX
seen and examined  24 h events noted   no new complaints         PAST HISTORY  --------------------------------------------------------------------------------  No significant changes to PMH, PSH, FHx, SHx, unless otherwise noted    ALLERGIES & MEDICATIONS  --------------------------------------------------------------------------------  Allergies    No Known Allergies    Intolerances      Standing Inpatient Medications  albumin human 25% IVPB 300 milliLiter(s) IV Intermittent <User Schedule>  cefTRIAXone   IVPB 1000 milliGRAM(s) IV Intermittent every 24 hours  chlorhexidine 2% Cloths 1 Application(s) Topical daily  influenza   Vaccine 0.5 milliLiter(s) IntraMuscular once  lactulose Syrup 10 Gram(s) Oral three times a day  metroNIDAZOLE  IVPB      metroNIDAZOLE  IVPB 500 milliGRAM(s) IV Intermittent every 8 hours  pantoprazole    Tablet 40 milliGRAM(s) Oral before breakfast  phytonadione  IVPB 10 milliGRAM(s) IV Intermittent daily  sodium chloride 0.9%. 1000 milliLiter(s) IV Continuous <Continuous>    PRN Inpatient Medications  LORazepam   Injectable 2 milliGRAM(s) IntraMuscular every 2 hours PRN  traMADol 25 milliGRAM(s) Oral every 8 hours PRN      --------------------------------------------------------------------------------      VITALS/PHYSICAL EXAM  --------------------------------------------------------------------------------  T(C): 36.7 (11-18-23 @ 04:00), Max: 37.7 (11-17-23 @ 20:00)  HR: 95 (11-18-23 @ 04:00) (95 - 107)  BP: 98/55 (11-18-23 @ 04:00) (92/54 - 105/57)  RR: 19 (11-18-23 @ 04:00) (18 - 20)  SpO2: 100% (11-18-23 @ 04:00) (95% - 100%)  Wt(kg): --        11-17-23 @ 07:01  -  11-18-23 @ 07:00  --------------------------------------------------------  IN: 100 mL / OUT: 400 mL / NET: -300 mL      Physical Exam:  	Gen: NAD,/ jaundice  	Pulm: CTA B/L  	CV: S1S2; no rub  	Abd: +distended  	LE: no edema      LABS/STUDIES  --------------------------------------------------------------------------------              8.7    13.28 >-----------<  187      [11-18-23 @ 07:04]              24.5     126  |  x   |  x   ----------------------------<  x       [11-17-23 @ 18:26]  x    |  x   |  1.3        Ca     7.9     [11-17-23 @ 05:00]      Mg     2.6     [11-17-23 @ 05:00]      Phos  3.6     [11-17-23 @ 05:00]    TPro  5.0  /  Alb  2.7  /  TBili  30.5  /  DBili  >17.2  /  AST  141  /  ALT  14  /  AlkPhos  143  [11-17-23 @ 19:44]    PT/INR: PT 23.90, INR 2.08       [11-17-23 @ 18:26]    Serum Osmolality 264      [11-16-23 @ 11:26]    Creatinine Trend:  SCr 1.3 [11-17 @ 18:26]  SCr 1.2 [11-17 @ 05:00]  SCr 1.2 [11-17 @ 00:23]  SCr 1.0 [11-16 @ 22:03]  SCr 1.0 [11-16 @ 16:34]    Urinalysis - [11-17-23 @ 05:00]      Color  / Appearance  / SG  / pH       Gluc 105 / Ketone   / Bili  / Urobili        Blood  / Protein  / Leuk Est  / Nitrite       RBC  / WBC  / Hyaline  / Gran  / Sq Epi  / Non Sq Epi  / Bacteria     Urine Creatinine 127      [11-16-23 @ 17:58]  Urine Protein 80      [11-16-23 @ 17:58]  Urine Sodium 20.0      [11-16-23 @ 17:58]  Urine Urea Nitrogen 888      [11-16-23 @ 17:58]  Urine Potassium 40      [11-16-23 @ 17:58]  Urine Osmolality 616      [11-16-23 @ 07:54]    Iron 64, TIBC --, %sat --      [11-16-23 @ 06:59]  Ferritin 1312      [11-16-23 @ 06:59]  TSH 1.96      [11-16-23 @ 11:26]    HBsAb Nonreact      [11-16-23 @ 06:59]  HBsAg Nonreact      [11-16-23 @ 06:59]  HBcAb Nonreact      [11-17-23 @ 05:00]  HCV 0.09, Nonreact      [11-16-23 @ 06:59]  HIV Nonreact      [11-16-23 @ 06:59]

## 2023-11-18 NOTE — PROGRESS NOTE ADULT - ASSESSMENT
27 yo male previously healthy presented today for worsening jaundice and abdominal distention that started 2 weeks ago. Since his jaundice started, the patient began drinking a lot of fluids minimum 4L / day, his abdominal distention kept getting worse and his urine became darker, with oliguria. He reports SOB attributed to his abdomen pushing up on his diaphragm.      Acute liver failure unclear etiology but suspected ETOH use  Elevated INR  used to be a daily 4 beer/day drinker with episodes of binge drinking, stopped 1 month ago  - CT abd pelvis:   a) Diffuse large amount of abdominal and pelvic ascites. Hepatosplenomegaly with right paracolic varices, findings suspicious for portal hypertension. Diffuse colonic wall thickening, possibly related to a diffuse colitis versus this severely edematous state of the patient. Nonspecific dilated loops of small bowel without definite evidence of obstruction.   b) Partial collapse of the right lower lobe, left lower lobe and right middle lobe.  - RUQ US :   a)  Enlarged fatty infiltrated liver (hepatomegaly with diffuse steatosis).  b) Sludge within the gallbladder lumen which has a thickened edematous wall. No echogenic stones. Negative sonographic Carter's sign. Thickened edematous gallbladder wall is a nonspecific finding possibly related to volume status in this patient.   c)  Ascites is noted.  - s/p paracentesis 11/16 with 3.2 L removed, no evidence of SBP  - hepatology following, pending labs requested  - currently on Albumin, and Vit K, lactulose. Monitor Mental status  - f/u  hepatology, patient did not wish to be transferred to LT center    Severe Subacute hyponatremia - likely hypervolemic 2/2 increased free water intake slowly correcting   YANDY  patient asymptomatic   - BMP monitoring Q8H  - fluid restriction  - Urine studies/ Uric Acid  - Nephrology following     Normocytic anemia with no signs of bleed, monitor    Overall prognosis is guarded, if worsening mental status or coagulopathy, will need urgent transfer to tplant center, hepatology on board       Patient seen at bedside, total time spent to evaluate and treat the patient's acute illness and chronic medical conditions as well as time spent reviewing labs, radiology, discussing medical plan with covering medical team was more than 50 minutes with >50% of time spent face to face with patient, discussing with patient/family as well as coordination of care

## 2023-11-18 NOTE — PROGRESS NOTE ADULT - ASSESSMENT
IMPRESSION:    Hyponatremia in setting of Liver Disease   Portal Hypertension/Hepatomegaly/Right Paracolic Varices/Alcoholic Steatosis   Hepatorenal Syndrome  Possible Diffuse Colitis  Transaminitis  Abdominal/Pelvic Ascites      PLAN:    CNS: mental status at baseline, thiamine, folate, CIWA monitoring, check ammonia level    HEENT: Oral care    PULMONARY:  HOB @ 45 degrees.  Aspiration precautions. on room air, incentive spirometry    CARDIOVASCULAR: target MAP, avoid beta-blockers,     GI: GI prophylaxis.  s/p paracentesis 3.2 L, Hepatology recs appreciated, albumin challenge, c/w bowel regimen. Hepatology f/u     RENAL:  Follow up lytes.  Correct as needed, Gentle hydration     INFECTIOUS DISEASE: Follow up cultures, continue Ceftriaxone and Flagyl. hepatitis     HEMATOLOGICAL:  DVT prophylaxis. maintain active type and screen, transfuse if Hgb<7.0    ENDOCRINE:  Follow up FS.  Insulin protocol if needed.    MUSCULOSKELETAL: activity as tolerated     SDU monitoring IMPRESSION:    Hyponatremia in setting of Liver Disease   Portal Hypertension/Hepatomegaly/Right Paracolic Varices/Alcoholic Steatosis / cirrhosis/ liver failure  Hepatorenal Syndrome  Possible Diffuse Colitis  Transaminitis  Abdominal/Pelvic Ascites      PLAN:    CNS: mental status at baseline, thiamine, folate, CIWA monitoring, check ammonia level    HEENT: Oral care    PULMONARY:  HOB @ 45 degrees.  Aspiration precautions. on room air, incentive spirometry    CARDIOVASCULAR: avoid overload    GI: GI prophylaxis.  s/p paracentesis 3.2 L, Hepatology recs appreciated, albumin challenge, c/w bowel regimen. Hepatology f/u     RENAL:  Follow up lytes.  Correct as needed,     INFECTIOUS DISEASE: abx    HEMATOLOGICAL:  DVT prophylaxis. maintain active type and screen, transfuse if Hgb<7.0    ENDOCRINE:  Follow up FS.  Insulin protocol if needed.    MUSCULOSKELETAL: activity as tolerated     SDU monitoring

## 2023-11-18 NOTE — PROGRESS NOTE ADULT - ASSESSMENT
Improved  Continue same  She was given refill  We will continue to monitor her blood pressure  She was told to get her blood work done  27 yo male previously healthy presented today for worsening jaundice and abdominal distention   that started 2 weeks ago. patient was admitted for etoh hepatitis.    # Etoh hepatitis MDF > 32  # ascites   # R/O etoh cirrhosis. varices in CT scan   # YANDY   # Coagulopathy   No encephalopathy   MELD3: 32  MELD-Na: 34 11/18    - CT abd pelv: Diffuse large amount of abdominal and pelvic ascites. Hepatosplenomegaly with right paracolic varices, findings suspicious for portal hypertension.   - RUQ US : 1.  Enlarged fatty infiltrated liver (hepatomegaly with diffuse steatosis). Ascites is noted.  T dylon 31.5       ALT 12  11-18-23 @ 07:04  T dylon 30.5       ALT 14  11-17-23 @ 19:44  T dylon 34.3   AP --  AST --  ALT --  11-17-23 @ 18:26  T dylon 30.6       ALT 13  11-17-23 @ 05:00  T dylon 29.9       ALT 14  11-17-23 @ 00:23     Hepatitis A IgM, Hepatitis B core IgM, core Ab total, surface Ag, surface Ab, HCV antibody, HCV RNA  - negative  GIOVANY, AMA, anti-Smooth Muscle Ab type 1, Anti liver-kidney microsomal Ab, Anti-soluble liver Ag, immunoglobulin panel,  Quantiferon level, Iron studies and ceruloplasmin, CMV PCR, EBV PCR, HSV IgM  - pending  - s/p 3.2 L paracentesis , SBP ruled out    PLAN   Albumin 1 g/kg QD for two days   Vitamin K 10 mg QD for three days   INR and LFT Q12   Please avoid hepatotoxic medications  Will start Eplerenone  and Lasix before discharge. Held secondary to YANDY  Lives with mother and brother. Works at [employer].   Please notify hepatology with any change on mental status or worsening INR for immediate LT center tranfer  Hold prednisone for now  Offered transfer to LT center but opted to be monitored here.  If encephalopathy or worsening coagulopathy will need urgent transfer .    # HCC screening:  - Please f/u as outpatient for US abdomen and AFP every 6 months.  - outpatient EGD for variceal screening    # Lifestyle/Dietary modifications  - Calorie intake 25-40 Kcal/kg/day  - Protein intake: 1.2-1.5 gm/kg/day  - Avoid smoking, alcohol, NSAIDS.  - Abstain from alcohol and all illicit drugs  -Avoid use of herbal and dietary supplements due to potential hepatotoxicity  -Limit use of acetaminophen to <2 grams per day  -Avoid use of nonsteroidal antiinflammatory drugs (NSAIDs)    -Avoid eating any unpasteurized dairy products; avoid eating any raw or undercooked eggs, fish, poultry, or meat; and avoid eating raw/steamed oysters or other shellfish to avoid risk of Vibrio infection.    #Vaccinations:  Please f/u in clinic for being uptodate with HAV/HBV/Influenza/Pneumococcal vaccines.    #LT evaluation  Social status: Lives with mother and brother. Brother volunteered to be a donor. 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    29 yo male previously healthy presented today for worsening jaundice and abdominal distention   that started 2 weeks ago. patient was admitted for etoh hepatitis.    # Etoh hepatitis MDF > 32  # ascites   # R/O etoh cirrhosis. varices in CT scan   # YANDY   # Coagulopathy   No encephalopathy   MELD3: 32  MELD-Na: 34 11/18    - CT abd pelv: Diffuse large amount of abdominal and pelvic ascites. Hepatosplenomegaly with right paracolic varices, findings suspicious for portal hypertension.   - RUQ US : 1.  Enlarged fatty infiltrated liver (hepatomegaly with diffuse steatosis). Ascites is noted.  T dylon 31.5       ALT 12  11-18-23 @ 07:04  T dylon 30.5       ALT 14  11-17-23 @ 19:44  T dylon 34.3   AP --  AST --  ALT --  11-17-23 @ 18:26  T dylon 30.6       ALT 13  11-17-23 @ 05:00  T dylon 29.9       ALT 14  11-17-23 @ 00:23     Hepatitis A IgM, Hepatitis B core IgM, core Ab total, surface Ag, surface Ab, HCV antibody, HCV RNA  - negative  GIOVANY, AMA, anti-Smooth Muscle Ab type 1, Anti liver-kidney microsomal Ab, Anti-soluble liver Ag, immunoglobulin panel,  Quantiferon level, Iron studies and ceruloplasmin, CMV PCR, EBV PCR, HSV IgM  - pending  - s/p 3.2 L paracentesis , SBP ruled out    PLAN   Albumin 1 g/kg QD for two days   IV Vitamin K 10 mg QD for three days   INR and LFT Q12   Please avoid hepatotoxic medications  Will start Eplerenone  and Lasix before discharge. Held secondary to YANDY  Lives with mother and brother. Works at [email or web address].   Please notify hepatology with any change on mental status or worsening INR for immediate LT center transfer  Hold prednisone for now  Offered transfer to LT center but opted to be monitored here.  If encephalopathy or worsening coagulopathy will need urgent transfer .    # HCC screening:  - Please f/u as outpatient for US abdomen and AFP every 6 months.  - outpatient EGD for variceal screening    # Lifestyle/Dietary modifications  - Calorie intake 25-40 Kcal/kg/day  - Protein intake: 1.2-1.5 gm/kg/day  - Avoid smoking, alcohol, NSAIDS.  - Abstain from alcohol and all illicit drugs  -Avoid use of herbal and dietary supplements due to potential hepatotoxicity  -Limit use of acetaminophen to <2 grams per day  -Avoid use of nonsteroidal antiinflammatory drugs (NSAIDs)    -Avoid eating any unpasteurized dairy products; avoid eating any raw or undercooked eggs, fish, poultry, or meat; and avoid eating raw/steamed oysters or other shellfish to avoid risk of Vibrio infection.    #Vaccinations:  Please f/u in clinic for being uptodate with HAV/HBV/Influenza/Pneumococcal vaccines.    #LT evaluation  Social status: Lives with mother and brother. Brother volunteered to be a donor. 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

## 2023-11-18 NOTE — PROGRESS NOTE ADULT - ASSESSMENT
27 yo male previously healthy presented today for worsening jaundice and abdominal distended that started 2 weeks ago. Since his jaundice started, the patient began drinking a lot of fluids minimum 4L / day, his abdominal distention kept getting worse and his urine became darker, with oliguria. He reports SOB attributed to his abdomen pushing up on his diaphragm. Last BM yesterday, patient passing gas.  Patient denies any pain, fever, or any other signs of symptoms. No hx of mood disorder.   Social hx: former smoker stopped 2 years ago, has 3-4 PY.   alcohol: drinks every other day 4-5 beers, however last drink was 3 weeks ago.   No drugs., no allergies.   Patient admitted to SDU for severe hyponatremia, large abdominal ascites, and cholestatic liver injury.     #Acute cholestatic liver injury  #New onset large abdominal ascites  #Hepatic steatosis   #Possible diffuse colitis   - On admission, labs remarkable for WBC 16K, hb 10.7 (no BL), INR 5.69 , Na 119, AG 15, total bili 31.1 , aLP 175,   - -ve alcohol level, acetaminophen and salicylate levels.  - CT abd pelv: Diffuse large amount of abdominal and pelvic ascites. Hepatosplenomegaly with right paracolic varices, findings suspicious for portal hypertension. Diffuse colonic wall thickening, possibly related to a diffuse colitis versus this severely edematous state of the patient. Nonspecific dilated loops of small bowel without definite evidence of obstruction.   Partial collapse of the right lower lobe, left lower lobe and right middle lobe.  - RUQ US : 1.  Enlarged fatty infiltrated liver (hepatomegaly with diffuse steatosis).  2.  Sludge within the gallbladder lumen which has a thickened edematous wall. No echogenic stones. Negative sonographic Carter's sign. Thickened edematous gallbladder wall is a nonspecific finding possibly related to volume status in this patient. 3.  Ascites is noted.  - s/p paracentesis on 11/16 with 3.2L obtained  - tramadol 25mg prn for severe pain  - hepatitis panel -ve  - c/w ceftriaxone and flagyl for possible colitis   - hepatology following:  - c/w albumin 1g/kg qD for 2 days (end 11/18)  - vitamin K 10mg qD for 3 days (end 11/19)  - INR and LFT q12  - f/u hepatology labs  - will consider transfer to transplant center based on progression    #severe hyponatremia - likely hypervolemic 2/2 increased free water intake   - patient asymptomatic   - fluid restriction  - nephro following  - per nephro, on NS at 50 cc/hr    #Oliguria with +ve UA   - f/u UCx  - c/w ceftriaxone 1g    #Normocytic anemia with no signs of bleed  - f/u anemia workup  - monitor cbc and keep active type and screen                                                                               ----------------------------------------------------  # DVT prophylaxis: SCDs  # GI prophylaxis: Protonix  # Diet: DASH/TLC  # Activity: IAT  # Code status: FULL CODE  # Disposition: SDU                                                                           --------------------------------------------------------    # Handoff:    - f/u with hepatology regarding whether or not patient to be transferred to [facility] for poss transplant 27 yo male previously healthy presented today for worsening jaundice and abdominal distended that started 2 weeks ago. Since his jaundice started, the patient began drinking a lot of fluids minimum 4L / day, his abdominal distention kept getting worse and his urine became darker, with oliguria. He reports SOB attributed to his abdomen pushing up on his diaphragm. Last BM yesterday, patient passing gas.  Patient denies any pain, fever, or any other signs of symptoms. No hx of mood disorder.   Social hx: former smoker stopped 2 years ago, has 3-4 PY.   alcohol: drinks every other day 4-5 beers, however last drink was 3 weeks ago.   No drugs., no allergies.   Patient admitted to SDU for severe hyponatremia, large abdominal ascites, and cholestatic liver injury.     #Acute cholestatic liver injury  #New onset large abdominal ascites  #Hepatic steatosis   #Possible diffuse colitis   - On admission, labs remarkable for WBC 16K, hb 10.7 (no BL), INR 5.69 , Na 119, AG 15, total bili 31.1 , aLP 175,   - -ve alcohol level, acetaminophen and salicylate levels.  - CT abd pelv: Diffuse large amount of abdominal and pelvic ascites. Hepatosplenomegaly with right paracolic varices, findings suspicious for portal hypertension. Diffuse colonic wall thickening, possibly related to a diffuse colitis versus this severely edematous state of the patient. Nonspecific dilated loops of small bowel without definite evidence of obstruction.   Partial collapse of the right lower lobe, left lower lobe and right middle lobe.  - RUQ US : 1.  Enlarged fatty infiltrated liver (hepatomegaly with diffuse steatosis).  2.  Sludge within the gallbladder lumen which has a thickened edematous wall. No echogenic stones. Negative sonographic Carter's sign. Thickened edematous gallbladder wall is a nonspecific finding possibly related to volume status in this patient. 3.  Ascites is noted.  - s/p paracentesis on 11/16 with 3.2L obtained  - tramadol 25mg prn for severe pain  - c/w lactulose 20mg TID and rifaximin 550 BID  - hepatitis panel -ve  - c/w ceftriaxone and flagyl for possible colitis   - hepatology following:  - c/w albumin 1g/kg qD for 2 days (end 11/18)  - vitamin K 10mg qD for 3 days (end 11/19)  - INR and LFT q12  - f/u hepatology labs  - will consider transfer to transplant center based on progression    #severe hyponatremia - likely hypervolemic 2/2 increased free water intake   - patient asymptomatic   - fluid restriction  - nephro following    #Oliguria with +ve UA   - f/u UCx  - c/w ceftriaxone 1g    #Normocytic anemia with no signs of bleed  - f/u anemia workup  - monitor cbc and keep active type and screen                                                                               ----------------------------------------------------  # DVT prophylaxis: SCDs  # GI prophylaxis: Protonix  # Diet: DASH/TLC  # Activity: IAT  # Code status: FULL CODE  # Disposition: SDU                                                                           --------------------------------------------------------    # Handoff:    - f/u with hepatology regarding whether or not patient to be transferred to [facility] for poss transplant  - ensure 3-4 bowel movements per day  - fluid restriction for now

## 2023-11-18 NOTE — PROGRESS NOTE ADULT - SUBJECTIVE AND OBJECTIVE BOX
SARABIAWILLIAM  28y Male    CHIEF COMPLAINT:    Patient is a 28y old  Male who presents with a chief complaint of cholestasis, abdominal ascites, hyponatremia (18 Nov 2023 08:06)    INTERVAL HPI/OVERNIGHT EVENTS:    Patient seen and examined. No acute events overnight. No active complaints     ROS: All other systems are negative.    Vital Signs:    T(F): 98.1 (11-18-23 @ 04:00), Max: 99.8 (11-17-23 @ 20:00)  HR: 95 (11-18-23 @ 04:00) (95 - 107)  BP: 98/55 (11-18-23 @ 04:00) (92/54 - 105/57)  RR: 19 (11-18-23 @ 04:00) (18 - 20)  SpO2: 100% (11-18-23 @ 04:00) (95% - 100%)    17 Nov 2023 07:01  -  18 Nov 2023 07:00  --------------------------------------------------------  IN: 100 mL / OUT: 400 mL / NET: -300 mL    PHYSICAL EXAM:    GENERAL:  NAD  SKIN: No rashes or lesions jaundiced  HEENT: Atraumatic. Normocephalic.    NECK: Supple, No JVD.    PULMONARY: CTA B/L. No wheezing. No rales  CVS: Normal S1, S2. Rate and Rhythm are regular.   ABDOMEN/GI: Soft, Nontender, distended; BS present  MSK:  No edema B/L LE. clubbing or cyanosis   NEUROLOGIC: moves all extremities   PSYCH: Alert & oriented x 3, normal affect    Consultant(s) Notes Reviewed:  [x ] YES  [ ] NO  Care Discussed with Consultants/Other Providers [ x] YES  [ ] NO    LABS:                        8.7    13.28 )-----------( 187      ( 18 Nov 2023 07:04 )             24.5     125<L>  |  89<L>  |  24<H>  ----------------------------<  111<H>  2.9<L>   |  22  |  1.2    Ca    7.8<L>      18 Nov 2023 07:04  Phos  3.6     11-17  Mg     2.6     11-18    TPro  4.9<L>  /  Alb  3.1<L>  /  TBili  31.5<HH>  /  DBili  x   /  AST  111<H>  /  ALT  12  /  AlkPhos  123<H>  11-18    PT/INR - ( 17 Nov 2023 18:26 )   PT: 23.90 sec;   INR: 2.08 ratio      Culture - Fungal, Body Fluid (collected 16 Nov 2023 12:27)  Source: Peritoneal Peritoneal Fluid  Preliminary Report (18 Nov 2023 08:03):    Testing in progress    Culture - Body Fluid with Gram Stain (collected 16 Nov 2023 12:27)  Source: Abdominal Fl Abdominal Fluid  Gram Stain (17 Nov 2023 01:13):    No polymorphonuclear leukocytes seen    No organisms seen    by cytocentrifuge  Preliminary Report (17 Nov 2023 19:54):    No growth to date.    Culture - Blood (collected 16 Nov 2023 06:59)  Source: .Blood None  Preliminary Report (17 Nov 2023 14:02):    No growth at 24 hours    Culture - Blood (collected 16 Nov 2023 06:59)  Source: .Blood None  Preliminary Report (17 Nov 2023 14:02):    No growth at 24 hours    Culture - Urine (collected 15 Nov 2023 23:00)  Source: Clean Catch Clean Catch (Midstream)  Final Report (17 Nov 2023 08:28):    <10,000 CFU/mL Normal Urogenital Delmis    RADIOLOGY & ADDITIONAL TESTS:  Imaging or report Personally Reviewed:  [x] YES  [ ] NO  EKG reviewed: [x] YES  [ ] NO    Medications:  Standing  albumin human 25% IVPB 300 milliLiter(s) IV Intermittent <User Schedule>  cefTRIAXone   IVPB 1000 milliGRAM(s) IV Intermittent every 24 hours  chlorhexidine 2% Cloths 1 Application(s) Topical daily  influenza   Vaccine 0.5 milliLiter(s) IntraMuscular once  lactulose Syrup 20 Gram(s) Oral three times a day  metroNIDAZOLE  IVPB      metroNIDAZOLE  IVPB 500 milliGRAM(s) IV Intermittent every 8 hours  pantoprazole    Tablet 40 milliGRAM(s) Oral before breakfast  phytonadione  IVPB 10 milliGRAM(s) IV Intermittent daily  potassium chloride   Powder 40 milliEquivalent(s) Oral once  sodium chloride 0.9%. 1000 milliLiter(s) IV Continuous <Continuous>    PRN Meds  LORazepam   Injectable 2 milliGRAM(s) IntraMuscular every 2 hours PRN  traMADol 25 milliGRAM(s) Oral every 8 hours PRN

## 2023-11-18 NOTE — PROGRESS NOTE ADULT - ASSESSMENT
Patient is a 28y Male with no pmhx, he presented to ED on 11/15 for worsening jaundice and abdominal distension x 3 weeks with  dark urine and oliguria x 4 days , reports drinking drinking fluids minimum 4L / day to try and urinate.# YANDY  # hyponatremia   #  hepatitis   - creat noted stable / check repeat   - start midodrine 5 q 8   - sodium trending up  - sp Albumin   - serum sodium better / U Na noted low and U osm elevated c/w ADH presence   -  followed by hepatology   will follow

## 2023-11-18 NOTE — PROGRESS NOTE ADULT - SUBJECTIVE AND OBJECTIVE BOX
Gastroenterology progress note:     Patient is a 28y old  Male who presents with a chief complaint of cholestasis, abdominal ascites, hyponatremia (18 Nov 2023 08:31)       Admitted on: 11-16-23    We are following the patient for:       Interval History:    No acute events overnight.   - Diet -   - last BM -   - Abdominal pain -       PAST MEDICAL & SURGICAL HISTORY:  No significant past surgical history          MEDICATIONS  (STANDING):  albumin human 25% IVPB 300 milliLiter(s) IV Intermittent <User Schedule>  cefTRIAXone   IVPB 1000 milliGRAM(s) IV Intermittent every 24 hours  chlorhexidine 2% Cloths 1 Application(s) Topical daily  influenza   Vaccine 0.5 milliLiter(s) IntraMuscular once  lactulose Syrup 20 Gram(s) Oral three times a day  metroNIDAZOLE  IVPB      metroNIDAZOLE  IVPB 500 milliGRAM(s) IV Intermittent every 8 hours  pantoprazole    Tablet 40 milliGRAM(s) Oral before breakfast  phytonadione  IVPB 10 milliGRAM(s) IV Intermittent daily  potassium chloride   Powder 40 milliEquivalent(s) Oral once  rifAXIMin 550 milliGRAM(s) Oral two times a day    MEDICATIONS  (PRN):  LORazepam   Injectable 2 milliGRAM(s) IntraMuscular every 2 hours PRN Symptom-triggered: 2 point increase in CIWA -Ar score and a total score of 7 or LESS  traMADol 25 milliGRAM(s) Oral every 8 hours PRN Severe Pain (7 - 10)      Allergies  No Known Allergies      Review of Systems:   Cardiovascular:  No Chest Pain, No Palpitations  Respiratory:  No Cough, No Dyspnea  Gastrointestinal:  As described in HPI  Skin:  No Skin Lesions, No Jaundice  Neuro:  No Syncope, No Dizziness    Physical Examination:  T(C): 37.3 (11-18-23 @ 08:00), Max: 37.7 (11-17-23 @ 20:00)  HR: 112 (11-18-23 @ 08:00) (95 - 112)  BP: 100/57 (11-18-23 @ 08:00) (92/54 - 105/57)  RR: 20 (11-18-23 @ 08:00) (18 - 20)  SpO2: 98% (11-18-23 @ 08:00) (95% - 100%)      11-17-23 @ 07:01  -  11-18-23 @ 07:00  --------------------------------------------------------  IN: 100 mL / OUT: 400 mL / NET: -300 mL        GENERAL: AAOx3, no acute distress.  HEAD:  Atraumatic, Normocephalic  EYES: conjunctiva and sclera clear  NECK: Supple, no JVD or thyromegaly  CHEST/LUNG: Clear to auscultation bilaterally; No wheeze, rhonchi, or rales  HEART: Regular rate and rhythm; normal S1, S2, No murmurs.  ABDOMEN: Soft, nontender, nondistended; Bowel sounds present  NEUROLOGY: No asterixis or tremor.   SKIN: Intact, no jaundice     Data:                        8.7    13.28 )-----------( 187      ( 18 Nov 2023 07:04 )             24.5     Hgb trend:  8.7  11-18-23 @ 07:04  9.6  11-17-23 @ 05:00  10.0  11-16-23 @ 06:59  10.7  11-15-23 @ 20:43        11-18    125<L>  |  89<L>  |  24<H>  ----------------------------<  111<H>  2.9<L>   |  22  |  1.2    Ca    7.8<L>      18 Nov 2023 07:04  Phos  3.6     11-17  Mg     2.6     11-18    TPro  4.9<L>  /  Alb  3.1<L>  /  TBili  31.5<HH>  /  DBili  x   /  AST  111<H>  /  ALT  12  /  AlkPhos  123<H>  11-18    Liver panel trend:  TBili 31.5   /      /   ALT 12   /   AlkP 123   /   Tptn 4.9   /   Alb 3.1    /   DBili --      11-18  TBili 30.5   /      /   ALT 14   /   AlkP 143   /   Tptn 5.0   /   Alb 2.7    /   DBili >17.2      11-17  TBili 34.3   /   AST --   /   ALT --   /   AlkP --   /   Tptn --   /   Alb --    /   DBili --      11-17  TBili 30.6   /      /   ALT 13   /   AlkP 150   /   Tptn 5.0   /   Alb 2.7    /   DBili 20.1      11-17  TBili 29.9   /      /   ALT 14   /   AlkP 147   /   Tptn 5.0   /   Alb 2.5    /   DBili 20.3      11-17  TBili 29.2   /      /   ALT 14   /   AlkP 165   /   Tptn 5.1   /   Alb 2.4    /   DBili >17.2      11-16  TBili 29.6   /      /   ALT 15   /   AlkP 162   /   Tptn 5.3   /   Alb 2.5    /   DBili >17.2      11-16  TBili 31.1   /      /   ALT 15   /   AlkP 175   /   Tptn 5.5   /   Alb 2.7    /   DBili >17.2      11-15      PT/INR - ( 18 Nov 2023 07:04 )   PT: 24.60 sec;   INR: 2.10 ratio         PTT - ( 18 Nov 2023 07:04 )  PTT:39.0 sec    Culture - Fungal, Body Fluid (collected 16 Nov 2023 12:27)  Source: Peritoneal Peritoneal Fluid  Preliminary Report (18 Nov 2023 08:03):    Testing in progress    Culture - Body Fluid with Gram Stain (collected 16 Nov 2023 12:27)  Source: Abdominal Fl Abdominal Fluid  Gram Stain (17 Nov 2023 01:13):    No polymorphonuclear leukocytes seen    No organisms seen    by cytocentrifuge  Preliminary Report (17 Nov 2023 19:54):    No growth to date.    Culture - Blood (collected 16 Nov 2023 06:59)  Source: .Blood None  Preliminary Report (17 Nov 2023 14:02):    No growth at 24 hours    Culture - Blood (collected 16 Nov 2023 06:59)  Source: .Blood None  Preliminary Report (17 Nov 2023 14:02):    No growth at 24 hours    Culture - Urine (collected 15 Nov 2023 23:00)  Source: Clean Catch Clean Catch (Midstream)  Final Report (17 Nov 2023 08:28):    <10,000 CFU/mL Normal Urogenital Delmis         Radiology:       Gastroenterology progress note:     Patient is a 28y old  Male who presents with a chief complaint of cholestasis, abdominal ascites, hyponatremia (18 Nov 2023 08:31)       Admitted on: 11-16-23    We are following the patient for: decompensated cirrhosis       Interval History: pt labs stable; no confusion       PAST MEDICAL & SURGICAL HISTORY:  No significant past surgical history          MEDICATIONS  (STANDING):  albumin human 25% IVPB 300 milliLiter(s) IV Intermittent <User Schedule>  cefTRIAXone   IVPB 1000 milliGRAM(s) IV Intermittent every 24 hours  chlorhexidine 2% Cloths 1 Application(s) Topical daily  influenza   Vaccine 0.5 milliLiter(s) IntraMuscular once  lactulose Syrup 20 Gram(s) Oral three times a day  metroNIDAZOLE  IVPB      metroNIDAZOLE  IVPB 500 milliGRAM(s) IV Intermittent every 8 hours  pantoprazole    Tablet 40 milliGRAM(s) Oral before breakfast  phytonadione  IVPB 10 milliGRAM(s) IV Intermittent daily  potassium chloride   Powder 40 milliEquivalent(s) Oral once  rifAXIMin 550 milliGRAM(s) Oral two times a day    MEDICATIONS  (PRN):  LORazepam   Injectable 2 milliGRAM(s) IntraMuscular every 2 hours PRN Symptom-triggered: 2 point increase in CIWA -Ar score and a total score of 7 or LESS  traMADol 25 milliGRAM(s) Oral every 8 hours PRN Severe Pain (7 - 10)      Allergies  No Known Allergies      Review of Systems:   Cardiovascular:  No Chest Pain, No Palpitations  Respiratory:  No Cough, No Dyspnea  Gastrointestinal:  As described in HPI  Skin:  No Skin Lesions, No Jaundice  Neuro:  No Syncope, No Dizziness    Physical Examination:  T(C): 37.3 (11-18-23 @ 08:00), Max: 37.7 (11-17-23 @ 20:00)  HR: 112 (11-18-23 @ 08:00) (95 - 112)  BP: 100/57 (11-18-23 @ 08:00) (92/54 - 105/57)  RR: 20 (11-18-23 @ 08:00) (18 - 20)  SpO2: 98% (11-18-23 @ 08:00) (95% - 100%)      11-17-23 @ 07:01  -  11-18-23 @ 07:00  --------------------------------------------------------  IN: 100 mL / OUT: 400 mL / NET: -300 mL        GENERAL: AAOx3, no acute distress.  HEAD:  Atraumatic, Normocephalic  EYES: conjunctiva and sclera jaundice+  NECK: Supple, no JVD or thyromegaly  CHEST/LUNG: Clear to auscultation bilaterally; No wheeze, rhonchi, or rales  HEART: Regular rate and rhythm; normal S1, S2, No murmurs.  ABDOMEN: Soft, nontender, mildly distended; Bowel sounds present  NEUROLOGY: No asterixis or tremor.   SKIN: Intact,  jaundice +     Data:                        8.7    13.28 )-----------( 187      ( 18 Nov 2023 07:04 )             24.5     Hgb trend:  8.7  11-18-23 @ 07:04  9.6  11-17-23 @ 05:00  10.0  11-16-23 @ 06:59  10.7  11-15-23 @ 20:43        11-18    125<L>  |  89<L>  |  24<H>  ----------------------------<  111<H>  2.9<L>   |  22  |  1.2    Ca    7.8<L>      18 Nov 2023 07:04  Phos  3.6     11-17  Mg     2.6     11-18    TPro  4.9<L>  /  Alb  3.1<L>  /  TBili  31.5<HH>  /  DBili  x   /  AST  111<H>  /  ALT  12  /  AlkPhos  123<H>  11-18    Liver panel trend:  TBili 31.5   /      /   ALT 12   /   AlkP 123   /   Tptn 4.9   /   Alb 3.1    /   DBili --      11-18  TBili 30.5   /      /   ALT 14   /   AlkP 143   /   Tptn 5.0   /   Alb 2.7    /   DBili >17.2      11-17  TBili 34.3   /   AST --   /   ALT --   /   AlkP --   /   Tptn --   /   Alb --    /   DBili --      11-17  TBili 30.6   /      /   ALT 13   /   AlkP 150   /   Tptn 5.0   /   Alb 2.7    /   DBili 20.1      11-17  TBili 29.9   /      /   ALT 14   /   AlkP 147   /   Tptn 5.0   /   Alb 2.5    /   DBili 20.3      11-17  TBili 29.2   /      /   ALT 14   /   AlkP 165   /   Tptn 5.1   /   Alb 2.4    /   DBili >17.2      11-16  TBili 29.6   /      /   ALT 15   /   AlkP 162   /   Tptn 5.3   /   Alb 2.5    /   DBili >17.2      11-16  TBili 31.1   /      /   ALT 15   /   AlkP 175   /   Tptn 5.5   /   Alb 2.7    /   DBili >17.2      11-15      PT/INR - ( 18 Nov 2023 07:04 )   PT: 24.60 sec;   INR: 2.10 ratio         PTT - ( 18 Nov 2023 07:04 )  PTT:39.0 sec    Culture - Fungal, Body Fluid (collected 16 Nov 2023 12:27)  Source: Peritoneal Peritoneal Fluid  Preliminary Report (18 Nov 2023 08:03):    Testing in progress    Culture - Body Fluid with Gram Stain (collected 16 Nov 2023 12:27)  Source: Abdominal Fl Abdominal Fluid  Gram Stain (17 Nov 2023 01:13):    No polymorphonuclear leukocytes seen    No organisms seen    by cytocentrifuge  Preliminary Report (17 Nov 2023 19:54):    No growth to date.    Culture - Blood (collected 16 Nov 2023 06:59)  Source: .Blood None  Preliminary Report (17 Nov 2023 14:02):    No growth at 24 hours    Culture - Blood (collected 16 Nov 2023 06:59)  Source: .Blood None  Preliminary Report (17 Nov 2023 14:02):    No growth at 24 hours    Culture - Urine (collected 15 Nov 2023 23:00)  Source: Clean Catch Clean Catch (Midstream)  Final Report (17 Nov 2023 08:28):    <10,000 CFU/mL Normal Urogenital Delmis         Radiology:

## 2023-11-18 NOTE — PROGRESS NOTE ADULT - ATTENDING COMMENTS
Agree with above.  Patient with severe alcoholic hepatitis and YANDY, being followed.  He has not been placed on prednisolone given his YANDY.  Last drink was 4 weeks ago and bilirubin is uptrending up to 33 today.  His INR is 2.3 today down from 2.7 on the day of admission.  Had a prolonged discussion with the patient in the presence of his brother and parents.  He is AAOx3 and has no hepatic encephalopathy.    Advised the patient that he will benefit from transfer and monitoring at a transplant center given his severe alcoholic hepatitis.  I also advised the patient about the increased mortality associated with this condition and the risk of quick decompensation.  In addition, we explained to him that he would ideally benefit from LT evaluation given his young age if he commits to quitting alcohol.  Despite educating the patient, he remains adamant about being monitored at [facility] and would like to think about being transferred to a transplant center later during his stay.  I advised him that in the case of decompensation with hepatic encephalopathy or worsening coagulopathy, he will need transfer to a transplant center if he is hemodynamically stable.  Patient and family stated understanding the above.    -Please continue to monitor LFTs and INR to 8 hours.  Monitor mental status.  Inform GI stat if patient develops changes in mental status or worsening coagulopathy. We will follow very closely.    Time-based billing (NON-critical care).   50 minutes spent on total encounter; more than 50% of the visit was spent counseling and / or coordinating care by the attending physician.  The necessity of the time spent during the encounter on this date of service was due to: Coordination of care.

## 2023-11-18 NOTE — PROGRESS NOTE ADULT - SUBJECTIVE AND OBJECTIVE BOX
Patient is a 28y old  Male who presents with a chief complaint of cholestasis, abdominal ascites, hyponatremia (18 Nov 2023 08:06)        Over Night Events: No overnight events         ROS:     All ROS are negative except HPI         PHYSICAL EXAM    ICU Vital Signs Last 24 Hrs  T(C): 36.7 (18 Nov 2023 04:00), Max: 37.7 (17 Nov 2023 20:00)  T(F): 98.1 (18 Nov 2023 04:00), Max: 99.8 (17 Nov 2023 20:00)  HR: 95 (18 Nov 2023 04:00) (95 - 107)  BP: 98/55 (18 Nov 2023 04:00) (92/54 - 105/57)  BP(mean): 76 (17 Nov 2023 12:30) (69 - 76)  ABP: --  ABP(mean): --  RR: 19 (18 Nov 2023 04:00) (18 - 20)  SpO2: 100% (18 Nov 2023 04:00) (95% - 100%)    O2 Parameters below as of 18 Nov 2023 04:00  Patient On (Oxygen Delivery Method): room air            CONSTITUTIONAL:  ill appearing  Visibly jaundiced    ENT:   Airway patent,   Mouth with normal mucosa.      CARDIAC:   Normal rate,   Regular rhythm.              RESPIRATORY:   No wheezing  Bilateral BS  Normal chest expansion  Not tachypneic,  No use of accessory muscles    GASTROINTESTINAL:  Abdomen soft,   Non-tender,   No guarding,   + BS       NEUROLOGICAL:   Alert and oriented   No motor  deficits.       11-17-23 @ 07:01  -  11-18-23 @ 07:00  --------------------------------------------------------  IN:    IV PiggyBack: 100 mL  Total IN: 100 mL    OUT:    Voided (mL): 400 mL  Total OUT: 400 mL    Total NET: -300 mL          LABS:                            8.7    13.28 )-----------( 187      ( 18 Nov 2023 07:04 )             24.5                                               11-18    125<L>  |  89<L>  |  24<H>  ----------------------------<  111<H>  2.9<L>   |  22  |  1.2    Ca    7.8<L>      18 Nov 2023 07:04  Phos  3.6     11-17  Mg     2.6     11-18    TPro  4.9<L>  /  Alb  3.1<L>  /  TBili  x   /  DBili  x   /  AST  111<H>  /  ALT  12  /  AlkPhos  123<H>  11-18      PT/INR - ( 17 Nov 2023 18:26 )   PT: 23.90 sec;   INR: 2.08 ratio                                                Urinalysis Basic - ( 18 Nov 2023 07:04 )    Color: x / Appearance: x / SG: x / pH: x  Gluc: 111 mg/dL / Ketone: x  / Bili: x / Urobili: x   Blood: x / Protein: x / Nitrite: x   Leuk Esterase: x / RBC: x / WBC x   Sq Epi: x / Non Sq Epi: x / Bacteria: x                                                  LIVER FUNCTIONS - ( 18 Nov 2023 07:04 )  Alb: 3.1 g/dL / Pro: 4.9 g/dL / ALK PHOS: 123 U/L / ALT: 12 U/L / AST: 111 U/L / GGT: x                                                  Culture - Fungal, Body Fluid (collected 16 Nov 2023 12:27)  Source: Peritoneal Peritoneal Fluid  Preliminary Report (18 Nov 2023 08:03):    Testing in progress    Culture - Body Fluid with Gram Stain (collected 16 Nov 2023 12:27)  Source: Abdominal Fl Abdominal Fluid  Gram Stain (17 Nov 2023 01:13):    No polymorphonuclear leukocytes seen    No organisms seen    by cytocentrifuge  Preliminary Report (17 Nov 2023 19:54):    No growth to date.    Culture - Blood (collected 16 Nov 2023 06:59)  Source: .Blood None  Preliminary Report (17 Nov 2023 14:02):    No growth at 24 hours    Culture - Blood (collected 16 Nov 2023 06:59)  Source: .Blood None  Preliminary Report (17 Nov 2023 14:02):    No growth at 24 hours    Culture - Urine (collected 15 Nov 2023 23:00)  Source: Clean Catch Clean Catch (Midstream)  Final Report (17 Nov 2023 08:28):    <10,000 CFU/mL Normal Urogenital Delmis                                                                                           MEDICATIONS  (STANDING):  albumin human 25% IVPB 300 milliLiter(s) IV Intermittent <User Schedule>  cefTRIAXone   IVPB 1000 milliGRAM(s) IV Intermittent every 24 hours  chlorhexidine 2% Cloths 1 Application(s) Topical daily  influenza   Vaccine 0.5 milliLiter(s) IntraMuscular once  lactulose Syrup 10 Gram(s) Oral three times a day  metroNIDAZOLE  IVPB      metroNIDAZOLE  IVPB 500 milliGRAM(s) IV Intermittent every 8 hours  pantoprazole    Tablet 40 milliGRAM(s) Oral before breakfast  phytonadione  IVPB 10 milliGRAM(s) IV Intermittent daily  sodium chloride 0.9%. 1000 milliLiter(s) (50 mL/Hr) IV Continuous <Continuous>    MEDICATIONS  (PRN):  LORazepam   Injectable 2 milliGRAM(s) IntraMuscular every 2 hours PRN Symptom-triggered: 2 point increase in CIWA -Ar score and a total score of 7 or LESS  traMADol 25 milliGRAM(s) Oral every 8 hours PRN Severe Pain (7 - 10)               Patient is a 28y old  Male who presents with a chief complaint of cholestasis, abdominal ascites, hyponatremia (18 Nov 2023 08:06)        Over Night Events: No overnight events, on RA, hepato noted      PHYSICAL EXAM    ICU Vital Signs Last 24 Hrs  T(C): 36.7 (18 Nov 2023 04:00), Max: 37.7 (17 Nov 2023 20:00)  T(F): 98.1 (18 Nov 2023 04:00), Max: 99.8 (17 Nov 2023 20:00)  HR: 95 (18 Nov 2023 04:00) (95 - 107)  BP: 98/55 (18 Nov 2023 04:00) (92/54 - 105/57)  BP(mean): 76 (17 Nov 2023 12:30) (69 - 76)  RR: 19 (18 Nov 2023 04:00) (18 - 20)  SpO2: 100% (18 Nov 2023 04:00) (95% - 100%)    O2 Parameters below as of 18 Nov 2023 04:00  Patient On (Oxygen Delivery Method): room air            CONSTITUTIONAL:  ill appearing  Visibly jaundiced  icteric sclera    ENT:   Airway patent,   Mouth with normal mucosa.      CARDIAC:   Normal rate,   Regular rhythm.              RESPIRATORY:   No wheezing  Bilateral BS  Normal chest expansion  Not tachypneic,  No use of accessory muscles    GASTROINTESTINAL:  Abdomen soft, distended  Non-tender,   No guarding,   + BS       NEUROLOGICAL:   Alert and oriented   No motor  deficits.       11-17-23 @ 07:01  -  11-18-23 @ 07:00  --------------------------------------------------------  IN:    IV PiggyBack: 100 mL  Total IN: 100 mL    OUT:    Voided (mL): 400 mL  Total OUT: 400 mL    Total NET: -300 mL          LABS:                            8.7    13.28 )-----------( 187      ( 18 Nov 2023 07:04 )             24.5                                               11-18    125<L>  |  89<L>  |  24<H>  ----------------------------<  111<H>  2.9<L>   |  22  |  1.2    Ca    7.8<L>      18 Nov 2023 07:04  Phos  3.6     11-17  Mg     2.6     11-18    TPro  4.9<L>  /  Alb  3.1<L>  /  TBili  x   /  DBili  x   /  AST  111<H>  /  ALT  12  /  AlkPhos  123<H>  11-18      PT/INR - ( 17 Nov 2023 18:26 )   PT: 23.90 sec;   INR: 2.08 ratio                                                Urinalysis Basic - ( 18 Nov 2023 07:04 )    Color: x / Appearance: x / SG: x / pH: x  Gluc: 111 mg/dL / Ketone: x  / Bili: x / Urobili: x   Blood: x / Protein: x / Nitrite: x   Leuk Esterase: x / RBC: x / WBC x   Sq Epi: x / Non Sq Epi: x / Bacteria: x                                                  LIVER FUNCTIONS - ( 18 Nov 2023 07:04 )  Alb: 3.1 g/dL / Pro: 4.9 g/dL / ALK PHOS: 123 U/L / ALT: 12 U/L / AST: 111 U/L / GGT: x                                                  Culture - Fungal, Body Fluid (collected 16 Nov 2023 12:27)  Source: Peritoneal Peritoneal Fluid  Preliminary Report (18 Nov 2023 08:03):    Testing in progress    Culture - Body Fluid with Gram Stain (collected 16 Nov 2023 12:27)  Source: Abdominal Fl Abdominal Fluid  Gram Stain (17 Nov 2023 01:13):    No polymorphonuclear leukocytes seen    No organisms seen    by cytocentrifuge  Preliminary Report (17 Nov 2023 19:54):    No growth to date.    Culture - Blood (collected 16 Nov 2023 06:59)  Source: .Blood None  Preliminary Report (17 Nov 2023 14:02):    No growth at 24 hours    Culture - Blood (collected 16 Nov 2023 06:59)  Source: .Blood None  Preliminary Report (17 Nov 2023 14:02):    No growth at 24 hours    Culture - Urine (collected 15 Nov 2023 23:00)  Source: Clean Catch Clean Catch (Midstream)  Final Report (17 Nov 2023 08:28):    <10,000 CFU/mL Normal Urogenital Delmis                                                                                           MEDICATIONS  (STANDING):  albumin human 25% IVPB 300 milliLiter(s) IV Intermittent <User Schedule>  cefTRIAXone   IVPB 1000 milliGRAM(s) IV Intermittent every 24 hours  chlorhexidine 2% Cloths 1 Application(s) Topical daily  influenza   Vaccine 0.5 milliLiter(s) IntraMuscular once  lactulose Syrup 10 Gram(s) Oral three times a day  metroNIDAZOLE  IVPB      metroNIDAZOLE  IVPB 500 milliGRAM(s) IV Intermittent every 8 hours  pantoprazole    Tablet 40 milliGRAM(s) Oral before breakfast  phytonadione  IVPB 10 milliGRAM(s) IV Intermittent daily  sodium chloride 0.9%. 1000 milliLiter(s) (50 mL/Hr) IV Continuous <Continuous>    MEDICATIONS  (PRN):  LORazepam   Injectable 2 milliGRAM(s) IntraMuscular every 2 hours PRN Symptom-triggered: 2 point increase in CIWA -Ar score and a total score of 7 or LESS  traMADol 25 milliGRAM(s) Oral every 8 hours PRN Severe Pain (7 - 10)

## 2023-11-18 NOTE — PROGRESS NOTE ADULT - ATTENDING COMMENTS
events noted, on RA, TB 31, renal/ hepato noted, consider transfer to transplant center, plan as above

## 2023-11-19 LAB
ALBUMIN SERPL ELPH-MCNC: 3.5 G/DL — SIGNIFICANT CHANGE UP (ref 3.5–5.2)
ALBUMIN SERPL ELPH-MCNC: 3.5 G/DL — SIGNIFICANT CHANGE UP (ref 3.5–5.2)
ALP SERPL-CCNC: 110 U/L — SIGNIFICANT CHANGE UP (ref 30–115)
ALP SERPL-CCNC: 110 U/L — SIGNIFICANT CHANGE UP (ref 30–115)
ALT FLD-CCNC: 11 U/L — SIGNIFICANT CHANGE UP (ref 0–41)
ALT FLD-CCNC: 11 U/L — SIGNIFICANT CHANGE UP (ref 0–41)
ANION GAP SERPL CALC-SCNC: 17 MMOL/L — HIGH (ref 7–14)
ANION GAP SERPL CALC-SCNC: 17 MMOL/L — HIGH (ref 7–14)
APTT BLD: 38.8 SEC — SIGNIFICANT CHANGE UP (ref 27–39.2)
APTT BLD: 38.8 SEC — SIGNIFICANT CHANGE UP (ref 27–39.2)
APTT BLD: 40.5 SEC — HIGH (ref 27–39.2)
APTT BLD: 40.5 SEC — HIGH (ref 27–39.2)
AST SERPL-CCNC: 105 U/L — HIGH (ref 0–41)
AST SERPL-CCNC: 105 U/L — HIGH (ref 0–41)
BASOPHILS # BLD AUTO: 0.08 K/UL — SIGNIFICANT CHANGE UP (ref 0–0.2)
BASOPHILS # BLD AUTO: 0.08 K/UL — SIGNIFICANT CHANGE UP (ref 0–0.2)
BASOPHILS NFR BLD AUTO: 0.6 % — SIGNIFICANT CHANGE UP (ref 0–1)
BASOPHILS NFR BLD AUTO: 0.6 % — SIGNIFICANT CHANGE UP (ref 0–1)
BILIRUB SERPL-MCNC: 32.6 MG/DL — CRITICAL HIGH (ref 0.2–1.2)
BILIRUB SERPL-MCNC: 32.6 MG/DL — CRITICAL HIGH (ref 0.2–1.2)
BUN SERPL-MCNC: 27 MG/DL — HIGH (ref 10–20)
BUN SERPL-MCNC: 27 MG/DL — HIGH (ref 10–20)
CALCIUM SERPL-MCNC: 7.7 MG/DL — LOW (ref 8.4–10.5)
CALCIUM SERPL-MCNC: 7.7 MG/DL — LOW (ref 8.4–10.5)
CHLORIDE SERPL-SCNC: 94 MMOL/L — LOW (ref 98–110)
CHLORIDE SERPL-SCNC: 94 MMOL/L — LOW (ref 98–110)
CO2 SERPL-SCNC: 17 MMOL/L — SIGNIFICANT CHANGE UP (ref 17–32)
CO2 SERPL-SCNC: 17 MMOL/L — SIGNIFICANT CHANGE UP (ref 17–32)
CREAT SERPL-MCNC: 1.3 MG/DL — SIGNIFICANT CHANGE UP (ref 0.7–1.5)
CREAT SERPL-MCNC: 1.3 MG/DL — SIGNIFICANT CHANGE UP (ref 0.7–1.5)
EBV EA AB SER IA-ACNC: 129 U/ML — HIGH
EBV EA AB SER IA-ACNC: 129 U/ML — HIGH
EBV EA AB TITR SER IF: POSITIVE
EBV EA AB TITR SER IF: POSITIVE
EBV EA IGG SER-ACNC: POSITIVE
EBV EA IGG SER-ACNC: POSITIVE
EBV NA IGG SER IA-ACNC: 113 U/ML — HIGH
EBV NA IGG SER IA-ACNC: 113 U/ML — HIGH
EBV PATRN SPEC IB-IMP: SIGNIFICANT CHANGE UP
EBV PATRN SPEC IB-IMP: SIGNIFICANT CHANGE UP
EBV VCA IGG AVIDITY SER QL IA: POSITIVE
EBV VCA IGG AVIDITY SER QL IA: POSITIVE
EBV VCA IGM SER IA-ACNC: 16.5 U/ML — SIGNIFICANT CHANGE UP
EBV VCA IGM SER IA-ACNC: 16.5 U/ML — SIGNIFICANT CHANGE UP
EBV VCA IGM SER IA-ACNC: 161 U/ML — HIGH
EBV VCA IGM SER IA-ACNC: 161 U/ML — HIGH
EBV VCA IGM TITR FLD: NEGATIVE — SIGNIFICANT CHANGE UP
EBV VCA IGM TITR FLD: NEGATIVE — SIGNIFICANT CHANGE UP
EGFR: 76 ML/MIN/1.73M2 — SIGNIFICANT CHANGE UP
EGFR: 76 ML/MIN/1.73M2 — SIGNIFICANT CHANGE UP
EOSINOPHIL # BLD AUTO: 0.14 K/UL — SIGNIFICANT CHANGE UP (ref 0–0.7)
EOSINOPHIL # BLD AUTO: 0.14 K/UL — SIGNIFICANT CHANGE UP (ref 0–0.7)
EOSINOPHIL NFR BLD AUTO: 1.1 % — SIGNIFICANT CHANGE UP (ref 0–8)
EOSINOPHIL NFR BLD AUTO: 1.1 % — SIGNIFICANT CHANGE UP (ref 0–8)
GLUCOSE SERPL-MCNC: 122 MG/DL — HIGH (ref 70–99)
GLUCOSE SERPL-MCNC: 122 MG/DL — HIGH (ref 70–99)
HBV SURFACE AB SER-ACNC: SIGNIFICANT CHANGE UP
HBV SURFACE AB SER-ACNC: SIGNIFICANT CHANGE UP
HCT VFR BLD CALC: 25 % — LOW (ref 42–52)
HCT VFR BLD CALC: 25 % — LOW (ref 42–52)
HCV RNA SPEC NAA+PROBE-LOG IU: SIGNIFICANT CHANGE UP IU/ML
HCV RNA SPEC NAA+PROBE-LOG IU: SIGNIFICANT CHANGE UP IU/ML
HCV RNA SPEC NAA+PROBE-LOG IU: SIGNIFICANT CHANGE UP LOGIU/ML
HCV RNA SPEC NAA+PROBE-LOG IU: SIGNIFICANT CHANGE UP LOGIU/ML
HGB BLD-MCNC: 8.6 G/DL — LOW (ref 14–18)
HGB BLD-MCNC: 8.6 G/DL — LOW (ref 14–18)
IMM GRANULOCYTES NFR BLD AUTO: 1.4 % — HIGH (ref 0.1–0.3)
IMM GRANULOCYTES NFR BLD AUTO: 1.4 % — HIGH (ref 0.1–0.3)
INR BLD: 2.26 RATIO — HIGH (ref 0.65–1.3)
INR BLD: 2.26 RATIO — HIGH (ref 0.65–1.3)
INR BLD: 2.33 RATIO — HIGH (ref 0.65–1.3)
INR BLD: 2.33 RATIO — HIGH (ref 0.65–1.3)
LYMPHOCYTES # BLD AUTO: 1.24 K/UL — SIGNIFICANT CHANGE UP (ref 1.2–3.4)
LYMPHOCYTES # BLD AUTO: 1.24 K/UL — SIGNIFICANT CHANGE UP (ref 1.2–3.4)
LYMPHOCYTES # BLD AUTO: 9.9 % — LOW (ref 20.5–51.1)
LYMPHOCYTES # BLD AUTO: 9.9 % — LOW (ref 20.5–51.1)
MAGNESIUM SERPL-MCNC: 2.6 MG/DL — HIGH (ref 1.8–2.4)
MAGNESIUM SERPL-MCNC: 2.6 MG/DL — HIGH (ref 1.8–2.4)
MCHC RBC-ENTMCNC: 34.4 G/DL — SIGNIFICANT CHANGE UP (ref 32–37)
MCHC RBC-ENTMCNC: 34.4 G/DL — SIGNIFICANT CHANGE UP (ref 32–37)
MCHC RBC-ENTMCNC: 35 PG — HIGH (ref 27–31)
MCHC RBC-ENTMCNC: 35 PG — HIGH (ref 27–31)
MCV RBC AUTO: 101.6 FL — HIGH (ref 80–94)
MCV RBC AUTO: 101.6 FL — HIGH (ref 80–94)
MITOCHONDRIA AB SER-ACNC: SIGNIFICANT CHANGE UP
MONOCYTES # BLD AUTO: 0.93 K/UL — HIGH (ref 0.1–0.6)
MONOCYTES # BLD AUTO: 0.93 K/UL — HIGH (ref 0.1–0.6)
MONOCYTES NFR BLD AUTO: 7.4 % — SIGNIFICANT CHANGE UP (ref 1.7–9.3)
MONOCYTES NFR BLD AUTO: 7.4 % — SIGNIFICANT CHANGE UP (ref 1.7–9.3)
NEUTROPHILS # BLD AUTO: 10 K/UL — HIGH (ref 1.4–6.5)
NEUTROPHILS # BLD AUTO: 10 K/UL — HIGH (ref 1.4–6.5)
NEUTROPHILS NFR BLD AUTO: 79.6 % — HIGH (ref 42.2–75.2)
NEUTROPHILS NFR BLD AUTO: 79.6 % — HIGH (ref 42.2–75.2)
NRBC # BLD: 0 /100 WBCS — SIGNIFICANT CHANGE UP (ref 0–0)
NRBC # BLD: 0 /100 WBCS — SIGNIFICANT CHANGE UP (ref 0–0)
PLATELET # BLD AUTO: 186 K/UL — SIGNIFICANT CHANGE UP (ref 130–400)
PLATELET # BLD AUTO: 186 K/UL — SIGNIFICANT CHANGE UP (ref 130–400)
PMV BLD: 9.5 FL — SIGNIFICANT CHANGE UP (ref 7.4–10.4)
PMV BLD: 9.5 FL — SIGNIFICANT CHANGE UP (ref 7.4–10.4)
POTASSIUM SERPL-MCNC: 3.7 MMOL/L — SIGNIFICANT CHANGE UP (ref 3.5–5)
POTASSIUM SERPL-MCNC: 3.7 MMOL/L — SIGNIFICANT CHANGE UP (ref 3.5–5)
POTASSIUM SERPL-SCNC: 3.7 MMOL/L — SIGNIFICANT CHANGE UP (ref 3.5–5)
POTASSIUM SERPL-SCNC: 3.7 MMOL/L — SIGNIFICANT CHANGE UP (ref 3.5–5)
PROT SERPL-MCNC: 5 G/DL — LOW (ref 6–8)
PROT SERPL-MCNC: 5 G/DL — LOW (ref 6–8)
PROTHROM AB SERPL-ACNC: 26 SEC — HIGH (ref 9.95–12.87)
PROTHROM AB SERPL-ACNC: 26 SEC — HIGH (ref 9.95–12.87)
PROTHROM AB SERPL-ACNC: 26.8 SEC — HIGH (ref 9.95–12.87)
PROTHROM AB SERPL-ACNC: 26.8 SEC — HIGH (ref 9.95–12.87)
RAPID RVP RESULT: SIGNIFICANT CHANGE UP
RAPID RVP RESULT: SIGNIFICANT CHANGE UP
RBC # BLD: 2.46 M/UL — LOW (ref 4.7–6.1)
RBC # BLD: 2.46 M/UL — LOW (ref 4.7–6.1)
RBC # FLD: 18.2 % — HIGH (ref 11.5–14.5)
RBC # FLD: 18.2 % — HIGH (ref 11.5–14.5)
SARS-COV-2 RNA SPEC QL NAA+PROBE: SIGNIFICANT CHANGE UP
SARS-COV-2 RNA SPEC QL NAA+PROBE: SIGNIFICANT CHANGE UP
SMOOTH MUSCLE AB SER-ACNC: SIGNIFICANT CHANGE UP
SMOOTH MUSCLE AB SER-ACNC: SIGNIFICANT CHANGE UP
SODIUM SERPL-SCNC: 128 MMOL/L — LOW (ref 135–146)
SODIUM SERPL-SCNC: 128 MMOL/L — LOW (ref 135–146)
WBC # BLD: 12.56 K/UL — HIGH (ref 4.8–10.8)
WBC # BLD: 12.56 K/UL — HIGH (ref 4.8–10.8)
WBC # FLD AUTO: 12.56 K/UL — HIGH (ref 4.8–10.8)
WBC # FLD AUTO: 12.56 K/UL — HIGH (ref 4.8–10.8)

## 2023-11-19 PROCEDURE — 99232 SBSQ HOSP IP/OBS MODERATE 35: CPT | Mod: GC

## 2023-11-19 PROCEDURE — 99233 SBSQ HOSP IP/OBS HIGH 50: CPT

## 2023-11-19 RX ORDER — OCTREOTIDE ACETATE 200 UG/ML
100 INJECTION, SOLUTION INTRAVENOUS; SUBCUTANEOUS THREE TIMES A DAY
Refills: 0 | Status: DISCONTINUED | OUTPATIENT
Start: 2023-11-19 | End: 2023-11-22

## 2023-11-19 RX ORDER — ALBUMIN HUMAN 25 %
100 VIAL (ML) INTRAVENOUS ONCE
Refills: 0 | Status: COMPLETED | OUTPATIENT
Start: 2023-11-19 | End: 2023-11-19

## 2023-11-19 RX ORDER — FUROSEMIDE 40 MG
40 TABLET ORAL DAILY
Refills: 0 | Status: DISCONTINUED | OUTPATIENT
Start: 2023-11-19 | End: 2023-11-19

## 2023-11-19 RX ORDER — MIDODRINE HYDROCHLORIDE 2.5 MG/1
5 TABLET ORAL THREE TIMES A DAY
Refills: 0 | Status: DISCONTINUED | OUTPATIENT
Start: 2023-11-19 | End: 2023-11-22

## 2023-11-19 RX ORDER — HEPATITIS B VIRUS VACCINE,RECB 20 MCG/ML
20 VIAL (ML) INTRAMUSCULAR ONCE
Refills: 0 | Status: DISCONTINUED | OUTPATIENT
Start: 2023-11-19 | End: 2023-11-22

## 2023-11-19 RX ORDER — OCTREOTIDE ACETATE 200 UG/ML
100 INJECTION, SOLUTION INTRAVENOUS; SUBCUTANEOUS THREE TIMES A DAY
Refills: 0 | Status: DISCONTINUED | OUTPATIENT
Start: 2023-11-19 | End: 2023-11-19

## 2023-11-19 RX ORDER — ALBUMIN HUMAN 25 %
80 VIAL (ML) INTRAVENOUS ONCE
Refills: 0 | Status: DISCONTINUED | OUTPATIENT
Start: 2023-11-19 | End: 2023-11-19

## 2023-11-19 RX ADMIN — LACTULOSE 20 GRAM(S): 10 SOLUTION ORAL at 22:44

## 2023-11-19 RX ADMIN — CHLORHEXIDINE GLUCONATE 1 APPLICATION(S): 213 SOLUTION TOPICAL at 12:40

## 2023-11-19 RX ADMIN — OCTREOTIDE ACETATE 100 MICROGRAM(S): 200 INJECTION, SOLUTION INTRAVENOUS; SUBCUTANEOUS at 15:03

## 2023-11-19 RX ADMIN — MIDODRINE HYDROCHLORIDE 5 MILLIGRAM(S): 2.5 TABLET ORAL at 19:35

## 2023-11-19 RX ADMIN — LACTULOSE 20 GRAM(S): 10 SOLUTION ORAL at 05:24

## 2023-11-19 RX ADMIN — Medication 50 MILLILITER(S): at 22:45

## 2023-11-19 RX ADMIN — Medication 100 MILLIGRAM(S): at 13:37

## 2023-11-19 RX ADMIN — CEFTRIAXONE 100 MILLIGRAM(S): 500 INJECTION, POWDER, FOR SOLUTION INTRAMUSCULAR; INTRAVENOUS at 04:00

## 2023-11-19 RX ADMIN — LACTULOSE 20 GRAM(S): 10 SOLUTION ORAL at 12:39

## 2023-11-19 RX ADMIN — Medication 100 MILLIGRAM(S): at 05:25

## 2023-11-19 RX ADMIN — PANTOPRAZOLE SODIUM 40 MILLIGRAM(S): 20 TABLET, DELAYED RELEASE ORAL at 05:24

## 2023-11-19 NOTE — PROGRESS NOTE ADULT - SUBJECTIVE AND OBJECTIVE BOX
Gastroenterology progress note:     Patient is a 28y old  Male who presents with a chief complaint of cholestasis, abdominal ascites, hyponatremia (19 Nov 2023 12:42)       Admitted on: 11-16-23    We are following the patient for: decompensated liver cirrhosis       Interval History: Na better, Cr stabilized        PAST MEDICAL & SURGICAL HISTORY:  No significant past surgical history          MEDICATIONS  (STANDING):  cefTRIAXone   IVPB 1000 milliGRAM(s) IV Intermittent every 24 hours  chlorhexidine 2% Cloths 1 Application(s) Topical daily  influenza   Vaccine 0.5 milliLiter(s) IntraMuscular once  lactulose Syrup 20 Gram(s) Oral three times a day  metroNIDAZOLE  IVPB      metroNIDAZOLE  IVPB 500 milliGRAM(s) IV Intermittent every 8 hours  midodrine. 5 milliGRAM(s) Oral three times a day  pantoprazole    Tablet 40 milliGRAM(s) Oral before breakfast    MEDICATIONS  (PRN):  LORazepam   Injectable 2 milliGRAM(s) IntraMuscular every 2 hours PRN Symptom-triggered: 2 point increase in CIWA -Ar score and a total score of 7 or LESS  traMADol 25 milliGRAM(s) Oral every 8 hours PRN Severe Pain (7 - 10)      Allergies  No Known Allergies      Review of Systems:   Cardiovascular:  No Chest Pain, No Palpitations  Respiratory:  No Cough, No Dyspnea  Gastrointestinal:  As described in HPI  Skin:  No Skin Lesions, No Jaundice  Neuro:  No Syncope, No Dizziness    Physical Examination:  T(C): 36.8 (11-19-23 @ 08:00), Max: 37.4 (11-19-23 @ 00:00)  HR: 109 (11-19-23 @ 08:00) (85 - 109)  BP: 103/60 (11-19-23 @ 08:00) (94/51 - 111/55)  RR: 20 (11-19-23 @ 08:00) (20 - 20)  SpO2: 99% (11-19-23 @ 08:00) (95% - 99%)      11-18-23 @ 07:01  -  11-19-23 @ 07:00  --------------------------------------------------------  IN: 570 mL / OUT: 0 mL / NET: 570 mL        GENERAL: AAOx3, no acute distress.  HEAD:  Atraumatic, Normocephalic  EYES: conjunctiva and sclera clear  NECK: Supple, no JVD or thyromegaly  CHEST/LUNG: Clear to auscultation bilaterally; No wheeze, rhonchi, or rales  HEART: Regular rate and rhythm; normal S1, S2, No murmurs.  ABDOMEN: Soft, nontender, nondistended; Bowel sounds present  NEUROLOGY: No asterixis or tremor.   SKIN: Intact, jaundice +     Data:                        8.6    12.56 )-----------( 186      ( 19 Nov 2023 05:10 )             25.0     Hgb trend:  8.6  11-19-23 @ 05:10  8.7  11-18-23 @ 07:04  9.6  11-17-23 @ 05:00      11-18-23 @ 07:01  -  11-19-23 @ 07:00  --------------------------------------------------------  IN: 300 mL      11-19    128<L>  |  94<L>  |  27<H>  ----------------------------<  122<H>  3.7   |  17  |  1.3    Ca    7.7<L>      19 Nov 2023 05:10  Mg     2.6     11-19    TPro  5.0<L>  /  Alb  3.5  /  TBili  32.6<HH>  /  DBili  x   /  AST  105<H>  /  ALT  11  /  AlkPhos  110  11-19    Liver panel trend:  TBili 32.6   /      /   ALT 11   /   AlkP 110   /   Tptn 5.0   /   Alb 3.5    /   DBili --      11-19  TBili 33.0   /      /   ALT 10   /   AlkP 107   /   Tptn 5.2   /   Alb 3.7    /   DBili 25.6      11-18  TBili 31.5   /      /   ALT 12   /   AlkP 123   /   Tptn 4.9   /   Alb 3.1    /   DBili --      11-18  TBili 30.5   /      /   ALT 14   /   AlkP 143   /   Tptn 5.0   /   Alb 2.7    /   DBili >17.2      11-17  TBili 34.3   /   AST --   /   ALT --   /   AlkP --   /   Tptn --   /   Alb --    /   DBili --      11-17  TBili 30.6   /      /   ALT 13   /   AlkP 150   /   Tptn 5.0   /   Alb 2.7    /   DBili 20.1      11-17  TBili 29.9   /      /   ALT 14   /   AlkP 147   /   Tptn 5.0   /   Alb 2.5    /   DBili 20.3      11-17  TBili 29.2   /      /   ALT 14   /   AlkP 165   /   Tptn 5.1   /   Alb 2.4    /   DBili >17.2      11-16  TBili 29.6   /      /   ALT 15   /   AlkP 162   /   Tptn 5.3   /   Alb 2.5    /   DBili >17.2      11-16  TBili 31.1   /      /   ALT 15   /   AlkP 175   /   Tptn 5.5   /   Alb 2.7    /   DBili >17.2      11-15      PT/INR - ( 19 Nov 2023 05:10 )   PT: 26.80 sec;   INR: 2.33 ratio         PTT - ( 19 Nov 2023 05:10 )  PTT:38.8 sec       Radiology:

## 2023-11-19 NOTE — PROGRESS NOTE ADULT - SUBJECTIVE AND OBJECTIVE BOX
SARABIAWILLIAM  28y Male    CHIEF COMPLAINT:    Patient is a 28y old  Male who presents with a chief complaint of cholestasis, abdominal ascites, hyponatremia (18 Nov 2023 09:49)      INTERVAL HPI/OVERNIGHT EVENTS:    Patient seen and examined. No acute events overnight. Mental status intact     ROS: All other systems are negative.    Vital Signs:    T(F): 98.2 (11-19-23 @ 08:00), Max: 100.5 (11-18-23 @ 12:07)  HR: 109 (11-19-23 @ 08:00) (85 - 109)  BP: 103/60 (11-19-23 @ 08:00) (94/51 - 111/55)  RR: 20 (11-19-23 @ 08:00) (20 - 20)  SpO2: 99% (11-19-23 @ 08:00) (95% - 99%)    18 Nov 2023 07:01  -  19 Nov 2023 07:00  --------------------------------------------------------  IN: 570 mL / OUT: 0 mL / NET: 570 mL    PHYSICAL EXAM:    GENERAL:  NAD  SKIN: No rashes or lesions jaundiced  HEENT: Atraumatic. Normocephalic.   NECK: Supple, No JVD.    PULMONARY: poor inspiratory effort. CTA B/L. No wheezing. No rales  CVS: Normal S1, S2. Rate and Rhythm are regular.   ABDOMEN/GI: Soft, Nontender, distended; BS present  MSK:  No clubbing or cyanosis   NEUROLOGIC: moves all extremities   PSYCH: Alert & oriented x 3, normal affect    Consultant(s) Notes Reviewed:  [x ] YES  [ ] NO  Care Discussed with Consultants/Other Providers [ x] YES  [ ] NO    LABS:                        8.6    12.56 )-----------( 186      ( 19 Nov 2023 05:10 )             25.0     128<L>  |  94<L>  |  27<H>  ----------------------------<  122<H>  3.7   |  17  |  1.3    Ca    7.7<L>      19 Nov 2023 05:10  Mg     2.6     11-19    TPro  5.0<L>  /  Alb  3.5  /  TBili  32.6<HH>  /  DBili  x   /  AST  105<H>  /  ALT  11  /  AlkPhos  110  11-19    PT/INR - ( 19 Nov 2023 05:10 )   PT: 26.80 sec;   INR: 2.33 ratio       PTT - ( 19 Nov 2023 05:10 )  PTT:38.8 sec    Culture - Fungal, Body Fluid (collected 16 Nov 2023 12:27)  Source: Peritoneal Peritoneal Fluid  Preliminary Report (18 Nov 2023 15:03):    Culture is being performed. Fungal cultures are held for 4 weeks.    Culture - Body Fluid with Gram Stain (collected 16 Nov 2023 12:27)  Source: Abdominal Fl Abdominal Fluid  Gram Stain (17 Nov 2023 01:13):    No polymorphonuclear leukocytes seen    No organisms seen    by cytocentrifuge  Preliminary Report (17 Nov 2023 19:54):    No growth to date.    RADIOLOGY & ADDITIONAL TESTS:  Imaging or report Personally Reviewed:  [x] YES  [ ] NO  EKG reviewed: [x] YES  [ ] NO    Medications:  Standing  cefTRIAXone   IVPB 1000 milliGRAM(s) IV Intermittent every 24 hours  chlorhexidine 2% Cloths 1 Application(s) Topical daily  influenza   Vaccine 0.5 milliLiter(s) IntraMuscular once  lactulose Syrup 20 Gram(s) Oral three times a day  metroNIDAZOLE  IVPB      metroNIDAZOLE  IVPB 500 milliGRAM(s) IV Intermittent every 8 hours  pantoprazole    Tablet 40 milliGRAM(s) Oral before breakfast  rifAXIMin 550 milliGRAM(s) Oral two times a day    PRN Meds  LORazepam   Injectable 2 milliGRAM(s) IntraMuscular every 2 hours PRN  traMADol 25 milliGRAM(s) Oral every 8 hours PRN

## 2023-11-19 NOTE — PROGRESS NOTE ADULT - ASSESSMENT
29 yo male previously healthy presented today for worsening jaundice and abdominal distention   that started 2 weeks ago. patient was admitted for etoh hepatitis.    # Etoh hepatitis MDF > 32  # ascites s/p paracentesis  # R/O etoh cirrhosis. varices in CT scan   # YANDY   # Coagulopathy   No encephalopathy   MELD3: 32 11/18. 11/19  MELD-Na: 34 11/18.11/19    - CT abd pelv: Diffuse large amount of abdominal and pelvic ascites. Hepatosplenomegaly with right paracolic varices, findings suspicious for portal hypertension.   - RUQ US : 1.  Enlarged fatty infiltrated liver (hepatomegaly with diffuse steatosis). Ascites is noted.  T dylon 32.6       ALT 11  11-19-23 @ 05:10  T dylon 33.0       ALT 10  11-18-23 @ 21:35  T dylon 31.5       ALT 12  11-18-23 @ 07:04  T dylon 30.5       ALT 14  11-17-23 @ 19:44  T dylon 34.3   AP --  AST --  ALT --  11-17-23 @ 18:26       Hepatitis A IgM, Hepatitis B core IgM, core Ab total, surface Ag, surface Ab, HCV antibody, HCV RNA  - negative  GIOVANY, AMA, anti-Smooth Muscle Ab type 1, Anti liver-kidney microsomal Ab, Anti-soluble liver Ag, immunoglobulin panel,  Quantiferon level, Iron studies and ceruloplasmin, CMV PCR, EBV PCR, HSV IgM  - pending  - s/p 3.2 L paracentesis , SBP ruled out    PLAN   Albumin 20g today  STart Midodrine 5mg TID, octreotide 100mg TID subq  Stop rifaximin  Can dc antibiotics course after 5 day course  Please send CMV IgM, CMV IgG, CMV PCR, Hep E IgG, HSA Ab, HIV, Syphilis scree, toxoplasma Ab. measles Ab, Mumps Ab, Rubella AB, Hep A total IgG, PeTH level, Utox and serum drug screen  Please give Hepatitis B vaccine as pt non immune  IV Vitamin K 10 mg QD for three days   INR and LFT Q12   Please avoid hepatotoxic medications  Will start Eplerenone  and Lasix before discharge. Held secondary to YANDY  Please notify hepatology with any change on mental status or worsening INR for immediate LT center transfer  Hold prednisone for now  Offered transfer to LT center but opted to be monitored here.  If encephalopathy or worsening coagulopathy will need urgent transfer .    # HCC screening:  - Please f/u as outpatient for US abdomen and AFP every 6 months.  - outpatient EGD for variceal screening    # Lifestyle/Dietary modifications  - Calorie intake 25-40 Kcal/kg/day  - Protein intake: 1.2-1.5 gm/kg/day  - Avoid smoking, alcohol, NSAIDS.  - Abstain from alcohol and all illicit drugs  -Avoid use of herbal and dietary supplements due to potential hepatotoxicity  -Limit use of acetaminophen to <2 grams per day  -Avoid use of nonsteroidal antiinflammatory drugs (NSAIDs)    -Avoid eating any unpasteurized dairy products; avoid eating any raw or undercooked eggs, fish, poultry, or meat; and avoid eating raw/steamed oysters or other shellfish to avoid risk of Vibrio infection.    #Vaccinations:  Please f/u in clinic for being uptodate with HAV/HBV/Influenza/Pneumococcal vaccines.    #LT evaluation  Social status: Lives with mother and brother. Brother volunteered to be a donor. 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

## 2023-11-19 NOTE — PROGRESS NOTE ADULT - ASSESSMENT
29 yo male previously healthy presented today for worsening jaundice and abdominal distention that started 2 weeks ago. Since his jaundice started, the patient began drinking a lot of fluids minimum 4L / day, his abdominal distention kept getting worse and his urine became darker, with oliguria. He reports SOB attributed to his abdomen pushing up on his diaphragm.      Acute liver failure unclear etiology but suspected ETOH use  Elevated INR  used to be a daily 4 beer/day drinker with episodes of binge drinking, stopped 1 month ago  - CT abd pelvis:   a) Diffuse large amount of abdominal and pelvic ascites. Hepatosplenomegaly with right paracolic varices, findings suspicious for portal hypertension. Diffuse colonic wall thickening, possibly related to a diffuse colitis versus this severely edematous state of the patient. Nonspecific dilated loops of small bowel without definite evidence of obstruction.   b) Partial collapse of the right lower lobe, left lower lobe and right middle lobe.  - RUQ US :   a)  Enlarged fatty infiltrated liver (hepatomegaly with diffuse steatosis).  b) Sludge within the gallbladder lumen which has a thickened edematous wall. No echogenic stones. Negative sonographic Carter's sign. Thickened edematous gallbladder wall is a nonspecific finding possibly related to volume status in this patient.   c)  Ascites is noted.  - s/p paracentesis 11/16 with 3.2 L removed, no evidence of SBP  - hepatology following, pending labs requested  - s/p Albumin, Vit K. Continue with lactulose. Monitor Mental status  - monitor LFts/INR q8H  - hepatology following, patient continues to refuse LT center transfer    Severe Subacute hyponatremia - likely hypervolemic 2/2 increased free water intake slowly correcting   YANDY  patient asymptomatic   - BMP monitoring Q8H  - fluid restriction, avoid IVF  - Nephrology following, start midodrine 5 q8H    Normocytic anemia with no signs of bleed, monitor    Overall prognosis is guarded, if worsening mental status or coagulopathy, will need urgent transfer to tplant center, hepatology on board     Patient seen at bedside, total time spent to evaluate and treat the patient's acute illness and chronic medical conditions as well as time spent reviewing labs, radiology, discussing medical plan with covering medical team was more than 50 minutes with >50% of time spent face to face with patient, discussing with patient/family as well as coordination of care

## 2023-11-19 NOTE — PROGRESS NOTE ADULT - ASSESSMENT
IMPRESSION:    Hyponatremia in setting of Liver Disease   Portal Hypertension/Hepatomegaly/Right Paracolic Varices/Alcoholic Steatosis / cirrhosis/ liver failure  Hepatorenal Syndrome  Possible Diffuse Colitis  Transaminitis  Abdominal/Pelvic Ascites  Suspected thiamine / foalte def    PLAN:    CNS: mental status at baseline, thiamine, folate, CIWA monitoring, check ammonia level    HEENT: Oral care    PULMONARY:  HOB @ 45 degrees.  Aspiration precautions. on room air, incentive spirometry    CARDIOVASCULAR: avoid overload    GI: GI prophylaxis.  s/p paracentesis 3.2 L, Hepatology recs appreciated, s/p albumin challenge, c/w bowel regimen. Hepatology recs appreciated start on diuresis     RENAL:  Follow up lytes.  Correct as needed,     INFECTIOUS DISEASE: abx    HEMATOLOGICAL:  DVT prophylaxis. maintain active type and screen, transfuse if Hgb<7.0    ENDOCRINE:  Follow up FS.  Insulin protocol if needed.    MUSCULOSKELETAL: activity as tolerated     SDU monitoring IMPRESSION:      Portal Hypertension/Hepatomegaly/Right Paracolic Varices/Alcoholic Steatosis / cirrhosis/ liver failure  Hyponatremia in setting of Liver Disease   Hepatorenal Syndrome  Possible Diffuse Colitis  Transaminitis  Abdominal/Pelvic Ascites      PLAN:    CNS: mental status at baseline, thiamine, folate, CIWA monitoring    HEENT: Oral care    PULMONARY:  HOB @ 45 degrees.  Aspiration precautions. on room air, incentive spirometry    CARDIOVASCULAR: avoid overload    GI: GI prophylaxis.  s/p paracentesis 3.2 L, Hepatology recs appreciated, s/p albumin challenge, c/w bowel regimen. Hepatology recs appreciated start on diuresis     RENAL:  Follow up lytes.  Correct as needed,     INFECTIOUS DISEASE: abx    HEMATOLOGICAL:  DVT prophylaxis. maintain active type and screen, transfuse if Hgb<7.0    ENDOCRINE:  Follow up FS.  Insulin protocol if needed.    MUSCULOSKELETAL: activity as tolerated     SDU monitoring

## 2023-11-19 NOTE — PROGRESS NOTE ADULT - ASSESSMENT
Patient is a 28y Male with no known pmhx presented to ED on 11/15 for worsening jaundice and abdominal distension x 3 weeks with dark urine and decreased urination x 4 days , reports drinking fluids minimum 4L / day to try and urinate    # YANDY, non oliguric / HRS/ATN iso hyperbilirubinemia  # Hyponatremia   # Hepatitis   - creat noted stable   - BP noted, cont midodrine  - sodium trending up / cont fluid restriction  - sp Albumin   - serum sodium better / U Na noted low and U osm elevated c/w ADH presence   -  followed by hepatology    Patient is a 28y Male with no known pmhx presented to ED on 11/15 for worsening jaundice and abdominal distension x 3 weeks with dark urine and decreased urination x 4 days , reports drinking fluids minimum 4L / day to try and urinate    # YANDY, non oliguric / HRS/ATN iso hyperbilirubinemia  # Hyponatremia   # Hepatitis / alcoholic   - creat noted stable   - BP noted, cont midodrine / s/p albumin  - serum sodium better / U Na noted low and U osm elevated c/w ADH presence / cont fluid restriction  - check blood gas pH before starting sodium bicarb  - s/p paracentesis  -  followed by hepatology   Nephrology signing off.  Recall as needed

## 2023-11-19 NOTE — PROGRESS NOTE ADULT - SUBJECTIVE AND OBJECTIVE BOX
Nephrology Progress Note    WILLIAM SARABIA  MRN-[id number]  28y  Male    S:  Patient is seen and examined, events over the last 24h noted.    O:  Allergies:  No Known Allergies    Hospital Medications:   MEDICATIONS  (STANDING):  cefTRIAXone   IVPB 1000 milliGRAM(s) IV Intermittent every 24 hours  chlorhexidine 2% Cloths 1 Application(s) Topical daily  influenza   Vaccine 0.5 milliLiter(s) IntraMuscular once  lactulose Syrup 20 Gram(s) Oral three times a day  metroNIDAZOLE  IVPB      metroNIDAZOLE  IVPB 500 milliGRAM(s) IV Intermittent every 8 hours  midodrine. 5 milliGRAM(s) Oral three times a day  pantoprazole    Tablet 40 milliGRAM(s) Oral before breakfast    MEDICATIONS  (PRN):  LORazepam   Injectable 2 milliGRAM(s) IntraMuscular every 2 hours PRN Symptom-triggered: 2 point increase in CIWA -Ar score and a total score of 7 or LESS  traMADol 25 milliGRAM(s) Oral every 8 hours PRN Severe Pain (7 - 10)    Home Medications:      VITALS:  Daily     Daily   T(F): 98.2 (11-19-23 @ 08:00), Max: 100.5 (11-18-23 @ 12:07)  HR: 109 (11-19-23 @ 08:00)  BP: 103/60 (11-19-23 @ 08:00)  RR: 20 (11-19-23 @ 08:00)  SpO2: 99% (11-19-23 @ 08:00)  Wt(kg): --  I&O's Detail    18 Nov 2023 07:01  -  19 Nov 2023 07:00  --------------------------------------------------------  IN:    Albumin 25%  -  50 mL: 300 mL    IV PiggyBack: 100 mL    IV PiggyBack: 50 mL    Oral Fluid: 120 mL  Total IN: 570 mL    OUT:  Total OUT: 0 mL    Total NET: 570 mL        I&O's Summary    18 Nov 2023 07:01  -  19 Nov 2023 07:00  --------------------------------------------------------  IN: 570 mL / OUT: 0 mL / NET: 570 mL          PHYSICAL EXAM:  Gen: NAD  Chest: b/l breath sounds  Abd: soft  Extremities: no edema  Vascular access:       LABS:      11-19    128<L>  |  94<L>  |  27<H>  ----------------------------<  122<H>  3.7   |  17  |  1.3    Ca    7.7<L>      19 Nov 2023 05:10  Mg     2.6     11-19    TPro  5.0<L>  /  Alb  3.5  /  TBili  32.6<HH>  /  DBili      /  AST  105<H>  /  ALT  11  /  AlkPhos  110  11-19    eGFR: 76 mL/min/1.73m2 (11-19-23 @ 05:10)  eGFR: 76 mL/min/1.73m2 (11-18-23 @ 18:04)    Phosphorus: 3.6 mg/dL (11-17-23 @ 05:00)  Phosphorus: 3.1 mg/dL (11-16-23 @ 11:26)    Osmolality, Serum: 264 mos/kg (11-16-23 @ 11:26)                          8.6    12.56 )-----------( 186      ( 19 Nov 2023 05:10 )             25.0     Mean Cell Volume: 101.6 fL (11-19-23 @ 05:10)    Ferritin: 1110 ng/mL (11-18-23 @ 07:04)  Iron Total: 88 ug/dL (11-18-23 @ 07:00)        Urine Studies:  Protein, Urine: 30 mg/dL (11-16-23 @ 07:54)  White Blood Cell - Urine: 2 /HPF (11-16-23 @ 07:54)  Red Blood Cell - Urine: 4 /HPF (11-16-23 @ 07:54)  Protein, Urine: 30 mg/dL (11-15-23 @ 23:00)  White Blood Cell - Urine: 0 /HPF (11-15-23 @ 23:00)  Red Blood Cell - Urine: 6 /HPF (11-15-23 @ 23:00)      Urea Nitrogen,  Random Urine: 888 mg/dL (11-16-23 @ 17:58)    Sodium, Random Urine: 20.0 mmoL/L (11-16 @ 17:58)  Creatinine, Random Urine: 127 mg/dL (11-16 @ 17:58)    Sodium: 128 mmol/L (11-19-23 @ 05:10)  Sodium: 130 mmol/L (11-18-23 @ 18:04)  Sodium: 125 mmol/L (11-18-23 @ 07:04)  Sodium: 126 mmol/L (11-17-23 @ 18:26)  Sodium: 124 mmol/L (11-17-23 @ 05:00)  Sodium: 123 mmol/L (11-17-23 @ 00:23)  Sodium: 125 mmol/L (11-16-23 @ 22:03)  Sodium: 124 mmol/L (11-16-23 @ 16:34)  Sodium: 122 mmol/L (11-16-23 @ 11:26)  Sodium: 122 mmol/L (11-16-23 @ 06:59)  Sodium: 119 mmol/L (11-15-23 @ 20:43)    Osmolality, Random Urine: 616 mos/kg [50 - 1200] (11-16-23 @ 07:54)    Sodium, Random Urine: 20.0 mmoL/L (11-16-23 @ 17:58)  Osmolality, Serum: 264 mos/kg [275 - 300] (11-16-23 @ 11:26)    Thyroid Stimulating Hormone, Serum: 1.96 uIU/mL [0.27 - 4.20] (11-16-23 @ 11:26)        Creatinine trend:  Creatinine: 1.3 mg/dL (11-19-23 @ 05:10)  Creatinine: 1.3 mg/dL (11-18-23 @ 18:04)  Creatinine: 1.2 mg/dL (11-18-23 @ 07:04)  Creatinine: 1.3 mg/dL (11-17-23 @ 18:26)  Creatinine: 1.2 mg/dL (11-17-23 @ 05:00)  Creatinine: 1.2 mg/dL (11-17-23 @ 00:23)  Creatinine: 1.0 mg/dL (11-16-23 @ 22:03)  Creatinine: 1.0 mg/dL (11-16-23 @ 16:34)  Creatinine: 1.0 mg/dL (11-16-23 @ 11:26)  Creatinine: 1.2 mg/dL (11-16-23 @ 06:59)  Creatinine: 0.7 mg/dL (11-15-23 @ 20:43)    Hepatitis B Surface Antigen: Nonreact (11-18-23 @ 07:04)  Hepatitis C Virus S/CO Ratio: 0.06 S/CO (11-18-23 @ 07:04)  Hepatitis B Core IgM Antibody: Nonreact (11-18-23 @ 07:04)         Nephrology Progress Note    WILLIAM SARABIA  MRN-[id number]  28y  Male    S:  Patient is seen and examined, events over the last 24h noted.    O:  Allergies:  No Known Allergies    Hospital Medications:   MEDICATIONS  (STANDING):  cefTRIAXone   IVPB 1000 milliGRAM(s) IV Intermittent every 24 hours  chlorhexidine 2% Cloths 1 Application(s) Topical daily  influenza   Vaccine 0.5 milliLiter(s) IntraMuscular once  lactulose Syrup 20 Gram(s) Oral three times a day  metroNIDAZOLE  IVPB 500 milliGRAM(s) IV Intermittent every 8 hours  midodrine. 5 milliGRAM(s) Oral three times a day  pantoprazole    Tablet 40 milliGRAM(s) Oral before breakfast    MEDICATIONS  (PRN):  LORazepam   Injectable 2 milliGRAM(s) IntraMuscular every 2 hours PRN Symptom-triggered: 2 point increase in CIWA -Ar score and a total score of 7 or LESS  traMADol 25 milliGRAM(s) Oral every 8 hours PRN Severe Pain (7 - 10)    Home Medications:      VITALS:  T(F): 98.2 (11-19-23 @ 08:00), Max: 100.5 (11-18-23 @ 12:07)  HR: 109 (11-19-23 @ 08:00)  BP: 103/60 (11-19-23 @ 08:00)  RR: 20 (11-19-23 @ 08:00)  SpO2: 99% (11-19-23 @ 08:00)  Wt(kg): --  I&O's Detail    18 Nov 2023 07:01  -  19 Nov 2023 07:00  --------------------------------------------------------  IN:    Albumin 25%  -  50 mL: 300 mL    IV PiggyBack: 100 mL    IV PiggyBack: 50 mL    Oral Fluid: 120 mL  Total IN: 570 mL    OUT:  Total OUT: 0 mL    Total NET: 570 mL        I&O's Summary    18 Nov 2023 07:01  -  19 Nov 2023 07:00  --------------------------------------------------------  IN: 570 mL / OUT: 0 mL / NET: 570 mL          PHYSICAL EXAM:  Gen: NAD, +jaundice      LABS:      11-19    128<L>  |  94<L>  |  27<H>  ----------------------------<  122<H>  3.7   |  17  |  1.3    Ca    7.7<L>      19 Nov 2023 05:10  Mg     2.6     11-19    TPro  5.0<L>  /  Alb  3.5  /  TBili  32.6<HH>  /  DBili      /  AST  105<H>  /  ALT  11  /  AlkPhos  110  11-19    Phosphorus: 3.6 mg/dL (11-17-23 @ 05:00)  Phosphorus: 3.1 mg/dL (11-16-23 @ 11:26)                          8.6    12.56 )-----------( 186      ( 19 Nov 2023 05:10 )             25.0     Mean Cell Volume: 101.6 fL (11-19-23 @ 05:10)    Ferritin: 1110 ng/mL (11-18-23 @ 07:04)  Iron Total: 88 ug/dL (11-18-23 @ 07:00)        Urine Studies:  Protein, Urine: 30 mg/dL (11-16-23 @ 07:54)  White Blood Cell - Urine: 2 /HPF (11-16-23 @ 07:54)  Red Blood Cell - Urine: 4 /HPF (11-16-23 @ 07:54)  Protein, Urine: 30 mg/dL (11-15-23 @ 23:00)  White Blood Cell - Urine: 0 /HPF (11-15-23 @ 23:00)  Red Blood Cell - Urine: 6 /HPF (11-15-23 @ 23:00)      Urea Nitrogen,  Random Urine: 888 mg/dL (11-16-23 @ 17:58)    Sodium, Random Urine: 20.0 mmoL/L (11-16 @ 17:58)  Creatinine, Random Urine: 127 mg/dL (11-16 @ 17:58)    Sodium: 128 mmol/L (11-19-23 @ 05:10)  Sodium: 130 mmol/L (11-18-23 @ 18:04)  Sodium: 125 mmol/L (11-18-23 @ 07:04)  Sodium: 126 mmol/L (11-17-23 @ 18:26)  Sodium: 124 mmol/L (11-17-23 @ 05:00)  Sodium: 123 mmol/L (11-17-23 @ 00:23)  Sodium: 125 mmol/L (11-16-23 @ 22:03)  Sodium: 124 mmol/L (11-16-23 @ 16:34)  Sodium: 122 mmol/L (11-16-23 @ 11:26)  Sodium: 122 mmol/L (11-16-23 @ 06:59)  Sodium: 119 mmol/L (11-15-23 @ 20:43)      Osmolality, Random Urine: 616 mos/kg [50 - 1200] (11-16-23 @ 07:54)    Sodium, Random Urine: 20.0 mmoL/L (11-16-23 @ 17:58)  Osmolality, Serum: 264 mos/kg [275 - 300] (11-16-23 @ 11:26)    Thyroid Stimulating Hormone, Serum: 1.96 uIU/mL [0.27 - 4.20] (11-16-23 @ 11:26)        Creatinine trend:  Creatinine: 1.3 mg/dL (11-19-23 @ 05:10)  Creatinine: 1.3 mg/dL (11-18-23 @ 18:04)  Creatinine: 1.2 mg/dL (11-18-23 @ 07:04)  Creatinine: 1.3 mg/dL (11-17-23 @ 18:26)  Creatinine: 1.2 mg/dL (11-17-23 @ 05:00)  Creatinine: 1.2 mg/dL (11-17-23 @ 00:23)

## 2023-11-19 NOTE — PROGRESS NOTE ADULT - SUBJECTIVE AND OBJECTIVE BOX
Patient is a 28y old  Male who presents with a chief complaint of cholestasis, abdominal ascites, hyponatremia (18 Nov 2023 09:49)        Over Night Events: no overnight events     ROS:     All ROS are negative except HPI     PHYSICAL EXAM    ICU Vital Signs Last 24 Hrs  T(C): 36.8 (19 Nov 2023 08:00), Max: 38.1 (18 Nov 2023 12:07)  T(F): 98.2 (19 Nov 2023 08:00), Max: 100.5 (18 Nov 2023 12:07)  HR: 109 (19 Nov 2023 08:00) (85 - 109)  BP: 103/60 (19 Nov 2023 08:00) (94/51 - 111/55)  BP(mean): 76 (19 Nov 2023 08:00) (75 - 76)  ABP: --  ABP(mean): --  RR: 20 (19 Nov 2023 08:00) (20 - 20)  SpO2: 99% (19 Nov 2023 08:00) (95% - 99%)    O2 Parameters below as of 19 Nov 2023 08:00  Patient On (Oxygen Delivery Method): room air        CONSTITUTIONAL:  NAD    CARDIAC:   Normal rate,   Regular rhythm.    No edema    RESPIRATORY:   No wheezing  Bilateral BS  Normal chest expansion  Not tachypneic,  No use of accessory muscles    GASTROINTESTINAL:  Abdomen soft,   Non-tender,   No guarding,     MUSCULOSKELETAL:   Range of motion is not limited,  No clubbing, cyanosis    NEUROLOGICAL:   Alert and oriented   No motor  deficits    11-18-23 @ 07:01  -  11-19-23 @ 07:00  --------------------------------------------------------  IN:    Albumin 25%  -  50 mL: 300 mL    IV PiggyBack: 100 mL    IV PiggyBack: 50 mL    Oral Fluid: 120 mL  Total IN: 570 mL    OUT:  Total OUT: 0 mL    Total NET: 570 mL      LABS:                          8.6    12.56 )-----------( 186      ( 19 Nov 2023 05:10 )             25.0                                               11-19    128<L>  |  94<L>  |  27<H>  ----------------------------<  122<H>  3.7   |  17  |  1.3    Ca    7.7<L>      19 Nov 2023 05:10  Mg     2.6     11-19    TPro  5.0<L>  /  Alb  3.5  /  TBili  32.6<HH>  /  DBili  x   /  AST  105<H>  /  ALT  11  /  AlkPhos  110  11-19      PT/INR - ( 19 Nov 2023 05:10 )   PT: 26.80 sec;   INR: 2.33 ratio       PTT - ( 19 Nov 2023 05:10 )  PTT:38.8 sec                                       Urinalysis Basic - ( 19 Nov 2023 05:10 )    Color: x / Appearance: x / SG: x / pH: x  Gluc: 122 mg/dL / Ketone: x  / Bili: x / Urobili: x   Blood: x / Protein: x / Nitrite: x   Leuk Esterase: x / RBC: x / WBC x   Sq Epi: x / Non Sq Epi: x / Bacteria: x                                                  LIVER FUNCTIONS - ( 19 Nov 2023 05:10 )  Alb: 3.5 g/dL / Pro: 5.0 g/dL / ALK PHOS: 110 U/L / ALT: 11 U/L / AST: 105 U/L / GGT: x                                                  Culture - Fungal, Body Fluid (collected 16 Nov 2023 12:27)  Source: Peritoneal Peritoneal Fluid  Preliminary Report (18 Nov 2023 15:03):    Culture is being performed. Fungal cultures are held for 4 weeks.    Culture - Body Fluid with Gram Stain (collected 16 Nov 2023 12:27)  Source: Abdominal Fl Abdominal Fluid  Gram Stain (17 Nov 2023 01:13):    No polymorphonuclear leukocytes seen    No organisms seen    by cytocentrifuge  Preliminary Report (17 Nov 2023 19:54):    No growth to date.                                                                                           MEDICATIONS  (STANDING):  cefTRIAXone   IVPB 1000 milliGRAM(s) IV Intermittent every 24 hours  chlorhexidine 2% Cloths 1 Application(s) Topical daily  influenza   Vaccine 0.5 milliLiter(s) IntraMuscular once  lactulose Syrup 20 Gram(s) Oral three times a day  metroNIDAZOLE  IVPB      metroNIDAZOLE  IVPB 500 milliGRAM(s) IV Intermittent every 8 hours  pantoprazole    Tablet 40 milliGRAM(s) Oral before breakfast  rifAXIMin 550 milliGRAM(s) Oral two times a day    MEDICATIONS  (PRN):  LORazepam   Injectable 2 milliGRAM(s) IntraMuscular every 2 hours PRN Symptom-triggered: 2 point increase in CIWA -Ar score and a total score of 7 or LESS  traMADol 25 milliGRAM(s) Oral every 8 hours PRN Severe Pain (7 - 10)      New X-rays reviewed:                                                                                  ECHO     Patient is a 28y old  Male who presents with a chief complaint of cholestasis, abdominal ascites, hyponatremia (18 Nov 2023 09:49)        Over Night Events: no overnight events , GI noted    PHYSICAL EXAM    ICU Vital Signs Last 24 Hrs  T(C): 36.8 (19 Nov 2023 08:00), Max: 38.1 (18 Nov 2023 12:07)  T(F): 98.2 (19 Nov 2023 08:00), Max: 100.5 (18 Nov 2023 12:07)  HR: 109 (19 Nov 2023 08:00) (85 - 109)  BP: 103/60 (19 Nov 2023 08:00) (94/51 - 111/55)  BP(mean): 76 (19 Nov 2023 08:00) (75 - 76)  RR: 20 (19 Nov 2023 08:00) (20 - 20)  SpO2: 99% (19 Nov 2023 08:00) (95% - 99%)    O2 Parameters below as of 19 Nov 2023 08:00  Patient On (Oxygen Delivery Method): room air        CONSTITUTIONAL:  jaundice  icteric sclera    CARDIAC:   Normal rate,   Regular rhythm.    No edema    RESPIRATORY:   No wheezing  Bilateral BS  Normal chest expansion  Not tachypneic,  No use of accessory muscles    GASTROINTESTINAL:  Abdomen soft,   distended    MUSCULOSKELETAL:   Range of motion is not limited,  No clubbing, cyanosis    NEUROLOGICAL:   Alert and oriented   No motor  deficits    11-18-23 @ 07:01  -  11-19-23 @ 07:00  --------------------------------------------------------  IN:    Albumin 25%  -  50 mL: 300 mL    IV PiggyBack: 100 mL    IV PiggyBack: 50 mL    Oral Fluid: 120 mL  Total IN: 570 mL    OUT:  Total OUT: 0 mL    Total NET: 570 mL      LABS:                          8.6    12.56 )-----------( 186      ( 19 Nov 2023 05:10 )             25.0                                               11-19    128<L>  |  94<L>  |  27<H>  ----------------------------<  122<H>  3.7   |  17  |  1.3    Ca    7.7<L>      19 Nov 2023 05:10  Mg     2.6     11-19    TPro  5.0<L>  /  Alb  3.5  /  TBili  32.6<HH>  /  DBili  x   /  AST  105<H>  /  ALT  11  /  AlkPhos  110  11-19      PT/INR - ( 19 Nov 2023 05:10 )   PT: 26.80 sec;   INR: 2.33 ratio       PTT - ( 19 Nov 2023 05:10 )  PTT:38.8 sec                                       Urinalysis Basic - ( 19 Nov 2023 05:10 )    Color: x / Appearance: x / SG: x / pH: x  Gluc: 122 mg/dL / Ketone: x  / Bili: x / Urobili: x   Blood: x / Protein: x / Nitrite: x   Leuk Esterase: x / RBC: x / WBC x   Sq Epi: x / Non Sq Epi: x / Bacteria: x                                                  LIVER FUNCTIONS - ( 19 Nov 2023 05:10 )  Alb: 3.5 g/dL / Pro: 5.0 g/dL / ALK PHOS: 110 U/L / ALT: 11 U/L / AST: 105 U/L / GGT: x                                                  Culture - Fungal, Body Fluid (collected 16 Nov 2023 12:27)  Source: Peritoneal Peritoneal Fluid  Preliminary Report (18 Nov 2023 15:03):    Culture is being performed. Fungal cultures are held for 4 weeks.    Culture - Body Fluid with Gram Stain (collected 16 Nov 2023 12:27)  Source: Abdominal Fl Abdominal Fluid  Gram Stain (17 Nov 2023 01:13):    No polymorphonuclear leukocytes seen    No organisms seen    by cytocentrifuge  Preliminary Report (17 Nov 2023 19:54):    No growth to date.                                                                                           MEDICATIONS  (STANDING):  cefTRIAXone   IVPB 1000 milliGRAM(s) IV Intermittent every 24 hours  chlorhexidine 2% Cloths 1 Application(s) Topical daily  influenza   Vaccine 0.5 milliLiter(s) IntraMuscular once  lactulose Syrup 20 Gram(s) Oral three times a day  metroNIDAZOLE  IVPB      metroNIDAZOLE  IVPB 500 milliGRAM(s) IV Intermittent every 8 hours  pantoprazole    Tablet 40 milliGRAM(s) Oral before breakfast  rifAXIMin 550 milliGRAM(s) Oral two times a day    MEDICATIONS  (PRN):  LORazepam   Injectable 2 milliGRAM(s) IntraMuscular every 2 hours PRN Symptom-triggered: 2 point increase in CIWA -Ar score and a total score of 7 or LESS  traMADol 25 milliGRAM(s) Oral every 8 hours PRN Severe Pain (7 - 10)

## 2023-11-19 NOTE — PROGRESS NOTE ADULT - ATTENDING COMMENTS
28 y o M of [place] origin with AUD admitted with jaundice and abdominal distension.   Has been having abdominal distension for few weeks and jaundice over several weeks. Reports that jaundice improving.   Stopped alcohol more than 4 weeks ago. Was drinking 4 beers per day.   Lives with mother and brother. Brother volunteered to be a donor. 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 Works as [employer] .     No confusion. Abdomen getting distended.   Alert oriented x 3  Icteric  Abdomen soft hepatomegaly+  Ext no edema  No asterixis     Severe alcoholic hepatitis with coagulopathy MELD > 30 MDF > 32  Hyponatremia  YANDY    Continue IV Human albumin 25 % 20 g  Hold prednisone with YANDY  Can stop fluid restriction.   Continue vitamin K IV x 3 doses total  With YANDY hold diuretics. Does not want spironolactone, so can use eplerenone and furosemide when creatinine improves.   Work up for acute hepatitis TSH S cortisol  Echo  Monitor MELD labs  Pre transplant lab work  Offered transfer to LT center but did not want and wants to be monitored here. Advised if signs of worsening will transfer to [facility].  If encephalopathy or worsening coagulopathy will need urgent transfer. If no improvement in MELD will advise transfer again for urgent listing but is likely will improve with time.   No dental issues. Can address dental problems post LT if needed.  Hepatitis B vaccination. 28 y o M of [place] origin with AUD admitted with jaundice and abdominal distension.   Has been having abdominal distension for few weeks and jaundice over several weeks. Reports that jaundice improving.   Stopped alcohol more than 4 weeks ago. Was drinking 4 beers per day.   Lives with mother and brother. Brother volunteered to be a donor. 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     No confusion. Abdomen getting distended.   Alert oriented x 3  Icteric  Abdomen soft hepatomegaly+  Ext no edema  No asterixis     Severe alcoholic hepatitis with coagulopathy MELD > 30 MDF > 32  Hyponatremia  YANDY    Continue IV Human albumin 25 % 20 g  Hold prednisone with YANDY. Treat as HRS with midodrine albumin and octreotide  Can stop fluid restriction.   Continue vitamin K IV x 3 doses total  With YANDY hold diuretics. Does not want spironolactone, so can use eplerenone and furosemide when creatinine improves.   Work up for acute hepatitis TSH S cortisol  Echo  Monitor MELD labs  Pre transplant lab work  Offered transfer to LT center but did not want and wants to be monitored here. Advised if signs of worsening will transfer to [facility].  If encephalopathy or worsening coagulopathy will need urgent transfer. If no improvement in MELD will advise transfer again for urgent listing but is likely will improve with time.   No dental issues. Can address dental problems post LT if needed.  Hepatitis B vaccination.

## 2023-11-20 LAB
ALBUMIN SERPL ELPH-MCNC: 3.3 G/DL — LOW (ref 3.5–5.2)
ALBUMIN SERPL ELPH-MCNC: 3.3 G/DL — LOW (ref 3.5–5.2)
ALBUMIN SERPL ELPH-MCNC: 3.7 G/DL — SIGNIFICANT CHANGE UP (ref 3.5–5.2)
ALBUMIN SERPL ELPH-MCNC: 3.7 G/DL — SIGNIFICANT CHANGE UP (ref 3.5–5.2)
ALP SERPL-CCNC: 106 U/L — SIGNIFICANT CHANGE UP (ref 30–115)
ALP SERPL-CCNC: 106 U/L — SIGNIFICANT CHANGE UP (ref 30–115)
ALP SERPL-CCNC: 111 U/L — SIGNIFICANT CHANGE UP (ref 30–115)
ALP SERPL-CCNC: 111 U/L — SIGNIFICANT CHANGE UP (ref 30–115)
ALT FLD-CCNC: 12 U/L — SIGNIFICANT CHANGE UP (ref 0–41)
ALT FLD-CCNC: 12 U/L — SIGNIFICANT CHANGE UP (ref 0–41)
ALT FLD-CCNC: 13 U/L — SIGNIFICANT CHANGE UP (ref 0–41)
ALT FLD-CCNC: 13 U/L — SIGNIFICANT CHANGE UP (ref 0–41)
ANION GAP SERPL CALC-SCNC: 13 MMOL/L — SIGNIFICANT CHANGE UP (ref 7–14)
ANION GAP SERPL CALC-SCNC: 13 MMOL/L — SIGNIFICANT CHANGE UP (ref 7–14)
APTT BLD: 41.6 SEC — HIGH (ref 27–39.2)
APTT BLD: 41.6 SEC — HIGH (ref 27–39.2)
APTT BLD: 42.3 SEC — HIGH (ref 27–39.2)
APTT BLD: 42.3 SEC — HIGH (ref 27–39.2)
AST SERPL-CCNC: 104 U/L — HIGH (ref 0–41)
AST SERPL-CCNC: 104 U/L — HIGH (ref 0–41)
AST SERPL-CCNC: 111 U/L — HIGH (ref 0–41)
AST SERPL-CCNC: 111 U/L — HIGH (ref 0–41)
BASOPHILS # BLD AUTO: 0.1 K/UL — SIGNIFICANT CHANGE UP (ref 0–0.2)
BASOPHILS # BLD AUTO: 0.1 K/UL — SIGNIFICANT CHANGE UP (ref 0–0.2)
BASOPHILS NFR BLD AUTO: 0.6 % — SIGNIFICANT CHANGE UP (ref 0–1)
BASOPHILS NFR BLD AUTO: 0.6 % — SIGNIFICANT CHANGE UP (ref 0–1)
BILIRUB DIRECT SERPL-MCNC: 25.4 MG/DL — HIGH (ref 0–0.3)
BILIRUB DIRECT SERPL-MCNC: 25.4 MG/DL — HIGH (ref 0–0.3)
BILIRUB INDIRECT FLD-MCNC: 9.1 MG/DL — HIGH (ref 0.2–1.2)
BILIRUB INDIRECT FLD-MCNC: 9.1 MG/DL — HIGH (ref 0.2–1.2)
BILIRUB SERPL-MCNC: 34 MG/DL — CRITICAL HIGH (ref 0.2–1.2)
BILIRUB SERPL-MCNC: 34 MG/DL — CRITICAL HIGH (ref 0.2–1.2)
BILIRUB SERPL-MCNC: 34.5 MG/DL — CRITICAL HIGH (ref 0.2–1.2)
BILIRUB SERPL-MCNC: 34.5 MG/DL — CRITICAL HIGH (ref 0.2–1.2)
BUN SERPL-MCNC: 33 MG/DL — HIGH (ref 10–20)
BUN SERPL-MCNC: 33 MG/DL — HIGH (ref 10–20)
CALCIUM SERPL-MCNC: 8 MG/DL — LOW (ref 8.4–10.4)
CALCIUM SERPL-MCNC: 8 MG/DL — LOW (ref 8.4–10.4)
CHLORIDE SERPL-SCNC: 97 MMOL/L — LOW (ref 98–110)
CHLORIDE SERPL-SCNC: 97 MMOL/L — LOW (ref 98–110)
CMV DNA CSF QL NAA+PROBE: SIGNIFICANT CHANGE UP IU/ML
CMV DNA SPEC NAA+PROBE-LOG#: SIGNIFICANT CHANGE UP LOG10IU/ML
CO2 SERPL-SCNC: 18 MMOL/L — SIGNIFICANT CHANGE UP (ref 17–32)
CO2 SERPL-SCNC: 18 MMOL/L — SIGNIFICANT CHANGE UP (ref 17–32)
CREAT SERPL-MCNC: 1.4 MG/DL — SIGNIFICANT CHANGE UP (ref 0.7–1.5)
CREAT SERPL-MCNC: 1.4 MG/DL — SIGNIFICANT CHANGE UP (ref 0.7–1.5)
EGFR: 69 ML/MIN/1.73M2 — SIGNIFICANT CHANGE UP
EGFR: 69 ML/MIN/1.73M2 — SIGNIFICANT CHANGE UP
EOSINOPHIL # BLD AUTO: 0.22 K/UL — SIGNIFICANT CHANGE UP (ref 0–0.7)
EOSINOPHIL # BLD AUTO: 0.22 K/UL — SIGNIFICANT CHANGE UP (ref 0–0.7)
EOSINOPHIL NFR BLD AUTO: 1.3 % — SIGNIFICANT CHANGE UP (ref 0–8)
EOSINOPHIL NFR BLD AUTO: 1.3 % — SIGNIFICANT CHANGE UP (ref 0–8)
GLUCOSE BLDC GLUCOMTR-MCNC: 129 MG/DL — HIGH (ref 70–99)
GLUCOSE BLDC GLUCOMTR-MCNC: 129 MG/DL — HIGH (ref 70–99)
GLUCOSE SERPL-MCNC: 122 MG/DL — HIGH (ref 70–99)
GLUCOSE SERPL-MCNC: 122 MG/DL — HIGH (ref 70–99)
HCT VFR BLD CALC: 25.8 % — LOW (ref 42–52)
HCT VFR BLD CALC: 25.8 % — LOW (ref 42–52)
HCV RNA FLD QL NAA+PROBE: SIGNIFICANT CHANGE UP
HCV RNA FLD QL NAA+PROBE: SIGNIFICANT CHANGE UP
HCV RNA SPEC QL PROBE+SIG AMP: SIGNIFICANT CHANGE UP
HCV RNA SPEC QL PROBE+SIG AMP: SIGNIFICANT CHANGE UP
HGB BLD-MCNC: 8.9 G/DL — LOW (ref 14–18)
HGB BLD-MCNC: 8.9 G/DL — LOW (ref 14–18)
IGA FLD-MCNC: 471 MG/DL — SIGNIFICANT CHANGE UP (ref 84–499)
IGA FLD-MCNC: 471 MG/DL — SIGNIFICANT CHANGE UP (ref 84–499)
IGG FLD-MCNC: 1186 MG/DL — SIGNIFICANT CHANGE UP (ref 610–1660)
IGG FLD-MCNC: 1186 MG/DL — SIGNIFICANT CHANGE UP (ref 610–1660)
IGM SERPL-MCNC: 128 MG/DL — SIGNIFICANT CHANGE UP (ref 35–242)
IGM SERPL-MCNC: 128 MG/DL — SIGNIFICANT CHANGE UP (ref 35–242)
IMM GRANULOCYTES NFR BLD AUTO: 1.8 % — HIGH (ref 0.1–0.3)
IMM GRANULOCYTES NFR BLD AUTO: 1.8 % — HIGH (ref 0.1–0.3)
INR BLD: 2.22 RATIO — HIGH (ref 0.65–1.3)
INR BLD: 2.22 RATIO — HIGH (ref 0.65–1.3)
INR BLD: 2.31 RATIO — HIGH (ref 0.65–1.3)
INR BLD: 2.31 RATIO — HIGH (ref 0.65–1.3)
KAPPA LC SER QL IFE: 7.43 MG/DL — HIGH (ref 0.33–1.94)
KAPPA LC SER QL IFE: 7.43 MG/DL — HIGH (ref 0.33–1.94)
KAPPA/LAMBDA FREE LIGHT CHAIN RATIO, SERUM: 2.3 RATIO — HIGH (ref 0.26–1.65)
KAPPA/LAMBDA FREE LIGHT CHAIN RATIO, SERUM: 2.3 RATIO — HIGH (ref 0.26–1.65)
LAMBDA LC SER QL IFE: 3.23 MG/DL — HIGH (ref 0.57–2.63)
LAMBDA LC SER QL IFE: 3.23 MG/DL — HIGH (ref 0.57–2.63)
LYMPHOCYTES # BLD AUTO: 1.53 K/UL — SIGNIFICANT CHANGE UP (ref 1.2–3.4)
LYMPHOCYTES # BLD AUTO: 1.53 K/UL — SIGNIFICANT CHANGE UP (ref 1.2–3.4)
LYMPHOCYTES # BLD AUTO: 8.9 % — LOW (ref 20.5–51.1)
LYMPHOCYTES # BLD AUTO: 8.9 % — LOW (ref 20.5–51.1)
MAGNESIUM SERPL-MCNC: 2.5 MG/DL — HIGH (ref 1.8–2.4)
MAGNESIUM SERPL-MCNC: 2.5 MG/DL — HIGH (ref 1.8–2.4)
MCHC RBC-ENTMCNC: 34.5 G/DL — SIGNIFICANT CHANGE UP (ref 32–37)
MCHC RBC-ENTMCNC: 34.5 G/DL — SIGNIFICANT CHANGE UP (ref 32–37)
MCHC RBC-ENTMCNC: 35.5 PG — HIGH (ref 27–31)
MCHC RBC-ENTMCNC: 35.5 PG — HIGH (ref 27–31)
MCV RBC AUTO: 102.8 FL — HIGH (ref 80–94)
MCV RBC AUTO: 102.8 FL — HIGH (ref 80–94)
MONOCYTES # BLD AUTO: 1.35 K/UL — HIGH (ref 0.1–0.6)
MONOCYTES # BLD AUTO: 1.35 K/UL — HIGH (ref 0.1–0.6)
MONOCYTES NFR BLD AUTO: 7.9 % — SIGNIFICANT CHANGE UP (ref 1.7–9.3)
MONOCYTES NFR BLD AUTO: 7.9 % — SIGNIFICANT CHANGE UP (ref 1.7–9.3)
NEUTROPHILS # BLD AUTO: 13.62 K/UL — HIGH (ref 1.4–6.5)
NEUTROPHILS # BLD AUTO: 13.62 K/UL — HIGH (ref 1.4–6.5)
NEUTROPHILS NFR BLD AUTO: 79.5 % — HIGH (ref 42.2–75.2)
NEUTROPHILS NFR BLD AUTO: 79.5 % — HIGH (ref 42.2–75.2)
NRBC # BLD: 0 /100 WBCS — SIGNIFICANT CHANGE UP (ref 0–0)
NRBC # BLD: 0 /100 WBCS — SIGNIFICANT CHANGE UP (ref 0–0)
PLATELET # BLD AUTO: 207 K/UL — SIGNIFICANT CHANGE UP (ref 130–400)
PLATELET # BLD AUTO: 207 K/UL — SIGNIFICANT CHANGE UP (ref 130–400)
PMV BLD: 9.9 FL — SIGNIFICANT CHANGE UP (ref 7.4–10.4)
PMV BLD: 9.9 FL — SIGNIFICANT CHANGE UP (ref 7.4–10.4)
POTASSIUM SERPL-MCNC: 3.4 MMOL/L — LOW (ref 3.5–5)
POTASSIUM SERPL-MCNC: 3.4 MMOL/L — LOW (ref 3.5–5)
POTASSIUM SERPL-SCNC: 3.4 MMOL/L — LOW (ref 3.5–5)
POTASSIUM SERPL-SCNC: 3.4 MMOL/L — LOW (ref 3.5–5)
PROT SERPL-MCNC: 5.2 G/DL — LOW (ref 6–8)
PROT SERPL-MCNC: 5.2 G/DL — LOW (ref 6–8)
PROT SERPL-MCNC: 5.3 G/DL — LOW (ref 6–8)
PROT SERPL-MCNC: 5.3 G/DL — LOW (ref 6–8)
PROTHROM AB SERPL-ACNC: 25.5 SEC — HIGH (ref 9.95–12.87)
PROTHROM AB SERPL-ACNC: 25.5 SEC — HIGH (ref 9.95–12.87)
PROTHROM AB SERPL-ACNC: 26.5 SEC — HIGH (ref 9.95–12.87)
PROTHROM AB SERPL-ACNC: 26.5 SEC — HIGH (ref 9.95–12.87)
RBC # BLD: 2.51 M/UL — LOW (ref 4.7–6.1)
RBC # BLD: 2.51 M/UL — LOW (ref 4.7–6.1)
RBC # FLD: 17.8 % — HIGH (ref 11.5–14.5)
RBC # FLD: 17.8 % — HIGH (ref 11.5–14.5)
SODIUM SERPL-SCNC: 128 MMOL/L — LOW (ref 135–146)
SODIUM SERPL-SCNC: 128 MMOL/L — LOW (ref 135–146)
WBC # BLD: 17.12 K/UL — HIGH (ref 4.8–10.8)
WBC # BLD: 17.12 K/UL — HIGH (ref 4.8–10.8)
WBC # FLD AUTO: 17.12 K/UL — HIGH (ref 4.8–10.8)
WBC # FLD AUTO: 17.12 K/UL — HIGH (ref 4.8–10.8)

## 2023-11-20 PROCEDURE — 99233 SBSQ HOSP IP/OBS HIGH 50: CPT

## 2023-11-20 RX ORDER — PHYTONADIONE (VIT K1) 5 MG
10 TABLET ORAL ONCE
Refills: 0 | Status: COMPLETED | OUTPATIENT
Start: 2023-11-20 | End: 2023-11-20

## 2023-11-20 RX ORDER — POTASSIUM CHLORIDE 20 MEQ
20 PACKET (EA) ORAL ONCE
Refills: 0 | Status: COMPLETED | OUTPATIENT
Start: 2023-11-20 | End: 2023-11-20

## 2023-11-20 RX ORDER — THIAMINE MONONITRATE (VIT B1) 100 MG
100 TABLET ORAL DAILY
Refills: 0 | Status: DISCONTINUED | OUTPATIENT
Start: 2023-11-20 | End: 2023-11-22

## 2023-11-20 RX ORDER — FOLIC ACID 0.8 MG
1 TABLET ORAL DAILY
Refills: 0 | Status: DISCONTINUED | OUTPATIENT
Start: 2023-11-20 | End: 2023-11-22

## 2023-11-20 RX ADMIN — PANTOPRAZOLE SODIUM 40 MILLIGRAM(S): 20 TABLET, DELAYED RELEASE ORAL at 06:36

## 2023-11-20 RX ADMIN — LACTULOSE 20 GRAM(S): 10 SOLUTION ORAL at 21:10

## 2023-11-20 RX ADMIN — Medication 100 MILLIGRAM(S): at 12:20

## 2023-11-20 RX ADMIN — Medication 50 MILLIEQUIVALENT(S): at 09:32

## 2023-11-20 RX ADMIN — LACTULOSE 20 GRAM(S): 10 SOLUTION ORAL at 13:11

## 2023-11-20 RX ADMIN — Medication 102 MILLIGRAM(S): at 17:17

## 2023-11-20 RX ADMIN — LACTULOSE 20 GRAM(S): 10 SOLUTION ORAL at 05:41

## 2023-11-20 RX ADMIN — CHLORHEXIDINE GLUCONATE 1 APPLICATION(S): 213 SOLUTION TOPICAL at 12:20

## 2023-11-20 RX ADMIN — Medication 1 MILLIGRAM(S): at 12:20

## 2023-11-20 NOTE — PROGRESS NOTE ADULT - ATTENDING COMMENTS
28 y o M of [place] origin with AUD admitted with jaundice and abdominal distension.   Has been having abdominal distension for few weeks and jaundice over several weeks. Reports that jaundice improving.   Stopped alcohol more than 4 weeks ago. Was drinking 4 beers per day.   Lives with mother and brother. Brother volunteered to be a donor. 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     No confusion. Abdomen getting more distended. Still declining transfer. Declining octreotide.  Alert oriented x 3  Icteric  Abdomen distended soft hepatomegaly+   Ext no edema  No asterixis   HBsAg HBcAb total HCV RNA HIV EBV Ig M negative  CMV PCR not detected. AMA ASMA negative     Severe alcoholic hepatitis with coagulopathy ascites MELD > 30 MDF > 32  Hyponatremia  YANDY likely YANDY - HRS  Blood group - B pos    Nephrology input and start on terlipressin.   Continue IV Human albumin 25 % 20 g  Hold prednisone with YANDY.   If starting terlipressin stop midodrine. Avoid large doses of albumin if starting terlipressin  No need for fluid restriction.   With YANDY hold diuretics. Does not want spironolactone, so can use eplerenone and furosemide when creatinine improves.   Please check 8 am S cortisol and Pre transplant lab work - syphilis EBV PCR strongyloides ab toxoplasma ab MMR antibodies  Echo - rule out CMP  Monitor MELD labs  Advised transfer to LT center for evaluation and listing but declined. Advised if signs of worsening will transfer to NS.  If encephalopathy or worsening coagulopathy will need urgent transfer.    No dental issues. Can address dental problems post LT if needed.  Hepatitis B vaccination.

## 2023-11-20 NOTE — PROGRESS NOTE ADULT - SUBJECTIVE AND OBJECTIVE BOX
Patient is a 28y old  Male who presents with a chief complaint of cholestasis, abdominal ascites, hyponatremia (19 Nov 2023 12:42)        Over Night Events: no acute events overnight, on room air, IV-locked, comfortable         Vital Signs Last 24 Hrs  T(C): 36.4 (20 Nov 2023 00:20), Max: 36.9 (19 Nov 2023 20:00)  T(F): 97.6 (20 Nov 2023 00:20), Max: 98.4 (19 Nov 2023 20:00)  HR: 101 (20 Nov 2023 00:20) (100 - 109)  BP: 107/62 (20 Nov 2023 00:20) (103/60 - 120/68)  BP(mean): 84 (19 Nov 2023 20:00) (76 - 84)  RR: 18 (20 Nov 2023 00:20) (18 - 22)  SpO2: 100% (20 Nov 2023 00:20) (99% - 100%)    O2 Parameters below as of 19 Nov 2023 20:00  Patient On (Oxygen Delivery Method): room air      CONSTITUTIONAL:  jaundice  icteric sclera    CARDIAC:   Normal rate,   Regular rhythm.    No edema    RESPIRATORY:   No wheezing  Bilateral BS  Normal chest expansion  Not tachypneic,  No use of accessory muscles    GASTROINTESTINAL:  Abdomen soft,   distended    MUSCULOSKELETAL:   Range of motion is not limited,  No clubbing, cyanosis    NEUROLOGICAL:   Alert and oriented   No motor  deficits      11-19-23 @ 07:01  -  11-20-23 @ 07:00  --------------------------------------------------------  IN:    Albumin 25%  -  50 mL: 80 mL  Total IN: 80 mL    OUT:  Total OUT: 0 mL    Total NET: 80 mL          LABS:                            8.9    17.12 )-----------( 207      ( 20 Nov 2023 04:54 )             25.8                                               11-20    128<L>  |  97<L>  |  33<H>  ----------------------------<  122<H>  3.4<L>   |  18  |  1.4    Ca    8.0<L>      20 Nov 2023 04:54  Mg     2.5     11-20    TPro  5.2<L>  /  Alb  3.7  /  TBili  34.0<HH>  /  DBili  x   /  AST  104<H>  /  ALT  12  /  AlkPhos  106  11-20      PT/INR - ( 20 Nov 2023 04:54 )   PT: 26.50 sec;   INR: 2.31 ratio         PTT - ( 20 Nov 2023 04:54 )  PTT:42.3 sec                                       Urinalysis Basic - ( 20 Nov 2023 04:54 )    Color: x / Appearance: x / SG: x / pH: x  Gluc: 122 mg/dL / Ketone: x  / Bili: x / Urobili: x   Blood: x / Protein: x / Nitrite: x   Leuk Esterase: x / RBC: x / WBC x   Sq Epi: x / Non Sq Epi: x / Bacteria: x                                                  LIVER FUNCTIONS - ( 20 Nov 2023 04:54 )  Alb: 3.7 g/dL / Pro: 5.2 g/dL / ALK PHOS: 106 U/L / ALT: 12 U/L / AST: 104 U/L / GGT: x                                                                                                                                       MEDICATIONS  (STANDING):  chlorhexidine 2% Cloths 1 Application(s) Topical daily  hepatitis B Vaccine - Adult (ENGERIX-B) 20 MICROGram(s) IntraMuscular once  influenza   Vaccine 0.5 milliLiter(s) IntraMuscular once  lactulose Syrup 20 Gram(s) Oral three times a day  midodrine. 5 milliGRAM(s) Oral three times a day  octreotide  Injectable 100 MICROGram(s) SubCutaneous three times a day  pantoprazole    Tablet 40 milliGRAM(s) Oral before breakfast    MEDICATIONS  (PRN):  LORazepam   Injectable 2 milliGRAM(s) IntraMuscular every 2 hours PRN Symptom-triggered: 2 point increase in CIWA -Ar score and a total score of 7 or LESS  traMADol 25 milliGRAM(s) Oral every 8 hours PRN Severe Pain (7 - 10)      CXR reviewed

## 2023-11-20 NOTE — PROGRESS NOTE ADULT - SUBJECTIVE AND OBJECTIVE BOX
Gastroenterology progress note:     Patient is a 28y old  Male who presents with a chief complaint of cholestasis, abdominal ascites, hyponatremia (20 Nov 2023 07:47)       Admitted on: 11-16-23    We are following the patient for: decompensated liver cirrhosis        Interval History: pt stable, s/p 20g albumin yesterday        PAST MEDICAL & SURGICAL HISTORY:  No significant past surgical history          MEDICATIONS  (STANDING):  chlorhexidine 2% Cloths 1 Application(s) Topical daily  folic acid 1 milliGRAM(s) Oral daily  hepatitis B Vaccine - Adult (ENGERIX-B) 20 MICROGram(s) IntraMuscular once  influenza   Vaccine 0.5 milliLiter(s) IntraMuscular once  lactulose Syrup 20 Gram(s) Oral three times a day  midodrine. 5 milliGRAM(s) Oral three times a day  octreotide  Injectable 100 MICROGram(s) SubCutaneous three times a day  pantoprazole    Tablet 40 milliGRAM(s) Oral before breakfast  potassium chloride  20 mEq/100 mL IVPB 20 milliEquivalent(s) IV Intermittent once  thiamine 100 milliGRAM(s) Oral daily    MEDICATIONS  (PRN):  LORazepam   Injectable 2 milliGRAM(s) IntraMuscular every 2 hours PRN Symptom-triggered: 2 point increase in CIWA -Ar score and a total score of 7 or LESS  traMADol 25 milliGRAM(s) Oral every 8 hours PRN Severe Pain (7 - 10)      Allergies  No Known Allergies      Review of Systems:   Cardiovascular:  No Chest Pain, No Palpitations  Respiratory:  No Cough, No Dyspnea  Gastrointestinal:  As described in HPI  Skin:  No Skin Lesions, No Jaundice  Neuro:  No Syncope, No Dizziness    Physical Examination:  T(C): 36.4 (11-20-23 @ 00:20), Max: 36.9 (11-19-23 @ 20:00)  HR: 101 (11-20-23 @ 00:20) (100 - 102)  BP: 107/62 (11-20-23 @ 00:20) (106/73 - 120/68)  RR: 18 (11-20-23 @ 00:20) (18 - 22)  SpO2: 100% (11-20-23 @ 00:20) (99% - 100%)      11-19-23 @ 07:01  -  11-20-23 @ 07:00  --------------------------------------------------------  IN: 80 mL / OUT: 0 mL / NET: 80 mL        GENERAL: AAOx3, no acute distress.  HEAD:  Atraumatic, Normocephalic  EYES: conjunctiva and sclera clear  NECK: Supple, no JVD or thyromegaly  CHEST/LUNG: Clear to auscultation bilaterally; No wheeze, rhonchi, or rales  HEART: Regular rate and rhythm; normal S1, S2, No murmurs.  ABDOMEN: Soft, nontender,moderately distended; Bowel sounds present  NEUROLOGY: No asterixis or tremor.   SKIN:  jaundice +     Data:                        8.9    17.12 )-----------( 207      ( 20 Nov 2023 04:54 )             25.8     Hgb trend:  8.9  11-20-23 @ 04:54  8.6  11-19-23 @ 05:10  8.7  11-18-23 @ 07:04      11-18-23 @ 07:01  -  11-19-23 @ 07:00  --------------------------------------------------------  IN: 300 mL    11-19-23 @ 07:01  -  11-20-23 @ 07:00  --------------------------------------------------------  IN: 80 mL      11-20    128<L>  |  97<L>  |  33<H>  ----------------------------<  122<H>  3.4<L>   |  18  |  1.4    Ca    8.0<L>      20 Nov 2023 04:54  Mg     2.5     11-20    TPro  5.2<L>  /  Alb  3.7  /  TBili  34.0<HH>  /  DBili  x   /  AST  104<H>  /  ALT  12  /  AlkPhos  106  11-20    Liver panel trend:  TBili 34.0   /      /   ALT 12   /   AlkP 106   /   Tptn 5.2   /   Alb 3.7    /   DBili --      11-20  TBili 34.5   /      /   ALT 13   /   AlkP 111   /   Tptn 5.3   /   Alb 3.3    /   DBili 25.4      11-19  TBili 32.6   /      /   ALT 11   /   AlkP 110   /   Tptn 5.0   /   Alb 3.5    /   DBili --      11-19  TBili 33.0   /      /   ALT 10   /   AlkP 107   /   Tptn 5.2   /   Alb 3.7    /   DBili 25.6      11-18  TBili 31.5   /      /   ALT 12   /   AlkP 123   /   Tptn 4.9   /   Alb 3.1    /   DBili --      11-18  TBili 30.5   /      /   ALT 14   /   AlkP 143   /   Tptn 5.0   /   Alb 2.7    /   DBili >17.2      11-17  TBili 34.3   /   AST --   /   ALT --   /   AlkP --   /   Tptn --   /   Alb --    /   DBili --      11-17  TBili 30.6   /      /   ALT 13   /   AlkP 150   /   Tptn 5.0   /   Alb 2.7    /   DBili 20.1      11-17  TBili 29.9   /      /   ALT 14   /   AlkP 147   /   Tptn 5.0   /   Alb 2.5    /   DBili 20.3      11-17  TBili 29.2   /      /   ALT 14   /   AlkP 165   /   Tptn 5.1   /   Alb 2.4    /   DBili >17.2      11-16  TBili 29.6   /      /   ALT 15   /   AlkP 162   /   Tptn 5.3   /   Alb 2.5    /   DBili >17.2      11-16  TBili 31.1   /      /   ALT 15   /   AlkP 175   /   Tptn 5.5   /   Alb 2.7    /   DBili >17.2      11-15      PT/INR - ( 20 Nov 2023 04:54 )   PT: 26.50 sec;   INR: 2.31 ratio         PTT - ( 20 Nov 2023 04:54 )  PTT:42.3 sec        Gastroenterology progress note:     Patient is a 28y old  Male who presents with a chief complaint of cholestasis, abdominal ascites, hyponatremia (20 Nov 2023 07:47)       Admitted on: 11-16-23    We are following the patient for: decompensated liver cirrhosis        Interval History: pt stable, s/p 20g albumin yesterday; refusing octretide        PAST MEDICAL & SURGICAL HISTORY:  No significant past surgical history          MEDICATIONS  (STANDING):  chlorhexidine 2% Cloths 1 Application(s) Topical daily  folic acid 1 milliGRAM(s) Oral daily  hepatitis B Vaccine - Adult (ENGERIX-B) 20 MICROGram(s) IntraMuscular once  influenza   Vaccine 0.5 milliLiter(s) IntraMuscular once  lactulose Syrup 20 Gram(s) Oral three times a day  midodrine. 5 milliGRAM(s) Oral three times a day  octreotide  Injectable 100 MICROGram(s) SubCutaneous three times a day  pantoprazole    Tablet 40 milliGRAM(s) Oral before breakfast  potassium chloride  20 mEq/100 mL IVPB 20 milliEquivalent(s) IV Intermittent once  thiamine 100 milliGRAM(s) Oral daily    MEDICATIONS  (PRN):  LORazepam   Injectable 2 milliGRAM(s) IntraMuscular every 2 hours PRN Symptom-triggered: 2 point increase in CIWA -Ar score and a total score of 7 or LESS  traMADol 25 milliGRAM(s) Oral every 8 hours PRN Severe Pain (7 - 10)      Allergies  No Known Allergies      Review of Systems:   Cardiovascular:  No Chest Pain, No Palpitations  Respiratory:  No Cough, No Dyspnea  Gastrointestinal:  As described in HPI  Skin:  No Skin Lesions, No Jaundice  Neuro:  No Syncope, No Dizziness    Physical Examination:  T(C): 36.4 (11-20-23 @ 00:20), Max: 36.9 (11-19-23 @ 20:00)  HR: 101 (11-20-23 @ 00:20) (100 - 102)  BP: 107/62 (11-20-23 @ 00:20) (106/73 - 120/68)  RR: 18 (11-20-23 @ 00:20) (18 - 22)  SpO2: 100% (11-20-23 @ 00:20) (99% - 100%)      11-19-23 @ 07:01  -  11-20-23 @ 07:00  --------------------------------------------------------  IN: 80 mL / OUT: 0 mL / NET: 80 mL        GENERAL: AAOx3, no acute distress.  HEAD:  Atraumatic, Normocephalic  EYES: conjunctiva and sclera clear  NECK: Supple, no JVD or thyromegaly  CHEST/LUNG: Clear to auscultation bilaterally; No wheeze, rhonchi, or rales  HEART: Regular rate and rhythm; normal S1, S2, No murmurs.  ABDOMEN: Soft, nontender,moderately distended; Bowel sounds present  NEUROLOGY: No asterixis or tremor.   SKIN:  jaundice +     Data:                        8.9    17.12 )-----------( 207      ( 20 Nov 2023 04:54 )             25.8     Hgb trend:  8.9  11-20-23 @ 04:54  8.6  11-19-23 @ 05:10  8.7  11-18-23 @ 07:04      11-18-23 @ 07:01  -  11-19-23 @ 07:00  --------------------------------------------------------  IN: 300 mL    11-19-23 @ 07:01  -  11-20-23 @ 07:00  --------------------------------------------------------  IN: 80 mL      11-20    128<L>  |  97<L>  |  33<H>  ----------------------------<  122<H>  3.4<L>   |  18  |  1.4    Ca    8.0<L>      20 Nov 2023 04:54  Mg     2.5     11-20    TPro  5.2<L>  /  Alb  3.7  /  TBili  34.0<HH>  /  DBili  x   /  AST  104<H>  /  ALT  12  /  AlkPhos  106  11-20    Liver panel trend:  TBili 34.0   /      /   ALT 12   /   AlkP 106   /   Tptn 5.2   /   Alb 3.7    /   DBili --      11-20  TBili 34.5   /      /   ALT 13   /   AlkP 111   /   Tptn 5.3   /   Alb 3.3    /   DBili 25.4      11-19  TBili 32.6   /      /   ALT 11   /   AlkP 110   /   Tptn 5.0   /   Alb 3.5    /   DBili --      11-19  TBili 33.0   /      /   ALT 10   /   AlkP 107   /   Tptn 5.2   /   Alb 3.7    /   DBili 25.6      11-18  TBili 31.5   /      /   ALT 12   /   AlkP 123   /   Tptn 4.9   /   Alb 3.1    /   DBili --      11-18  TBili 30.5   /      /   ALT 14   /   AlkP 143   /   Tptn 5.0   /   Alb 2.7    /   DBili >17.2      11-17  TBili 34.3   /   AST --   /   ALT --   /   AlkP --   /   Tptn --   /   Alb --    /   DBili --      11-17  TBili 30.6   /      /   ALT 13   /   AlkP 150   /   Tptn 5.0   /   Alb 2.7    /   DBili 20.1      11-17  TBili 29.9   /      /   ALT 14   /   AlkP 147   /   Tptn 5.0   /   Alb 2.5    /   DBili 20.3      11-17  TBili 29.2   /      /   ALT 14   /   AlkP 165   /   Tptn 5.1   /   Alb 2.4    /   DBili >17.2      11-16  TBili 29.6   /      /   ALT 15   /   AlkP 162   /   Tptn 5.3   /   Alb 2.5    /   DBili >17.2      11-16  TBili 31.1   /      /   ALT 15   /   AlkP 175   /   Tptn 5.5   /   Alb 2.7    /   DBili >17.2      11-15      PT/INR - ( 20 Nov 2023 04:54 )   PT: 26.50 sec;   INR: 2.31 ratio         PTT - ( 20 Nov 2023 04:54 )  PTT:42.3 sec

## 2023-11-20 NOTE — PROGRESS NOTE ADULT - ASSESSMENT
29 yo male previously healthy presented today for worsening jaundice and abdominal distention that started 2 weeks ago. Since his jaundice started, the patient began drinking a lot of fluids minimum 4L / day, his abdominal distention kept getting worse and his urine became darker, with oliguria. He reports SOB attributed to his abdomen pushing up on his diaphragm.      Acute liver failure unclear etiology but suspected ETOH use  Elevated INR  used to be a daily 4 beer/day drinker with episodes of binge drinking, stopped 1 month ago  - CT abd pelvis:   a) Diffuse large amount of abdominal and pelvic ascites. Hepatosplenomegaly with right paracolic varices, findings suspicious for portal hypertension. Diffuse colonic wall thickening, possibly related to a diffuse colitis versus this severely edematous state of the patient. Nonspecific dilated loops of small bowel without definite evidence of obstruction.   b) Partial collapse of the right lower lobe, left lower lobe and right middle lobe.  - RUQ US :   a)  Enlarged fatty infiltrated liver (hepatomegaly with diffuse steatosis).  b) Sludge within the gallbladder lumen which has a thickened edematous wall. No echogenic stones. Negative sonographic Carter's sign. Thickened edematous gallbladder wall is a nonspecific finding possibly related to volume status in this patient.   c)  Ascites is noted.  - s/p paracentesis 11/16 with 3.2 L removed, no evidence of SBP. Now off antibiotics. frequent abdominal exams   - s/p Albumin, Vit K. Monitor Mental status  - started on albumin 20 g and octreotide. patient refusing octreotide, explained the importance. Discussed with hepatology to follow up  - monitor LFts/INR q8H  - hepatology following, patient continues to refuse LT center transfer    Severe Subacute hyponatremia - likely hypervolemic 2/2 increased free water intake slowly correcting   YANDY  patient asymptomatic   - BMP monitoring Q8H  - per GI, can dc fluid restriction but avoid IVF   - Nephrology following, c.w midodrine 5 q8H    Normocytic anemia with no signs of bleed, monitor    Overall prognosis is guarded, if worsening mental status or coagulopathy, will need urgent transfer to tplant center, hepatology on board     Patient seen at bedside, total time spent to evaluate and treat the patient's acute illness and chronic medical conditions as well as time spent reviewing labs, radiology, discussing medical plan with covering medical team was more than 50 minutes with >50% of time spent face to face with patient, discussing with patient and father at bedside, as well as coordination of care

## 2023-11-20 NOTE — PROGRESS NOTE ADULT - SUBJECTIVE AND OBJECTIVE BOX
SARABIAWILLIAM  28y Male    CHIEF COMPLAINT:    Patient is a 28y old  Male who presents with a chief complaint of cholestasis, abdominal ascites, hyponatremia (20 Nov 2023 08:10)    INTERVAL HPI/OVERNIGHT EVENTS:    Patient seen and examined. No acute events overnight. Overall unchanged, complained for muscle spasms overnight     ROS: All other systems are negative.    Vital Signs:    T(F): 97.6 (11-20-23 @ 00:20), Max: 98.4 (11-19-23 @ 20:00)  HR: 101 (11-20-23 @ 08:34) (100 - 102)  BP: 116/72 (11-20-23 @ 08:34) (106/73 - 120/68)  RR: 22 (11-20-23 @ 08:34) (18 - 22)  SpO2: 98% (11-20-23 @ 08:34) (98% - 100%)    19 Nov 2023 07:01  -  20 Nov 2023 07:00  --------------------------------------------------------  IN: 80 mL / OUT: 0 mL / NET: 80 mL    PHYSICAL EXAM:    GENERAL:  NAD  SKIN: No rashes or lesions jaundiced   HEENT: Atraumatic. Normocephalic.    NECK: Supple, No JVD.   PULMONARY: CTA B/L. No wheezing. No rales  CVS: Normal S1, S2. Rate and Rhythm are regular.   ABDOMEN/GI: Soft, Nontender,  distended   MSK:  No clubbing or cyanosis   NEUROLOGIC: moves all extremities   PSYCH: Awake and alert     Consultant(s) Notes Reviewed:  [x ] YES  [ ] NO  Care Discussed with Consultants/Other Providers [ x] YES  [ ] NO    LABS:                        8.9    17.12 )-----------( 207      ( 20 Nov 2023 04:54 )             25.8     128<L>  |  97<L>  |  33<H>  ----------------------------<  122<H>  3.4<L>   |  18  |  1.4    Ca    8.0<L>      20 Nov 2023 04:54  Mg     2.5     11-20    TPro  5.2<L>  /  Alb  3.7  /  TBili  34.0<HH>  /  DBili  x   /  AST  104<H>  /  ALT  12  /  AlkPhos  106  11-20    PT/INR - ( 20 Nov 2023 04:54 )   PT: 26.50 sec;   INR: 2.31 ratio      PTT - ( 20 Nov 2023 04:54 )  PTT:42.3 sec    RADIOLOGY & ADDITIONAL TESTS:  Imaging or report Personally Reviewed:  [x] YES  [ ] NO  EKG reviewed: [x] YES  [ ] NO    Medications:  Standing  chlorhexidine 2% Cloths 1 Application(s) Topical daily  folic acid 1 milliGRAM(s) Oral daily  hepatitis B Vaccine - Adult (ENGERIX-B) 20 MICROGram(s) IntraMuscular once  influenza   Vaccine 0.5 milliLiter(s) IntraMuscular once  lactulose Syrup 20 Gram(s) Oral three times a day  midodrine. 5 milliGRAM(s) Oral three times a day  octreotide  Injectable 100 MICROGram(s) SubCutaneous three times a day  pantoprazole    Tablet 40 milliGRAM(s) Oral before breakfast  thiamine 100 milliGRAM(s) Oral daily    PRN Meds  LORazepam   Injectable 2 milliGRAM(s) IntraMuscular every 2 hours PRN  traMADol 25 milliGRAM(s) Oral every 8 hours PRN

## 2023-11-20 NOTE — PROGRESS NOTE ADULT - ASSESSMENT
IMPRESSION:    Portal Hypertension/Hepatomegaly/Right Paracolic Varices/Alcoholic Hepatitis/Steatosis/Liver failure  Hyponatremia in setting of Liver Disease   Hepatorenal Syndrome  Possible Diffuse Colitis  Transaminitis, downtrending   Abdominal/Pelvic Ascites SP Paracentesis 3.2L      PLAN:    CNS: mental status at baseline, thiamine, folate, CIWA monitoring    HEENT: Oral care    PULMONARY:  HOB @ 45 degrees.  Aspiration precautions. on room air, incentive spirometry    CARDIOVASCULAR: avoid overload    GI: GI prophylaxis. Hepatology follow up,  SP albumin challenge, diuretics on hold secondary to YANDY, patient declined transfer to liver transplant center     RENAL:  Follow up lytes.  Correct as needed, fluid restriction     INFECTIOUS DISEASE: cultures negative to date, monitoring off ABX, procalcitonin     HEMATOLOGICAL:  DVT prophylaxis. maintain active type and screen, transfuse if Hgb<7.0    ENDOCRINE:  Follow up FS.  Insulin protocol if needed.    MUSCULOSKELETAL: activity as tolerated     SDU monitoring

## 2023-11-20 NOTE — PROGRESS NOTE ADULT - ASSESSMENT
29 yo male previously healthy presented today for worsening jaundice and abdominal distention that started 2 weeks ago. Since his jaundice started, the patient began drinking a lot of fluids minimum 4L / day, his abdominal distention kept getting worse and his urine became darker, with oliguria. He reports SOB attributed to his abdomen pushing up on his diaphragm.      Acute liver failure unclear etiology but suspected ETOH use  Elevated INR  used to be a daily 4 beer/day drinker with episodes of binge drinking, stopped 1 month ago  - CT abd pelvis:   a) Diffuse large amount of abdominal and pelvic ascites. Hepatosplenomegaly with right paracolic varices, findings suspicious for portal hypertension. Diffuse colonic wall thickening, possibly related to a diffuse colitis versus this severely edematous state of the patient. Nonspecific dilated loops of small bowel without definite evidence of obstruction.   b) Partial collapse of the right lower lobe, left lower lobe and right middle lobe.  - RUQ US :   a)  Enlarged fatty infiltrated liver (hepatomegaly with diffuse steatosis).  b) Sludge within the gallbladder lumen which has a thickened edematous wall. No echogenic stones. Negative sonographic Carter's sign. Thickened edematous gallbladder wall is a nonspecific finding possibly related to volume status in this patient.   c)  Ascites is noted.  - s/p paracentesis 11/16 with 3.2 L removed, no evidence of SBP. Now off antibiotics. frequent abdominal exams   - s/p Albumin, Vit K. Monitor Mental status  - started on albumin 20 g and octreotide. patient refusing octreotide for fear of side effects > hepatology discussed with patient and agreed to terlipressin alternative  - monitor LFts/INR q8H  - hepatology following, patient continues to refuse LT center transfer  - procedure team consulted (11/20) for therapeutic paracentesis   - blood cx NGTD (11/16), f/u peritoneal fluid cultures  - c/w thiamine + folate    Severe Subacute hyponatremia - likely hypervolemic 2/2 increased free water intake slowly correcting   YANDY  patient asymptomatic   - BMP monitoring Q8H  - per GI, can dc fluid restriction but avoid IVF   - Nephrology following, c.w midodrine 5 q8H    Normocytic anemia with no signs of bleed, monitor    Overall prognosis is guarded, if worsening mental status or coagulopathy, will need urgent transfer to tplant center, hepatology on board

## 2023-11-20 NOTE — PROGRESS NOTE ADULT - SUBJECTIVE AND OBJECTIVE BOX
24H events:    Patient is a 28y old Male who presents with a chief complaint of cholestasis, abdominal ascites, hyponatremia (20 Nov 2023 09:50)    Primary diagnosis of Jaundice    Today is hospital day 4d. This morning patient was seen and examined at bedside, resting comfortably in bed.    No acute or major events overnight.    Code Status:    Family communication:  Contact date:  Name of person contacted:  Relationship to patient:  Communication details:  What matters most:    PAST MEDICAL & SURGICAL HISTORY  No significant past surgical history      SOCIAL HISTORY:  Social History:      ALLERGIES:  No Known Allergies    MEDICATIONS:  STANDING MEDICATIONS  chlorhexidine 2% Cloths 1 Application(s) Topical daily  folic acid 1 milliGRAM(s) Oral daily  hepatitis B Vaccine - Adult (ENGERIX-B) 20 MICROGram(s) IntraMuscular once  influenza   Vaccine 0.5 milliLiter(s) IntraMuscular once  lactulose Syrup 20 Gram(s) Oral three times a day  midodrine. 5 milliGRAM(s) Oral three times a day  octreotide  Injectable 100 MICROGram(s) SubCutaneous three times a day  pantoprazole    Tablet 40 milliGRAM(s) Oral before breakfast  thiamine 100 milliGRAM(s) Oral daily    PRN MEDICATIONS  LORazepam   Injectable 2 milliGRAM(s) IntraMuscular every 2 hours PRN  traMADol 25 milliGRAM(s) Oral every 8 hours PRN    VITALS:   T(F): 97.6  HR: 101  BP: 116/72  RR: 22  SpO2: 98%    PHYSICAL EXAM:  GENERAL:   (  ) NAD, lying in bed comfortably     (  ) obtunded     (  ) lethargic     (  ) somnolent    HEAD:   (  ) Atraumatic     (  ) hematoma     (  ) laceration (specify location:       )     NECK:  (  ) Supple     (  ) neck stiffness     (  ) nuchal rigidity     (  )  no JVD     (  ) JVD present ( -- cm)    HEART:  Rate -->     (  ) normal rate     (  ) bradycardic     (  ) tachycardic  Rhythm -->     (  ) regular     (  ) regularly irregular     (  ) irregularly irregular  Murmurs -->     (  ) normal s1s2     (  ) systolic murmur     (  ) diastolic murmur     (  ) continuous murmur      (  ) S3 present     (  ) S4 present    LUNGS:   (  )Unlabored respirations     (  ) tachypnea  (  ) B/L air entry     (  ) decreased breath sounds in:  (location     )    (  ) no adventitious sound     (  ) crackles     (  ) wheezing      (  ) rhonchi      (specify location:       )  (  ) chest wall tenderness (specify location:       )    ABDOMEN:   (  ) Soft     (  ) tense   |   (  ) nondistended     (  ) distended   |   (  ) +BS     (  ) hypoactive bowel sounds     (  ) hyperactive bowel sounds  (  ) nontender     (  ) RUQ tenderness     (  ) RLQ tenderness     (  ) LLQ tenderness     (  ) epigastric tenderness     (  ) diffuse tenderness  (  ) Splenomegaly      (  ) Hepatomegaly      (  ) Jaundice     (  ) ecchymosis     EXTREMITIES: 2+ peripheral pulses bilaterally. No clubbing, cyanosis, or edema  (  ) Normal     (  ) Rash     (  ) ecchymosis     (  ) varicose veins      (  ) pitting edema     (  ) non-pitting edema   (  ) ulceration     (  ) gangrene:     (location:     )    NERVOUS SYSTEM:    (  ) A&Ox3     (  ) confused     (  ) lethargic  CN II-XII:     (  ) Intact     (  ) deficits found     (Specify:     )   Upper extremities:     (  ) no sensorimotor deficits     (  ) weakness     (  ) loss of proprioception/vibration     (  ) loss of touch/temperature (specify:    )  Lower extremities:     (  ) no sensorimotor deficits     (  ) weakness     (  ) loss of proprioception/vibration     (  ) loss of touch/temperature (specify:    )    SKIN:   (  ) No rashes or lesions     (  ) maculopapular rash     (  ) pustules     (  ) vesicles     (  ) ulcer     (  ) ecchymosis     (specify location:     )    AMPAC score:    (  ) Indwelling Chung Catheter:   Date insterted:    Reason (  ) Critical illness     (  ) urinary retention    (  ) Accurate Ins/Outs Monitoring     (  ) CMO patient    (  ) Central Line:   Date inserted:  Location: (  ) Right IJ     (  ) Left IJ     (  ) Right Fem     (  ) Left Fem    (  ) SPC        (  ) pigtail       (  ) PEG tube       (  ) colostomy       (  ) jejunostomy  (  ) U-Dall    LABS:                        8.9    17.12 )-----------( 207      ( 20 Nov 2023 04:54 )             25.8     11-20    128<L>  |  97<L>  |  33<H>  ----------------------------<  122<H>  3.4<L>   |  18  |  1.4    Ca    8.0<L>      20 Nov 2023 04:54  Mg     2.5     11-20    TPro  5.2<L>  /  Alb  3.7  /  TBili  34.0<HH>  /  DBili  x   /  AST  104<H>  /  ALT  12  /  AlkPhos  106  11-20    PT/INR - ( 20 Nov 2023 04:54 )   PT: 26.50 sec;   INR: 2.31 ratio         PTT - ( 20 Nov 2023 04:54 )  PTT:42.3 sec  Urinalysis Basic - ( 20 Nov 2023 04:54 )    Color: x / Appearance: x / SG: x / pH: x  Gluc: 122 mg/dL / Ketone: x  / Bili: x / Urobili: x   Blood: x / Protein: x / Nitrite: x   Leuk Esterase: x / RBC: x / WBC x   Sq Epi: x / Non Sq Epi: x / Bacteria: x                RADIOLOGY:           24H events:    Patient is a 28y old Male who presents with a chief complaint of cholestasis, abdominal ascites, hyponatremia (20 Nov 2023 09:50)    Primary diagnosis of Jaundice    Today is hospital day 4d. This morning patient was seen and examined at bedside, resting comfortably in bed.    No acute or major events overnight.    Code Status: full code    Family communication:  Contact date:  Name of person contacted:  Relationship to patient:  Communication details:  What matters most:    PAST MEDICAL & SURGICAL HISTORY  No significant past surgical history      SOCIAL HISTORY:  Social History:      ALLERGIES:  No Known Allergies    MEDICATIONS:  STANDING MEDICATIONS  chlorhexidine 2% Cloths 1 Application(s) Topical daily  folic acid 1 milliGRAM(s) Oral daily  hepatitis B Vaccine - Adult (ENGERIX-B) 20 MICROGram(s) IntraMuscular once  influenza   Vaccine 0.5 milliLiter(s) IntraMuscular once  lactulose Syrup 20 Gram(s) Oral three times a day  midodrine. 5 milliGRAM(s) Oral three times a day  octreotide  Injectable 100 MICROGram(s) SubCutaneous three times a day  pantoprazole    Tablet 40 milliGRAM(s) Oral before breakfast  thiamine 100 milliGRAM(s) Oral daily    PRN MEDICATIONS  LORazepam   Injectable 2 milliGRAM(s) IntraMuscular every 2 hours PRN  traMADol 25 milliGRAM(s) Oral every 8 hours PRN    VITALS:   T(F): 97.6  HR: 101  BP: 116/72  RR: 22  SpO2: 98%    PHYSICAL EXAM:  GENERAL:   (  ) NAD, lying in bed comfortably     (  ) obtunded     (  ) lethargic     (  ) somnolent    HEAD:   (  ) Atraumatic     (  ) hematoma     (  ) laceration (specify location:       )     NECK:  (  ) Supple     (  ) neck stiffness     (  ) nuchal rigidity     (  )  no JVD     (  ) JVD present ( -- cm)    HEART:  Rate -->     (  ) normal rate     (  ) bradycardic     (  ) tachycardic  Rhythm -->     (  ) regular     (  ) regularly irregular     (  ) irregularly irregular  Murmurs -->     (  ) normal s1s2     (  ) systolic murmur     (  ) diastolic murmur     (  ) continuous murmur      (  ) S3 present     (  ) S4 present    LUNGS:   (  )Unlabored respirations     (  ) tachypnea  (  ) B/L air entry     (  ) decreased breath sounds in:  (location     )    (  ) no adventitious sound     (  ) crackles     (  ) wheezing      (  ) rhonchi      (specify location:       )  (  ) chest wall tenderness (specify location:       )    ABDOMEN:   (  ) Soft     (  ) tense   |   (  ) nondistended     (  ) distended   |   (  ) +BS     (  ) hypoactive bowel sounds     (  ) hyperactive bowel sounds  (  ) nontender     (  ) RUQ tenderness     (  ) RLQ tenderness     (  ) LLQ tenderness     (  ) epigastric tenderness     (  ) diffuse tenderness  (  ) Splenomegaly      (  ) Hepatomegaly      (  ) Jaundice     (  ) ecchymosis     EXTREMITIES: 2+ peripheral pulses bilaterally. No clubbing, cyanosis, or edema  (  ) Normal     (  ) Rash     (  ) ecchymosis     (  ) varicose veins      (  ) pitting edema     (  ) non-pitting edema   (  ) ulceration     (  ) gangrene:     (location:     )    NERVOUS SYSTEM:    (  ) A&Ox3     (  ) confused     (  ) lethargic  CN II-XII:     (  ) Intact     (  ) deficits found     (Specify:     )   Upper extremities:     (  ) no sensorimotor deficits     (  ) weakness     (  ) loss of proprioception/vibration     (  ) loss of touch/temperature (specify:    )  Lower extremities:     (  ) no sensorimotor deficits     (  ) weakness     (  ) loss of proprioception/vibration     (  ) loss of touch/temperature (specify:    )    SKIN:   (  ) No rashes or lesions     (  ) maculopapular rash     (  ) pustules     (  ) vesicles     (  ) ulcer     (  ) ecchymosis     (specify location:     )    AMPAC score:    (  ) Indwelling Chung Catheter:   Date insterted:    Reason (  ) Critical illness     (  ) urinary retention    (  ) Accurate Ins/Outs Monitoring     (  ) CMO patient    (  ) Central Line:   Date inserted:  Location: (  ) Right IJ     (  ) Left IJ     (  ) Right Fem     (  ) Left Fem    (  ) SPC        (  ) pigtail       (  ) PEG tube       (  ) colostomy       (  ) jejunostomy  (  ) U-Dall    LABS:                        8.9    17.12 )-----------( 207      ( 20 Nov 2023 04:54 )             25.8     11-20    128<L>  |  97<L>  |  33<H>  ----------------------------<  122<H>  3.4<L>   |  18  |  1.4    Ca    8.0<L>      20 Nov 2023 04:54  Mg     2.5     11-20    TPro  5.2<L>  /  Alb  3.7  /  TBili  34.0<HH>  /  DBili  x   /  AST  104<H>  /  ALT  12  /  AlkPhos  106  11-20    PT/INR - ( 20 Nov 2023 04:54 )   PT: 26.50 sec;   INR: 2.31 ratio         PTT - ( 20 Nov 2023 04:54 )  PTT:42.3 sec  Urinalysis Basic - ( 20 Nov 2023 04:54 )    Color: x / Appearance: x / SG: x / pH: x  Gluc: 122 mg/dL / Ketone: x  / Bili: x / Urobili: x   Blood: x / Protein: x / Nitrite: x   Leuk Esterase: x / RBC: x / WBC x   Sq Epi: x / Non Sq Epi: x / Bacteria: x                RADIOLOGY:           24H events:    Patient is a 28y old Male who presents with a chief complaint of cholestasis, abdominal ascites, hyponatremia (20 Nov 2023 09:50)    Primary diagnosis of Jaundice    Today is hospital day 4d. This morning patient was seen and examined at bedside, resting comfortably in bed.    No acute or major events overnight.    Code Status: full code    Family communication:  Contact date:  Name of person contacted:  Relationship to patient:  Communication details:  What matters most:    PAST MEDICAL & SURGICAL HISTORY  No significant past surgical history      SOCIAL HISTORY:  Social History: alcohol use disorder      ALLERGIES:  No Known Allergies    MEDICATIONS:  STANDING MEDICATIONS  chlorhexidine 2% Cloths 1 Application(s) Topical daily  folic acid 1 milliGRAM(s) Oral daily  hepatitis B Vaccine - Adult (ENGERIX-B) 20 MICROGram(s) IntraMuscular once  influenza   Vaccine 0.5 milliLiter(s) IntraMuscular once  lactulose Syrup 20 Gram(s) Oral three times a day  midodrine. 5 milliGRAM(s) Oral three times a day  octreotide  Injectable 100 MICROGram(s) SubCutaneous three times a day  pantoprazole    Tablet 40 milliGRAM(s) Oral before breakfast  thiamine 100 milliGRAM(s) Oral daily    PRN MEDICATIONS  LORazepam   Injectable 2 milliGRAM(s) IntraMuscular every 2 hours PRN  traMADol 25 milliGRAM(s) Oral every 8 hours PRN    VITALS:   T(F): 97.6  HR: 101  BP: 116/72  RR: 22  SpO2: 98%    PHYSICAL EXAM:  GENERAL:   ( + ) NAD, lying in bed comfortably     (  ) obtunded     (  ) lethargic     (  ) somnolent    HEAD:   ( + ) Atraumatic     (  ) hematoma     (  ) laceration (specify location:       )     NECK:  (  ) Supple     (  ) neck stiffness     (  ) nuchal rigidity     (  )  no JVD     (  ) JVD present ( -- cm)    HEART:  Rate -->     (  ) normal rate     (  ) bradycardic     (  ) tachycardic  Rhythm -->     (  ) regular     (  ) regularly irregular     (  ) irregularly irregular  Murmurs -->     (  ) normal s1s2     (  ) systolic murmur     (  ) diastolic murmur     (  ) continuous murmur      (  ) S3 present     (  ) S4 present    LUNGS:   ( + )Unlabored respirations     (  ) tachypnea  ( + ) B/L air entry     (  ) decreased breath sounds in:  (location     )    (  ) no adventitious sound     (  ) crackles     (  ) wheezing      (  ) rhonchi      (specify location:       )  (  ) chest wall tenderness (specify location:       )    ABDOMEN:   ( + ) Soft     (  ) tense   |   (  ) nondistended     (+  ) distended   |   ( + ) +BS     (  ) hypoactive bowel sounds     (  ) hyperactive bowel sounds  ( + ) nontender     (  ) RUQ tenderness     (  ) RLQ tenderness     (  ) LLQ tenderness     (  ) epigastric tenderness     (  ) diffuse tenderness  (  ) Splenomegaly      ( + ) Hepatomegaly      ( + ) Jaundice     (  ) ecchymosis     EXTREMITIES: 2+ peripheral pulses bilaterally. No clubbing, cyanosis, or edema  (  ) Normal     (  ) Rash     (  ) ecchymosis     (  ) varicose veins      (  ) pitting edema     (  ) non-pitting edema   (  ) ulceration     (  ) gangrene:     (location:     )    NERVOUS SYSTEM:    ( + ) A&Ox3     (  ) confused     (  ) lethargic  CN II-XII:     ( + ) Intact     (  ) deficits found     (Specify:     )   Upper extremities:     (  ) no sensorimotor deficits     (  ) weakness     (  ) loss of proprioception/vibration     (  ) loss of touch/temperature (specify:    )  Lower extremities:     (  ) no sensorimotor deficits     (  ) weakness     (  ) loss of proprioception/vibration     (  ) loss of touch/temperature (specify:    )    SKIN:   ( + ) No rashes or lesions     (  ) maculopapular rash     (  ) pustules     (  ) vesicles     (  ) ulcer     (  ) ecchymosis     (specify location:     )    AMPAC score:    ( - ) Indwelling Chung Catheter:   Date insterted:    Reason (  ) Critical illness     (  ) urinary retention    (  ) Accurate Ins/Outs Monitoring     (  ) CMO patient    ( - ) Central Line:   Date inserted:  Location: (  ) Right IJ     (  ) Left IJ     (  ) Right Fem     (  ) Left Fem    (  ) SPC        (  ) pigtail       (  ) PEG tube       (  ) colostomy       (  ) jejunostomy  (  ) U-Dall    LABS:                        8.9    17.12 )-----------( 207      ( 20 Nov 2023 04:54 )             25.8     11-20    128<L>  |  97<L>  |  33<H>  ----------------------------<  122<H>  3.4<L>   |  18  |  1.4    Ca    8.0<L>      20 Nov 2023 04:54  Mg     2.5     11-20    TPro  5.2<L>  /  Alb  3.7  /  TBili  34.0<HH>  /  DBili  x   /  AST  104<H>  /  ALT  12  /  AlkPhos  106  11-20    PT/INR - ( 20 Nov 2023 04:54 )   PT: 26.50 sec;   INR: 2.31 ratio         PTT - ( 20 Nov 2023 04:54 )  PTT:42.3 sec  Urinalysis Basic - ( 20 Nov 2023 04:54 )    Color: x / Appearance: x / SG: x / pH: x  Gluc: 122 mg/dL / Ketone: x  / Bili: x / Urobili: x   Blood: x / Protein: x / Nitrite: x   Leuk Esterase: x / RBC: x / WBC x   Sq Epi: x / Non Sq Epi: x / Bacteria: x                RADIOLOGY:

## 2023-11-20 NOTE — PROGRESS NOTE ADULT - ASSESSMENT
29 yo male previously healthy presented today for worsening jaundice and abdominal distention   that started 2 weeks ago. patient was admitted for etoh hepatitis.    # Severe alcoholic  hepatitis MDF > 32  # ascites s/p paracentesis; abdomen distension+  # Decompensated liver  cirrhosis likely due to CATRACHO. varices in CT scan   # YANDY   # Coagulopathy   No encephalopathy   MELD3: 32 11/18. 11/19  MELD-Na: 34 11/18.11/19    - CT abd pelv: Diffuse large amount of abdominal and pelvic ascites. Hepatosplenomegaly with right paracolic varices, findings suspicious for portal hypertension.   - RUQ US : 1.  Enlarged fatty infiltrated liver (hepatomegaly with diffuse steatosis). Ascites is noted.  T dylon 32.6       ALT 11  11-19-23 @ 05:10  T dylon 33.0       ALT 10  11-18-23 @ 21:35  T dylon 31.5       ALT 12  11-18-23 @ 07:04  T dylon 30.5       ALT 14  11-17-23 @ 19:44  T dylon 34.3   AP --  AST --  ALT --  11-17-23 @ 18:26       Hepatitis A IgM, Hepatitis B core IgM, core Ab total, surface Ag, surface Ab, HCV antibody, HCV RNA  - negative  GIOVANY, AMA, anti-Smooth Muscle Ab type 1, Anti liver-kidney microsomal Ab, Anti-soluble liver Ag, immunoglobulin panel,  Quantiferon level, Iron studies and ceruloplasmin, CMV PCR, EBV PCR, HSV IgM  - pending  - s/p 3.2 L paracentesis , SBP ruled out    PLAN   Continue IV Human albumin 25 % 20 g  Hold prednisone with YANDY  Can stop fluid restriction.   Continue vitamin K IV x 3 doses total  With YANDY hold diuretics. Does not want spironolactone, so can use eplerenone and furosemide when creatinine improves.   c/w Midodrine 5mg TID, octreotide 100mg TID subq  Stop rifaximin  Can dc antibiotics course after 5 day course  Please send CMV IgM, CMV IgG, CMV PCR, Hep E IgG, HSA Ab, HIV, Syphilis scree, toxoplasma Ab. measles Ab, Mumps Ab, Rubella AB, Hep A total IgG, PeTH level, Utox and serum drug screen (LT workup)  Please give Hepatitis B vaccine as pt non immune  INR and LFT Q12   Please avoid hepatotoxic medications  Please notify hepatology with any change on mental status or worsening INR for immediate LT center transfer  Offered transfer to LT center but opted to be monitored here.  If encephalopathy or worsening coagulopathy will need urgent transfer .    # HCC screening:  - Please f/u as outpatient for US abdomen and AFP every 6 months.  - outpatient EGD for variceal screening    # Lifestyle/Dietary modifications  - Calorie intake 25-40 Kcal/kg/day  - Protein intake: 1.2-1.5 gm/kg/day  - Avoid smoking, alcohol, NSAIDS.  - Abstain from alcohol and all illicit drugs  -Avoid use of herbal and dietary supplements due to potential hepatotoxicity  -Limit use of acetaminophen to <2 grams per day  -Avoid use of nonsteroidal antiinflammatory drugs (NSAIDs)    -Avoid eating any unpasteurized dairy products; avoid eating any raw or undercooked eggs, fish, poultry, or meat; and avoid eating raw/steamed oysters or other shellfish to avoid risk of Vibrio infection.    #Vaccinations:  Please f/u in clinic for being uptodate with HAV/HBV/Influenza/Pneumococcal vaccines.    #LT evaluation  Social status: Lives with mother and brother. Brother volunteered to be a donor. 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 patient was admitted for etoh hepatitis.    # Severe alcoholic  hepatitis MDF > 32  # ascites s/p paracentesis; abdomen distension+  # Decompensated liver  cirrhosis likely due to CATRACHO. varices in CT scan   # YANDY - worsening  # Coagulopathy   No encephalopathy   MELD3: 32 11/18. 11/19; 33 (11/20)  MELD-Na: 34 11/18.11/19; 34 (11/20)    - CT abd pelv: Diffuse large amount of abdominal and pelvic ascites. Hepatosplenomegaly with right paracolic varices, findings suspicious for portal hypertension.   - RUQ US : 1.  Enlarged fatty infiltrated liver (hepatomegaly with diffuse steatosis). Ascites is noted.  T dylon 32.6       ALT 11  11-19-23 @ 05:10  T dylon 33.0       ALT 10  11-18-23 @ 21:35  T dylon 31.5       ALT 12  11-18-23 @ 07:04  T dylon 30.5       ALT 14  11-17-23 @ 19:44  T dylon 34.3   AP --  AST --  ALT --  11-17-23 @ 18:26       Hepatitis A IgM, Hepatitis B core IgM, core Ab total, surface Ag, surface Ab, HCV antibody, HCV RNA  - negative  GIOVANY, AMA, anti-Smooth Muscle Ab type 1, Anti liver-kidney microsomal Ab, Anti-soluble liver Ag, immunoglobulin panel,  Quantiferon level, Iron studies and ceruloplasmin, CMV PCR, EBV PCR, HSV IgM  - pending  - s/p 3.2 L paracentesis , SBP ruled out  - pt refusing octreotide    PLAN   Continue IV Human albumin 25 % 20 g  Hold prednisone with YANDY  Can stop fluid restriction.   Continue vitamin K IV x 3 doses total  With YANDY hold diuretics. Does not want spironolactone, so can use eplerenone and furosemide when creatinine improves.   c/w Midodrine 5mg TID, octreotide 100mg TID subq  Stop rifaximin  Can dc antibiotics course after 5 day course  Please send CMV IgM, CMV IgG, CMV PCR, Hep E IgG, HSA Ab, HIV, Syphilis scree, toxoplasma Ab. measles Ab, Mumps Ab, Rubella AB, Hep A total IgG, PeTH level, Utox and serum drug screen (LT workup)  Please give Hepatitis B vaccine as pt non immune  INR and LFT Q12   Please avoid hepatotoxic medications  Please notify hepatology with any change on mental status or worsening INR for immediate LT center transfer  Offered transfer to LT center but opted to be monitored here.  If encephalopathy or worsening coagulopathy will need urgent transfer .    # HCC screening:  - Please f/u as outpatient for US abdomen and AFP every 6 months.  - outpatient EGD for variceal screening    # Lifestyle/Dietary modifications  - Calorie intake 25-40 Kcal/kg/day  - Protein intake: 1.2-1.5 gm/kg/day  - Avoid smoking, alcohol, NSAIDS.  - Abstain from alcohol and all illicit drugs  -Avoid use of herbal and dietary supplements due to potential hepatotoxicity  -Limit use of acetaminophen to <2 grams per day  -Avoid use of nonsteroidal antiinflammatory drugs (NSAIDs)    -Avoid eating any unpasteurized dairy products; avoid eating any raw or undercooked eggs, fish, poultry, or meat; and avoid eating raw/steamed oysters or other shellfish to avoid risk of Vibrio infection.    #Vaccinations:  Please f/u in clinic for being uptodate with HAV/HBV/Influenza/Pneumococcal vaccines.    #LT evaluation  Social status: Lives with mother and brother. Brother volunteered to be a donor. 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 patient was admitted for etoh hepatitis.    # Severe alcoholic  hepatitis MDF > 32  # ascites s/p paracentesis; abdomen distension+  # Decompensated liver  cirrhosis likely due to CATRACHO. varices in CT scan   # YANDY - worsening  # Coagulopathy   No encephalopathy   MELD3: 32 11/18. 11/19; 33 (11/20)  MELD-Na: 34 11/18.11/19; 34 (11/20)    - CT abd pelv: Diffuse large amount of abdominal and pelvic ascites. Hepatosplenomegaly with right paracolic varices, findings suspicious for portal hypertension.   - RUQ US : 1.  Enlarged fatty infiltrated liver (hepatomegaly with diffuse steatosis). Ascites is noted.  T dylon 32.6       ALT 11  11-19-23 @ 05:10  T dylon 33.0       ALT 10  11-18-23 @ 21:35  T dylon 31.5       ALT 12  11-18-23 @ 07:04  T dylon 30.5       ALT 14  11-17-23 @ 19:44  T dylon 34.3   AP --  AST --  ALT --  11-17-23 @ 18:26       Hepatitis A IgM, Hepatitis B core IgM, core Ab total, surface Ag, surface Ab, HCV antibody, HCV RNA  - negative  GIOVANY, AMA, anti-Smooth Muscle Ab type 1, Anti liver-kidney microsomal Ab, Anti-soluble liver Ag, immunoglobulin panel,  Quantiferon level, Iron studies and ceruloplasmin, CMV PCR, EBV PCR, HSV IgM  - pending  - s/p 3.2 L paracentesis , SBP ruled out  - pt refusing octreotide  - CMV IgM, CMV IgG, CMV PCR, Hep E IgG, HSA Ab, HIV, Syphilis scree, toxoplasma Ab. measles Ab, Mumps Ab, Rubella AB, Hep A total IgG, PeTH level, Utox and serum drug screen (LT workup)    PLAN   Continue IV Human albumin 25 % 20 g  Hold prednisone with YANDY  Can stop fluid restriction.   Continue vitamin K IV x 3 doses total; recommend paracentesis in am will need to give albumin 6-8g/L removed   With YANDY hold diuretics. Does not want spironolactone, so can use eplerenone and furosemide when creatinine improves.   c/w Midodrine 5mg TID, octreotide 100mg TID subq (refusing), offered terlipressin but pt would like to read about it  Please give Hepatitis B vaccine as pt non immune  INR and LFT Q12   Please avoid hepatotoxic medications  Please notify hepatology with any change on mental status or worsening INR for immediate LT center transfer  Offered transfer to LT center but opted to be monitored here. Adamantly refusing at this time  If encephalopathy or worsening coagulopathy will need urgent transfer .    # HCC screening:  - Please f/u as outpatient for US abdomen and AFP every 6 months.  - outpatient EGD for variceal screening    # Lifestyle/Dietary modifications  - Calorie intake 25-40 Kcal/kg/day  - Protein intake: 1.2-1.5 gm/kg/day  - Avoid smoking, alcohol, NSAIDS.  - Abstain from alcohol and all illicit drugs  -Avoid use of herbal and dietary supplements due to potential hepatotoxicity  -Limit use of acetaminophen to <2 grams per day  -Avoid use of nonsteroidal antiinflammatory drugs (NSAIDs)    -Avoid eating any unpasteurized dairy products; avoid eating any raw or undercooked eggs, fish, poultry, or meat; and avoid eating raw/steamed oysters or other shellfish to avoid risk of Vibrio infection.    #Vaccinations:  Please f/u in clinic for being uptodate with HAV/HBV/Influenza/Pneumococcal vaccines.    #LT evaluation  Social status: Lives with mother and brother. Brother volunteered to be a donor. 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

## 2023-11-21 LAB
ALBUMIN SERPL ELPH-MCNC: 3.2 G/DL — LOW (ref 3.5–5.2)
ALBUMIN SERPL ELPH-MCNC: 3.2 G/DL — LOW (ref 3.5–5.2)
ALBUMIN SERPL ELPH-MCNC: 3.5 G/DL — SIGNIFICANT CHANGE UP (ref 3.5–5.2)
ALBUMIN SERPL ELPH-MCNC: 3.5 G/DL — SIGNIFICANT CHANGE UP (ref 3.5–5.2)
ALP SERPL-CCNC: 134 U/L — HIGH (ref 30–115)
ALP SERPL-CCNC: 134 U/L — HIGH (ref 30–115)
ALP SERPL-CCNC: 143 U/L — HIGH (ref 30–115)
ALP SERPL-CCNC: 143 U/L — HIGH (ref 30–115)
ALT FLD-CCNC: 12 U/L — SIGNIFICANT CHANGE UP (ref 0–41)
ALT FLD-CCNC: 12 U/L — SIGNIFICANT CHANGE UP (ref 0–41)
ALT FLD-CCNC: 14 U/L — SIGNIFICANT CHANGE UP (ref 0–41)
ALT FLD-CCNC: 14 U/L — SIGNIFICANT CHANGE UP (ref 0–41)
ANA TITR SER: NEGATIVE — SIGNIFICANT CHANGE UP
ANA TITR SER: NEGATIVE — SIGNIFICANT CHANGE UP
ANION GAP SERPL CALC-SCNC: 18 MMOL/L — HIGH (ref 7–14)
ANION GAP SERPL CALC-SCNC: 18 MMOL/L — HIGH (ref 7–14)
AST SERPL-CCNC: 112 U/L — HIGH (ref 0–41)
AST SERPL-CCNC: 112 U/L — HIGH (ref 0–41)
AST SERPL-CCNC: 121 U/L — HIGH (ref 0–41)
AST SERPL-CCNC: 121 U/L — HIGH (ref 0–41)
BASOPHILS # BLD AUTO: 0.1 K/UL — SIGNIFICANT CHANGE UP (ref 0–0.2)
BASOPHILS # BLD AUTO: 0.1 K/UL — SIGNIFICANT CHANGE UP (ref 0–0.2)
BASOPHILS NFR BLD AUTO: 0.6 % — SIGNIFICANT CHANGE UP (ref 0–1)
BASOPHILS NFR BLD AUTO: 0.6 % — SIGNIFICANT CHANGE UP (ref 0–1)
BILIRUB DIRECT SERPL-MCNC: 20.6 MG/DL — HIGH (ref 0–0.3)
BILIRUB DIRECT SERPL-MCNC: 20.6 MG/DL — HIGH (ref 0–0.3)
BILIRUB INDIRECT FLD-MCNC: 12.2 MG/DL — HIGH (ref 0.2–1.2)
BILIRUB INDIRECT FLD-MCNC: 12.2 MG/DL — HIGH (ref 0.2–1.2)
BILIRUB SERPL-MCNC: 32.3 MG/DL — CRITICAL HIGH (ref 0.2–1.2)
BILIRUB SERPL-MCNC: 32.3 MG/DL — CRITICAL HIGH (ref 0.2–1.2)
BILIRUB SERPL-MCNC: 32.8 MG/DL — CRITICAL HIGH (ref 0.2–1.2)
BILIRUB SERPL-MCNC: 32.8 MG/DL — CRITICAL HIGH (ref 0.2–1.2)
BUN SERPL-MCNC: 39 MG/DL — HIGH (ref 10–20)
BUN SERPL-MCNC: 39 MG/DL — HIGH (ref 10–20)
CALCIUM SERPL-MCNC: 7.9 MG/DL — LOW (ref 8.4–10.4)
CALCIUM SERPL-MCNC: 7.9 MG/DL — LOW (ref 8.4–10.4)
CHLORIDE SERPL-SCNC: 94 MMOL/L — LOW (ref 98–110)
CHLORIDE SERPL-SCNC: 94 MMOL/L — LOW (ref 98–110)
CO2 SERPL-SCNC: 15 MMOL/L — LOW (ref 17–32)
CO2 SERPL-SCNC: 15 MMOL/L — LOW (ref 17–32)
CREAT SERPL-MCNC: 1.4 MG/DL — SIGNIFICANT CHANGE UP (ref 0.7–1.5)
CREAT SERPL-MCNC: 1.4 MG/DL — SIGNIFICANT CHANGE UP (ref 0.7–1.5)
CULTURE RESULTS: SIGNIFICANT CHANGE UP
EGFR: 69 ML/MIN/1.73M2 — SIGNIFICANT CHANGE UP
EGFR: 69 ML/MIN/1.73M2 — SIGNIFICANT CHANGE UP
EOSINOPHIL # BLD AUTO: 0.24 K/UL — SIGNIFICANT CHANGE UP (ref 0–0.7)
EOSINOPHIL # BLD AUTO: 0.24 K/UL — SIGNIFICANT CHANGE UP (ref 0–0.7)
EOSINOPHIL NFR BLD AUTO: 1.3 % — SIGNIFICANT CHANGE UP (ref 0–8)
EOSINOPHIL NFR BLD AUTO: 1.3 % — SIGNIFICANT CHANGE UP (ref 0–8)
GAMMA INTERFERON BACKGROUND BLD IA-ACNC: 0.02 IU/ML — SIGNIFICANT CHANGE UP
GAMMA INTERFERON BACKGROUND BLD IA-ACNC: 0.02 IU/ML — SIGNIFICANT CHANGE UP
GLUCOSE BLDC GLUCOMTR-MCNC: 125 MG/DL — HIGH (ref 70–99)
GLUCOSE BLDC GLUCOMTR-MCNC: 125 MG/DL — HIGH (ref 70–99)
GLUCOSE SERPL-MCNC: 123 MG/DL — HIGH (ref 70–99)
GLUCOSE SERPL-MCNC: 123 MG/DL — HIGH (ref 70–99)
HAV IGG SER QL IA: SIGNIFICANT CHANGE UP
HAV IGG SER QL IA: SIGNIFICANT CHANGE UP
HCT VFR BLD CALC: 27.9 % — LOW (ref 42–52)
HCT VFR BLD CALC: 27.9 % — LOW (ref 42–52)
HGB BLD-MCNC: 9.7 G/DL — LOW (ref 14–18)
HGB BLD-MCNC: 9.7 G/DL — LOW (ref 14–18)
HIV 1+2 AB+HIV1 P24 AG SERPL QL IA: SIGNIFICANT CHANGE UP
HIV 1+2 AB+HIV1 P24 AG SERPL QL IA: SIGNIFICANT CHANGE UP
IMM GRANULOCYTES NFR BLD AUTO: 1.5 % — HIGH (ref 0.1–0.3)
IMM GRANULOCYTES NFR BLD AUTO: 1.5 % — HIGH (ref 0.1–0.3)
INR BLD: 2.33 RATIO — HIGH (ref 0.65–1.3)
INR BLD: 2.33 RATIO — HIGH (ref 0.65–1.3)
LKM AB SER-ACNC: <20.1 UNITS — SIGNIFICANT CHANGE UP (ref 0–20)
LKM AB SER-ACNC: <20.1 UNITS — SIGNIFICANT CHANGE UP (ref 0–20)
LYMPHOCYTES # BLD AUTO: 1.73 K/UL — SIGNIFICANT CHANGE UP (ref 1.2–3.4)
LYMPHOCYTES # BLD AUTO: 1.73 K/UL — SIGNIFICANT CHANGE UP (ref 1.2–3.4)
LYMPHOCYTES # BLD AUTO: 9.6 % — LOW (ref 20.5–51.1)
LYMPHOCYTES # BLD AUTO: 9.6 % — LOW (ref 20.5–51.1)
M TB IFN-G BLD-IMP: NEGATIVE — SIGNIFICANT CHANGE UP
M TB IFN-G BLD-IMP: NEGATIVE — SIGNIFICANT CHANGE UP
M TB IFN-G CD4+ BCKGRND COR BLD-ACNC: 0.01 IU/ML — SIGNIFICANT CHANGE UP
M TB IFN-G CD4+ BCKGRND COR BLD-ACNC: 0.01 IU/ML — SIGNIFICANT CHANGE UP
M TB IFN-G CD4+CD8+ BCKGRND COR BLD-ACNC: 0.01 IU/ML — SIGNIFICANT CHANGE UP
M TB IFN-G CD4+CD8+ BCKGRND COR BLD-ACNC: 0.01 IU/ML — SIGNIFICANT CHANGE UP
MAGNESIUM SERPL-MCNC: 2.3 MG/DL — SIGNIFICANT CHANGE UP (ref 1.8–2.4)
MAGNESIUM SERPL-MCNC: 2.3 MG/DL — SIGNIFICANT CHANGE UP (ref 1.8–2.4)
MCHC RBC-ENTMCNC: 34.8 G/DL — SIGNIFICANT CHANGE UP (ref 32–37)
MCHC RBC-ENTMCNC: 34.8 G/DL — SIGNIFICANT CHANGE UP (ref 32–37)
MCHC RBC-ENTMCNC: 35.4 PG — HIGH (ref 27–31)
MCHC RBC-ENTMCNC: 35.4 PG — HIGH (ref 27–31)
MCV RBC AUTO: 101.8 FL — HIGH (ref 80–94)
MCV RBC AUTO: 101.8 FL — HIGH (ref 80–94)
MONOCYTES # BLD AUTO: 1.06 K/UL — HIGH (ref 0.1–0.6)
MONOCYTES # BLD AUTO: 1.06 K/UL — HIGH (ref 0.1–0.6)
MONOCYTES NFR BLD AUTO: 5.9 % — SIGNIFICANT CHANGE UP (ref 1.7–9.3)
MONOCYTES NFR BLD AUTO: 5.9 % — SIGNIFICANT CHANGE UP (ref 1.7–9.3)
NEUTROPHILS # BLD AUTO: 14.68 K/UL — HIGH (ref 1.4–6.5)
NEUTROPHILS # BLD AUTO: 14.68 K/UL — HIGH (ref 1.4–6.5)
NEUTROPHILS NFR BLD AUTO: 81.1 % — HIGH (ref 42.2–75.2)
NEUTROPHILS NFR BLD AUTO: 81.1 % — HIGH (ref 42.2–75.2)
NRBC # BLD: 0 /100 WBCS — SIGNIFICANT CHANGE UP (ref 0–0)
NRBC # BLD: 0 /100 WBCS — SIGNIFICANT CHANGE UP (ref 0–0)
PCP SPEC-MCNC: SIGNIFICANT CHANGE UP
PCP SPEC-MCNC: SIGNIFICANT CHANGE UP
PLATELET # BLD AUTO: 214 K/UL — SIGNIFICANT CHANGE UP (ref 130–400)
PLATELET # BLD AUTO: 214 K/UL — SIGNIFICANT CHANGE UP (ref 130–400)
PMV BLD: 10.5 FL — HIGH (ref 7.4–10.4)
PMV BLD: 10.5 FL — HIGH (ref 7.4–10.4)
POTASSIUM SERPL-MCNC: 3.8 MMOL/L — SIGNIFICANT CHANGE UP (ref 3.5–5)
POTASSIUM SERPL-MCNC: 3.8 MMOL/L — SIGNIFICANT CHANGE UP (ref 3.5–5)
POTASSIUM SERPL-SCNC: 3.8 MMOL/L — SIGNIFICANT CHANGE UP (ref 3.5–5)
POTASSIUM SERPL-SCNC: 3.8 MMOL/L — SIGNIFICANT CHANGE UP (ref 3.5–5)
PROCALCITONIN SERPL-MCNC: 1.77 NG/ML — HIGH (ref 0.02–0.1)
PROCALCITONIN SERPL-MCNC: 1.77 NG/ML — HIGH (ref 0.02–0.1)
PROT SERPL-MCNC: 4.9 G/DL — LOW (ref 6–8)
PROT SERPL-MCNC: 4.9 G/DL — LOW (ref 6–8)
PROT SERPL-MCNC: 5 G/DL — LOW (ref 6–8)
PROT SERPL-MCNC: 5 G/DL — LOW (ref 6–8)
PROTHROM AB SERPL-ACNC: 26.8 SEC — HIGH (ref 9.95–12.87)
PROTHROM AB SERPL-ACNC: 26.8 SEC — HIGH (ref 9.95–12.87)
QUANT TB PLUS MITOGEN MINUS NIL: 0.91 IU/ML — SIGNIFICANT CHANGE UP
QUANT TB PLUS MITOGEN MINUS NIL: 0.91 IU/ML — SIGNIFICANT CHANGE UP
RBC # BLD: 2.74 M/UL — LOW (ref 4.7–6.1)
RBC # BLD: 2.74 M/UL — LOW (ref 4.7–6.1)
RBC # FLD: 17.8 % — HIGH (ref 11.5–14.5)
RBC # FLD: 17.8 % — HIGH (ref 11.5–14.5)
SODIUM SERPL-SCNC: 127 MMOL/L — LOW (ref 135–146)
SODIUM SERPL-SCNC: 127 MMOL/L — LOW (ref 135–146)
SOLUBLE LIVER IGG SER IA-ACNC: 0.5 — SIGNIFICANT CHANGE UP (ref 0–20)
SOLUBLE LIVER IGG SER IA-ACNC: 0.5 — SIGNIFICANT CHANGE UP (ref 0–20)
SPECIMEN SOURCE: SIGNIFICANT CHANGE UP
WBC # BLD: 18.09 K/UL — HIGH (ref 4.8–10.8)
WBC # BLD: 18.09 K/UL — HIGH (ref 4.8–10.8)
WBC # FLD AUTO: 18.09 K/UL — HIGH (ref 4.8–10.8)
WBC # FLD AUTO: 18.09 K/UL — HIGH (ref 4.8–10.8)

## 2023-11-21 PROCEDURE — 99233 SBSQ HOSP IP/OBS HIGH 50: CPT

## 2023-11-21 PROCEDURE — 76700 US EXAM ABDOM COMPLETE: CPT | Mod: 26

## 2023-11-21 RX ADMIN — OCTREOTIDE ACETATE 100 MICROGRAM(S): 200 INJECTION, SOLUTION INTRAVENOUS; SUBCUTANEOUS at 22:00

## 2023-11-21 RX ADMIN — LACTULOSE 20 GRAM(S): 10 SOLUTION ORAL at 22:00

## 2023-11-21 RX ADMIN — LACTULOSE 20 GRAM(S): 10 SOLUTION ORAL at 13:24

## 2023-11-21 RX ADMIN — CHLORHEXIDINE GLUCONATE 1 APPLICATION(S): 213 SOLUTION TOPICAL at 12:30

## 2023-11-21 RX ADMIN — PANTOPRAZOLE SODIUM 40 MILLIGRAM(S): 20 TABLET, DELAYED RELEASE ORAL at 05:09

## 2023-11-21 RX ADMIN — Medication 1 MILLIGRAM(S): at 12:30

## 2023-11-21 RX ADMIN — LACTULOSE 20 GRAM(S): 10 SOLUTION ORAL at 05:09

## 2023-11-21 RX ADMIN — Medication 100 MILLIGRAM(S): at 12:30

## 2023-11-21 NOTE — PROGRESS NOTE ADULT - SUBJECTIVE AND OBJECTIVE BOX
SARABIAWILLIAM HERMAN  28y Male    CHIEF COMPLAINT:    Patient is a 28y old  Male who presents with a chief complaint of cholestasis, abdominal ascites, hyponatremia (2023 08:14)    INTERVAL HPI/OVERNIGHT EVENTS:    Patient seen and examined. No acute events overnight. Overall unchanged     ROS: All other systems are negative.    Vital Signs:    T(F): 97.9 (23 @ 07:23), Max: 97.9 (23 @ 07:23)  HR: 100 (23 @ 07:23) (99 - 105)  BP: 114/57 (23 @ 07:23) (103/59 - 117/67)  RR: 20 (23 @ 07:23) (20 - 22)  SpO2: 100% (23 @ 07:23) (98% - 100%)    Daily Weight in k.1 (2023 00:00)    POCT Blood Glucose.: 129 mg/dL (2023 17:50)    PHYSICAL EXAM:    GENERAL:  NAD  SKIN: No rashes or lesions Jaundiced   HEENT: Atraumatic. Normocephalic.   NECK: Supple, No JVD.    PULMONARY: CTA B/L. No wheezing. No rales  CVS: Normal S1, S2. Rate and Rhythm are regular.   ABDOMEN/GI: Soft, Nontender, distended; BS present  MSK:  No edema B/L LE. No clubbing or cyanosis   NEUROLOGIC: moves all extremities   PSYCH: Alert & oriented x 3, normal affect    Consultant(s) Notes Reviewed:  [x ] YES  [ ] NO  Care Discussed with Consultants/Other Providers [ x] YES  [ ] NO    LABS:                        9.7    18.09 )-----------( 214      ( 2023 05:11 )             27.9     127<L>  |  94<L>  |  39<H>  ----------------------------<  123<H>  3.8   |  15<L>  |  1.4    Ca    7.9<L>      2023 05:11  Mg     2.3     11-    TPro  5.0<L>  /  Alb  3.5  /  TBili  32.3<HH>  /  DBili  x   /  AST  121<H>  /  ALT  14  /  AlkPhos  143<H>  11-21    PT/INR - ( 2023 21:35 )   PT: 25.50 sec;   INR: 2.22 ratio         PTT - ( 2023 21:35 )  PTT:41.6 sec    Culture - Blood (collected 2023 11:56)  Source: .Blood None  Preliminary Report (2023 22:02):    No growth at 24 hours    RADIOLOGY & ADDITIONAL TESTS:  Imaging or report Personally Reviewed:  [x] YES  [ ] NO  EKG reviewed: [x] YES  [ ] NO    Medications:  Standing  chlorhexidine 2% Cloths 1 Application(s) Topical daily  folic acid 1 milliGRAM(s) Oral daily  hepatitis B Vaccine - Adult (ENGERIX-B) 20 MICROGram(s) IntraMuscular once  influenza   Vaccine 0.5 milliLiter(s) IntraMuscular once  lactulose Syrup 20 Gram(s) Oral three times a day  midodrine. 5 milliGRAM(s) Oral three times a day  octreotide  Injectable 100 MICROGram(s) SubCutaneous three times a day  pantoprazole    Tablet 40 milliGRAM(s) Oral before breakfast  thiamine 100 milliGRAM(s) Oral daily    PRN Meds  LORazepam   Injectable 2 milliGRAM(s) IntraMuscular every 2 hours PRN  traMADol 25 milliGRAM(s) Oral every 8 hours PRN

## 2023-11-21 NOTE — PROGRESS NOTE ADULT - ASSESSMENT
27 yo male previously healthy presented today for worsening jaundice and abdominal distention   that started 2 weeks ago. patient was admitted for etoh hepatitis.    # Severe alcoholic  hepatitis MDF > 32  # ascites s/p paracentesis; abdomen distension+  # Decompensated liver  cirrhosis likely due to CATRACHO. varices in CT scan   # YANDY - worsening  # Coagulopathy   No encephalopathy   MELD3: 32 11/18. 11/19; 33 (11/20)  MELD-Na: 34 11/18.11/19; 34 (11/20)    - CT abd pelv: Diffuse large amount of abdominal and pelvic ascites. Hepatosplenomegaly with right paracolic varices, findings suspicious for portal hypertension.   - RUQ US : 1.  Enlarged fatty infiltrated liver (hepatomegaly with diffuse steatosis). Ascites is noted.  T dylon 32.6       ALT 11  11-19-23 @ 05:10  T dylon 33.0       ALT 10  11-18-23 @ 21:35  T dylon 31.5       ALT 12  11-18-23 @ 07:04  T dylon 30.5       ALT 14  11-17-23 @ 19:44  T dylon 34.3   AP --  AST --  ALT --  11-17-23 @ 18:26       Hepatitis A IgM, Hepatitis B core IgM, core Ab total, surface Ag, surface Ab, HCV antibody, HCV RNA  - negative  GIOVANY, AMA, anti-Smooth Muscle Ab type 1, Anti liver-kidney microsomal Ab, Anti-soluble liver Ag, immunoglobulin panel,  Quantiferon level, Iron studies and ceruloplasmin, CMV PCR, EBV PCR, HSV IgM  - pending  - s/p 3.2 L paracentesis , SBP ruled out  - pt refusing octreotide  - CMV IgM, CMV IgG, CMV PCR, Hep E IgG, HSA Ab, HIV, Syphilis scree, toxoplasma Ab. measles Ab, Mumps Ab, Rubella AB, Hep A total IgG, PeTH level, Utox and serum drug screen (LT workup)    PLAN   recommend paracentesis in am will need to give albumin 6-8g/L removed   Hold prednisone with YANDY  Can stop fluid restriction.   Continue vitamin K IV x 3 doses total; With YANDY hold diuretics. Does not want spironolactone, so can use eplerenone and furosemide when creatinine improves.   c/w Midodrine 5mg TID, octreotide 100mg TID subq (refusing), offered terlipressin but pt would like to read about it  Please give Hepatitis B vaccine as pt non immune  INR and LFT Q12   Please avoid hepatotoxic medications  Please notify hepatology with any change on mental status or worsening INR for immediate LT center transfer  Offered transfer to LT center but opted to be monitored here. Adamantly refusing at this time  If encephalopathy or worsening coagulopathy will need urgent transfer .    # HCC screening:  - Please f/u as outpatient for US abdomen and AFP every 6 months.  - outpatient EGD for variceal screening    # Lifestyle/Dietary modifications  - Calorie intake 25-40 Kcal/kg/day  - Protein intake: 1.2-1.5 gm/kg/day  - Avoid smoking, alcohol, NSAIDS.  - Abstain from alcohol and all illicit drugs  -Avoid use of herbal and dietary supplements due to potential hepatotoxicity  -Limit use of acetaminophen to <2 grams per day  -Avoid use of nonsteroidal antiinflammatory drugs (NSAIDs)    -Avoid eating any unpasteurized dairy products; avoid eating any raw or undercooked eggs, fish, poultry, or meat; and avoid eating raw/steamed oysters or other shellfish to avoid risk of Vibrio infection.    #Vaccinations:  Please f/u in clinic for being uptodate with HAV/HBV/Influenza/Pneumococcal vaccines.    #LT evaluation  Social status: Lives with mother and brother. Brother volunteered to be a donor. 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 patient was admitted for etoh hepatitis.    # Severe alcoholic  hepatitis MDF > 32  # ascites s/p paracentesis; abdomen distension+  # Decompensated liver  cirrhosis likely due to CATRACHO. varices in CT scan   # YANDY - worsening  # Coagulopathy   No encephalopathy   MELD3: 32 11/18. 11/19; 33 (11/20)  MELD-Na: 34 11/18.11/19; 34 (11/20)    - CT abd pelv: Diffuse large amount of abdominal and pelvic ascites. Hepatosplenomegaly with right paracolic varices, findings suspicious for portal hypertension.   - RUQ US : 1.  Enlarged fatty infiltrated liver (hepatomegaly with diffuse steatosis). Ascites is noted.  T dylon 32.6       ALT 11  11-19-23 @ 05:10  T dylon 33.0       ALT 10  11-18-23 @ 21:35  T dylon 31.5       ALT 12  11-18-23 @ 07:04  T dylon 30.5       ALT 14  11-17-23 @ 19:44  T dylon 34.3   AP --  AST --  ALT --  11-17-23 @ 18:26       Hepatitis A IgM, Hepatitis B core IgM, core Ab total, surface Ag, surface Ab, HCV antibody, HCV RNA  - negative  GIOVANY, AMA, anti-Smooth Muscle Ab type 1, Anti liver-kidney microsomal Ab, Anti-soluble liver Ag, immunoglobulin panel,  Quantiferon level, Iron studies and ceruloplasmin, CMV PCR, EBV PCR, HSV IgM  - pending  - s/p 3.2 L paracentesis , SBP ruled out  - pt refusing octreotide  - CMV IgM, CMV IgG, CMV PCR, Hep E IgG, HSA Ab, HIV, Syphilis scree, toxoplasma Ab. measles Ab, Mumps Ab, Rubella AB, Hep A total IgG, PeTH level, Utox and serum drug screen (LT workup)    PLAN   recommend paracentesis in am will need to give albumin 6-8g/L removed   Hold prednisone with YANDY  Can stop fluid restriction.   Continue vitamin K IV x 3 doses total; With YANDY hold diuretics. Does not want spironolactone, so can use eplerenone and furosemide when creatinine improves.   c/w Midodrine 5mg TID, octreotide 100mg TID subq (agreeable), offered terlipressin but pt would like to hold off given side effects  Please give Hepatitis B vaccine as pt non immune  INR and LFT Q12   Please avoid hepatotoxic medications  Please notify hepatology with any change on mental status or worsening INR for immediate LT center transfer  Offered transfer to LT center but opted to be monitored here. Adamantly refusing at this time  If encephalopathy or worsening coagulopathy will need urgent transfer .    # HCC screening:  - Please f/u as outpatient for US abdomen and AFP every 6 months.  - outpatient EGD for variceal screening    # Lifestyle/Dietary modifications  - Calorie intake 25-40 Kcal/kg/day  - Protein intake: 1.2-1.5 gm/kg/day  - Avoid smoking, alcohol, NSAIDS.  - Abstain from alcohol and all illicit drugs  -Avoid use of herbal and dietary supplements due to potential hepatotoxicity  -Limit use of acetaminophen to <2 grams per day  -Avoid use of nonsteroidal antiinflammatory drugs (NSAIDs)    -Avoid eating any unpasteurized dairy products; avoid eating any raw or undercooked eggs, fish, poultry, or meat; and avoid eating raw/steamed oysters or other shellfish to avoid risk of Vibrio infection.    #Vaccinations:  Please f/u in clinic for being uptodate with HAV/HBV/Influenza/Pneumococcal vaccines.    #LT evaluation  Social status: Lives with mother and brother. Brother volunteered to be a donor. 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 patient was admitted for etoh hepatitis.    # Severe alcoholic  hepatitis MDF > 32  # ascites s/p paracentesis; abdomen distension+  # Decompensated liver  cirrhosis likely due to CATRACHO. varices in CT scan   # YANDY - worsening  # Coagulopathy   No encephalopathy   MELD3: 32 11/18. 11/19; 33 (11/20)  MELD-Na: 34 11/18.11/19; 34 (11/20)    - CT abd pelv: Diffuse large amount of abdominal and pelvic ascites. Hepatosplenomegaly with right paracolic varices, findings suspicious for portal hypertension.   - RUQ US : 1.  Enlarged fatty infiltrated liver (hepatomegaly with diffuse steatosis). Ascites is noted.  T dylon 32.6       ALT 11  11-19-23 @ 05:10  T dylon 33.0       ALT 10  11-18-23 @ 21:35  T dylon 31.5       ALT 12  11-18-23 @ 07:04  T dylon 30.5       ALT 14  11-17-23 @ 19:44  T dylon 34.3   AP --  AST --  ALT --  11-17-23 @ 18:26       Hepatitis A IgM, Hepatitis B core IgM, core Ab total, surface Ag, surface Ab, HCV antibody, HCV RNA  - negative  GIOVANY, AMA, anti-Smooth Muscle Ab type 1, Anti liver-kidney microsomal Ab, Anti-soluble liver Ag, immunoglobulin panel,  Quantiferon level, Iron studies and ceruloplasmin, CMV PCR, EBV PCR, HSV IgM  - pending  - s/p 3.2 L paracentesis , SBP ruled out  - CMV IgM, CMV IgG, CMV PCR, Hep E IgG, HSA Ab, HIV, Syphilis scree, toxoplasma Ab. measles Ab, Mumps Ab, Rubella AB, Hep A total IgG, PeTH level, Utox and serum drug screen (LT workup)    PLAN   recommend paracentesis in am will need to give albumin 6-8g/L removed   Hold prednisone with YANDY  Can stop fluid restriction.   Continue vitamin K IV x 3 doses total; With YANDY hold diuretics. Does not want spironolactone, so can use eplerenone and furosemide when creatinine improves.   c/w Midodrine 5mg TID, octreotide 100mg TID subq (agreeable), offered terlipressin but pt would like to hold off given side effects  Please give Hepatitis B vaccine as pt non immune  INR and LFT Q12   Please avoid hepatotoxic medications  Please notify hepatology with any change on mental status or worsening INR for immediate LT center transfer  Offered transfer to LT center but opted to be monitored here. Adamantly refusing at this time  If encephalopathy or worsening coagulopathy will need urgent transfer .    # HCC screening:  - Please f/u as outpatient for US abdomen and AFP every 6 months.  - outpatient EGD for variceal screening    # Lifestyle/Dietary modifications  - Calorie intake 25-40 Kcal/kg/day  - Protein intake: 1.2-1.5 gm/kg/day  - Avoid smoking, alcohol, NSAIDS.  - Abstain from alcohol and all illicit drugs  -Avoid use of herbal and dietary supplements due to potential hepatotoxicity  -Limit use of acetaminophen to <2 grams per day  -Avoid use of nonsteroidal antiinflammatory drugs (NSAIDs)    -Avoid eating any unpasteurized dairy products; avoid eating any raw or undercooked eggs, fish, poultry, or meat; and avoid eating raw/steamed oysters or other shellfish to avoid risk of Vibrio infection.    #Vaccinations:  Please f/u in clinic for being uptodate with HAV/HBV/Influenza/Pneumococcal vaccines.    #LT evaluation  Social status: Lives with mother and brother. Brother volunteered to be a donor. 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

## 2023-11-21 NOTE — PROGRESS NOTE ADULT - SUBJECTIVE AND OBJECTIVE BOX
24H events:    Patient is a 28y old Male who presents with a chief complaint of cholestasis, abdominal ascites, hyponatremia (21 Nov 2023 11:36)    Primary diagnosis of Jaundice    Today is hospital day 5d. This morning patient was seen and examined at bedside, resting comfortably in bed.    No acute or major events overnight.    Code Status: full code    Family communication:  Contact date:  Name of person contacted:  Relationship to patient:  Communication details:  What matters most:    PAST MEDICAL & SURGICAL HISTORY  No significant past surgical history      SOCIAL HISTORY:  Social History: alcohol use disorder      ALLERGIES:  No Known Allergies    MEDICATIONS:  STANDING MEDICATIONS  chlorhexidine 2% Cloths 1 Application(s) Topical daily  folic acid 1 milliGRAM(s) Oral daily  hepatitis B Vaccine - Adult (ENGERIX-B) 20 MICROGram(s) IntraMuscular once  influenza   Vaccine 0.5 milliLiter(s) IntraMuscular once  lactulose Syrup 20 Gram(s) Oral three times a day  midodrine. 5 milliGRAM(s) Oral three times a day  octreotide  Injectable 100 MICROGram(s) SubCutaneous three times a day  pantoprazole    Tablet 40 milliGRAM(s) Oral before breakfast  thiamine 100 milliGRAM(s) Oral daily    PRN MEDICATIONS  LORazepam   Injectable 2 milliGRAM(s) IntraMuscular every 2 hours PRN  traMADol 25 milliGRAM(s) Oral every 8 hours PRN    VITALS:   T(F): 97.9  HR: 100  BP: 114/57  RR: 20  SpO2: 100%    PHYSICAL EXAM:  GENERAL:   ( + ) NAD, lying in bed comfortably     (  ) obtunded     (  ) lethargic     (  ) somnolent    HEAD:   ( + ) Atraumatic     (  ) hematoma     (  ) laceration (specify location:       )     NECK:  (  ) Supple     (  ) neck stiffness     (  ) nuchal rigidity     (  )  no JVD     (  ) JVD present ( -- cm)    HEART:  Rate -->     (  ) normal rate     (  ) bradycardic     (  ) tachycardic  Rhythm -->     (  ) regular     (  ) regularly irregular     (  ) irregularly irregular  Murmurs -->     (  ) normal s1s2     (  ) systolic murmur     (  ) diastolic murmur     (  ) continuous murmur      (  ) S3 present     (  ) S4 present    LUNGS:   ( + )Unlabored respirations     (  ) tachypnea  ( + ) B/L air entry     (  ) decreased breath sounds in:  (location     )    (  ) no adventitious sound     (  ) crackles     (  ) wheezing      (  ) rhonchi      (specify location:       )  (  ) chest wall tenderness (specify location:       )    ABDOMEN:   ( + ) Soft     (  ) tense   |   (  ) nondistended     (+  ) distended   |   ( + ) +BS     (  ) hypoactive bowel sounds     (  ) hyperactive bowel sounds  ( + ) nontender     (  ) RUQ tenderness     (  ) RLQ tenderness     (  ) LLQ tenderness     (  ) epigastric tenderness     (  ) diffuse tenderness  (  ) Splenomegaly      ( + ) Hepatomegaly      ( + ) Jaundice     (  ) ecchymosis     EXTREMITIES: 2+ peripheral pulses bilaterally. No clubbing, cyanosis, or edema  (  ) Normal     (  ) Rash     (  ) ecchymosis     (  ) varicose veins      (  ) pitting edema     (  ) non-pitting edema   (  ) ulceration     (  ) gangrene:     (location:     )    NERVOUS SYSTEM:    ( + ) A&Ox3     (  ) confused     (  ) lethargic  CN II-XII:     ( + ) Intact     (  ) deficits found     (Specify:     )   Upper extremities:     (  ) no sensorimotor deficits     (  ) weakness     (  ) loss of proprioception/vibration     (  ) loss of touch/temperature (specify:    )  Lower extremities:     (  ) no sensorimotor deficits     (  ) weakness     (  ) loss of proprioception/vibration     (  ) loss of touch/temperature (specify:    )    SKIN:   ( + ) No rashes or lesions     (  ) maculopapular rash     (  ) pustules     (  ) vesicles     (  ) ulcer     (  ) ecchymosis     (specify location:     )    AMPAC score:    ( - ) Indwelling Chung Catheter:   Date insterted:    Reason (  ) Critical illness     (  ) urinary retention    (  ) Accurate Ins/Outs Monitoring     (  ) CMO patient    ( - ) Central Line:   Date inserted:  Location: (  ) Right IJ     (  ) Left IJ     (  ) Right Fem     (  ) Left Fem    (  ) SPC        (  ) pigtail       (  ) PEG tube       (  ) colostomy       (  ) jejunostomy  (  ) U-Dall    LABS:                        9.7    18.09 )-----------( 214      ( 21 Nov 2023 05:11 )             27.9     11-21    127<L>  |  94<L>  |  39<H>  ----------------------------<  123<H>  3.8   |  15<L>  |  1.4    Ca    7.9<L>      21 Nov 2023 05:11  Mg     2.3     11-21    TPro  5.0<L>  /  Alb  3.5  /  TBili  32.3<HH>  /  DBili  x   /  AST  121<H>  /  ALT  14  /  AlkPhos  143<H>  11-21    PT/INR - ( 20 Nov 2023 21:35 )   PT: 25.50 sec;   INR: 2.22 ratio         PTT - ( 20 Nov 2023 21:35 )  PTT:41.6 sec  Urinalysis Basic - ( 21 Nov 2023 05:11 )    Color: x / Appearance: x / SG: x / pH: x  Gluc: 123 mg/dL / Ketone: x  / Bili: x / Urobili: x   Blood: x / Protein: x / Nitrite: x   Leuk Esterase: x / RBC: x / WBC x   Sq Epi: x / Non Sq Epi: x / Bacteria: x            Culture - Blood (collected 19 Nov 2023 11:56)  Source: .Blood None  Preliminary Report (20 Nov 2023 22:02):    No growth at 24 hours          RADIOLOGY:    < from: US Abdomen Upper Quadrant Right (11.15.23 @ 21:59) >  IMPRESSION:  1.  Enlarged fatty infiltrated liver (hepatomegaly with diffuse   steatosis).  2.  Sludge within the gallbladder lumen which has a thickened edematous   wall. No echogenic stones. Negative sonographic Carter's sign. Thickened   edematous gallbladder wall is a nonspecific finding possibly related to   volume status in this patient.  3.  Ascites is noted, better evaluated on CT.    < end of copied text >

## 2023-11-21 NOTE — PROGRESS NOTE ADULT - ASSESSMENT
IMPRESSION:    Portal Hypertension/Hepatomegaly/Right Paracolic Varices/Alcoholic Hepatitis/Steatosis/Liver failure  Hyponatremia in setting of Liver Disease   Hepatorenal Syndrome  Possible Diffuse Colitis  Transaminitis, downtrending   Abdominal/Pelvic Ascites SP Paracentesis 3.2L      PLAN:    CNS: mental status at baseline, thiamine, folate    HEENT: Oral care    PULMONARY:  HOB @ 45 degrees.  Aspiration precautions. on room air, incentive spirometry    CARDIOVASCULAR: avoid overload    GI: GI prophylaxis. Hepatology follow up,  SP albumin challenge, diuretics on hold secondary to YANDY, patient declined transfer to liver transplant center     RENAL:  Follow up lytes.  Correct as needed, u na    INFECTIOUS DISEASE: procal, repeat cx    HEMATOLOGICAL:  DVT prophylaxis. maintain active type and screen, transfuse if Hgb<7.0    ENDOCRINE:  Follow up FS.  Insulin protocol if needed.    MUSCULOSKELETAL: activity as tolerated     SDU   poor prognosis

## 2023-11-21 NOTE — PROGRESS NOTE ADULT - ASSESSMENT
29 yo male previously healthy presented today for worsening jaundice and abdominal distention that started 2 weeks ago. Since his jaundice started, the patient began drinking a lot of fluids minimum 4L / day, his abdominal distention kept getting worse and his urine became darker, with oliguria. He reports SOB attributed to his abdomen pushing up on his diaphragm.     #Acute decompensated liver failure likely ETOH use  #ascites s/p paracentesis  Elevated INR  used to be a daily 4 beer/day drinker with episodes of binge drinking, stopped 1 month ago  - CT abd pelvis:   a) Diffuse large amount of abdominal and pelvic ascites. Hepatosplenomegaly with right paracolic varices, findings suspicious for portal hypertension. Diffuse colonic wall thickening, possibly related to a diffuse colitis versus this severely edematous state of the patient. Nonspecific dilated loops of small bowel without definite evidence of obstruction.   b) Partial collapse of the right lower lobe, left lower lobe and right middle lobe.  - RUQ US :   a)  Enlarged fatty infiltrated liver (hepatomegaly with diffuse steatosis).  b) Sludge within the gallbladder lumen which has a thickened edematous wall. No echogenic stones. Negative sonographic Carter's sign. Thickened edematous gallbladder wall is a nonspecific finding possibly related to volume status in this patient.   c)  Ascites is noted.  - s/p paracentesis 11/16 with 3.2 L removed, no evidence of SBP. Now off antibiotics. frequent abdominal exams   - s/p Albumin, Vit K. Monitor Mental status  - avoid hepatotoxic drugs (NSAIDs)  - started on albumin 20 g   - monitor LFts/INR q12H  - hepatology following, patient continues to refuse LT center transfer  **Please notify hepatology with any change on mental status or worsening INR for immediate LT center transfer  - blood cx NGTD (11/16), f/u peritoneal fluid cultures  - c/w thiamine + folate  - repeat BCx (11/19): NGTD  - procal 1.77  - procedure team consulted (11/20) for therapeutic paracentesis> pocket not large enough and was deferred for now   - c/w Midodrine 5mg TID, octreotide 100mg TID subq (agreeable)// hepatology offered terlipressin but pt would like to hold off given side effects    #Severe Subacute hyponatremia - likely hypervolemic 2/2 increased free water intake  - Na worsening 130 > 127  - f/u nephro    #YANDY  patient asymptomatic   - BMP monitoring Q8H  - per GI, can dc fluid restriction but avoid IVF   - Nephrology following, c.w midodrine 5 q8H  - Hepatology: can use eplerenone and furosemide when creatinine improves (pt doesn't want spironolactone), avoid prednisone   - f/u antibodies    #Normocytic anemia with no signs of bleed, monitor    # HCC screening:  - Please f/u as outpatient for US abdomen and AFP every 6 months.  - outpatient EGD for variceal screening  - needs HepB vaccine, hepA/Influenza/Pneumococcal vaccines.  Overall prognosis is guarded   **Please notify hepatology with any change on mental status or worsening INR for immediate LT center transfer    Handoff: f/u 8am cortisol. INR/LFT q12h, f/u paracentesis cultures, f/u antibodies       27 yo male previously healthy presented today for worsening jaundice and abdominal distention that started 2 weeks ago. Since his jaundice started, the patient began drinking a lot of fluids minimum 4L / day, his abdominal distention kept getting worse and his urine became darker, with oliguria. He reports SOB attributed to his abdomen pushing up on his diaphragm.     #Acute decompensated liver failure likely ETOH use  #ascites s/p paracentesis  Elevated INR  used to be a daily 4 beer/day drinker with episodes of binge drinking, stopped 1 month ago  - CT abd pelvis:   a) Diffuse large amount of abdominal and pelvic ascites. Hepatosplenomegaly with right paracolic varices, findings suspicious for portal hypertension. Diffuse colonic wall thickening, possibly related to a diffuse colitis versus this severely edematous state of the patient. Nonspecific dilated loops of small bowel without definite evidence of obstruction.   b) Partial collapse of the right lower lobe, left lower lobe and right middle lobe.  - RUQ US :   a)  Enlarged fatty infiltrated liver (hepatomegaly with diffuse steatosis).  b) Sludge within the gallbladder lumen which has a thickened edematous wall. No echogenic stones. Negative sonographic Carter's sign. Thickened edematous gallbladder wall is a nonspecific finding possibly related to volume status in this patient.   c)  Ascites is noted.  - s/p paracentesis 11/16 with 3.2 L removed, no evidence of SBP. Now off antibiotics. frequent abdominal exams   - s/p Albumin, Vit K. Monitor Mental status  - avoid hepatotoxic drugs (NSAIDs)  - started on albumin 20 g   - monitor LFts/INR q12H  - hepatology following, patient continues to refuse LT center transfer  **Please notify hepatology with any change on mental status or worsening INR for immediate LT center transfer  - blood cx NGTD (11/16), f/u peritoneal fluid cultures  - c/w thiamine + folate  - repeat BCx (11/19): NGTD  - procal 1.77  - procedure team consulted (11/20) for diagnostic and therapeutic paracentesis> pocket not large enough and was deferred for now   - c/w Midodrine 5mg TID, octreotide 100mg TID subq (agreeable)// hepatology offered terlipressin but pt would like to hold off given side effects    #Severe Subacute hyponatremia - likely hypervolemic 2/2 increased free water intake  - Na worsening 130 > 127  - nephro: ADH presence c/w fluid restriction  - hepatology: stop fluid restriction  - f/u nephro    #YANDY  patient asymptomatic   - BMP monitoring Q8H  - per GI, can dc fluid restriction but avoid IVF   - Nephrology following, c.w midodrine 5 q8H  - Hepatology: can use eplerenone and furosemide when creatinine improves (pt doesn't want spironolactone), avoid prednisone   - f/u antibodies    #Normocytic anemia with no signs of bleed, monitor    # HCC screening:  - Please f/u as outpatient for US abdomen and AFP every 6 months.  - outpatient EGD for variceal screening  - needs HepB vaccine, hepA/Influenza/Pneumococcal vaccines.  Overall prognosis is guarded   **Please notify hepatology with any change on mental status or worsening INR for immediate LT center transfer    Handoff: f/u 8am cortisol. INR/LFT q12h, f/u paracentesis cultures, f/u antibodies, f/u PT, f/u nephro (Na)

## 2023-11-21 NOTE — PROGRESS NOTE ADULT - ASSESSMENT
29 yo male previously healthy presented today for worsening jaundice and abdominal distention that started 2 weeks ago. Since his jaundice started, the patient began drinking a lot of fluids minimum 4L / day, his abdominal distention kept getting worse and his urine became darker, with oliguria. He reports SOB attributed to his abdomen pushing up on his diaphragm.      Acute liver failure unclear etiology but suspected ETOH use  Elevated INR  used to be a daily 4 beer/day drinker with episodes of binge drinking, stopped 1 month ago  - CT abd pelvis:   a) Diffuse large amount of abdominal and pelvic ascites. Hepatosplenomegaly with right paracolic varices, findings suspicious for portal hypertension. Diffuse colonic wall thickening, possibly related to a diffuse colitis versus this severely edematous state of the patient. Nonspecific dilated loops of small bowel without definite evidence of obstruction.   b) Partial collapse of the right lower lobe, left lower lobe and right middle lobe.  - RUQ US :   a)  Enlarged fatty infiltrated liver (hepatomegaly with diffuse steatosis).  b) Sludge within the gallbladder lumen which has a thickened edematous wall. No echogenic stones. Negative sonographic Carter's sign. Thickened edematous gallbladder wall is a nonspecific finding possibly related to volume status in this patient.   c)  Ascites is noted.  - s/p paracentesis 11/16 with 3.2 L removed, no evidence of SBP. Now off antibiotics. frequent abdominal exams. FU repeat blood cx and procal   - s/p Albumin, Vit K. Monitor Mental status  - started on albumin 20 g and octreotide, patient now agreeable to octreotide. Discussed extensively with him and father at bedside   - monitor LFts/INR q8H  - hepatology following, patient continues to refuse LT center transfer    Severe Subacute hyponatremia - likely hypervolemic 2/2 increased free water intake slowly correcting   YANDY  patient asymptomatic   - BMP monitoring Q8H  - per GI, can dc fluid restriction but avoid IVF   - Nephrology following, c.w midodrine 5 q8H    Normocytic anemia with no signs of bleed, monitor    Overall prognosis is guarded, if worsening mental status or coagulopathy, will need urgent transfer to tplant center, hepatology on board     Patient seen at bedside, total time spent to evaluate and treat the patient's acute illness and chronic medical conditions as well as time spent reviewing labs, radiology, discussing medical plan with covering medical team was more than 50 minutes with >50% of time spent face to face with patient, discussing with patient and father at bedside, as well as coordination of care

## 2023-11-21 NOTE — PROGRESS NOTE ADULT - ATTENDING COMMENTS
28 y o M of [place] origin with AUD admitted with jaundice and abdominal distension.   Has been having abdominal distension for few weeks and jaundice over several weeks. Reports that jaundice improving.   Stopped alcohol more than 4 weeks ago. Was drinking 4 beers per day.   Lives with mother and brother. Brother volunteered to be a donor. 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 Works as [employer] .     Feels ok. No complaints. No confusion. Abdomen getting more distended. Still declining transfer.   Alert oriented x 3  Icteric  Abdomen distended soft hepatomegaly+   Ext no edema  No asterixis   HBsAg HBcAb total HCV RNA HIV EBV Ig M negative  CMV PCR not detected. AMA ASMA negative     Severe alcoholic hepatitis with coagulopathy ascites MELD > 30 MDF > 32  Hyponatremia  YANDY likely YANDY - HRS  Blood group - B pos    Now refusing terlipressin and OK with octreotide.  Start octreotide and continue midodrine.   Continue IV Human albumin 25 % 20 g  Hold prednisone with YANDY.   No need for fluid restriction.   With YANDY hold diuretics. Does not want spironolactone, so can use eplerenone and furosemide when creatinine improves.   Please check 8 am S cortisol and Pre transplant lab work - syphilis EBV PCR strongyloides ab toxoplasma ab MMR antibodies  Echo - rule out CMP  Monitor MELD labs  Advised transfer to LT center for evaluation and listing but declined. Advised if signs of worsening will transfer to [facility].  If encephalopathy or worsening coagulopathy will need urgent transfer.    No dental issues. Can address dental problems post LT if needed.  Hepatitis B vaccination.

## 2023-11-21 NOTE — PROGRESS NOTE ADULT - SUBJECTIVE AND OBJECTIVE BOX
Over Night Events: events noted, afebrile, hepato reviewed    PHYSICAL EXAM    ICU Vital Signs Last 24 Hrs  T(C): 36.1 (21 Nov 2023 04:00), Max: 36.3 (21 Nov 2023 00:00)  T(F): 96.9 (21 Nov 2023 04:00), Max: 97.4 (21 Nov 2023 00:00)  HR: 105 (21 Nov 2023 04:00) (99 - 105)  BP: 104/63 (21 Nov 2023 04:00) (103/59 - 117/67)  BP(mean): 78 (21 Nov 2023 04:00) (76 - 84)  RR: 20 (21 Nov 2023 04:00) (20 - 22)  SpO2: 100% (21 Nov 2023 04:00) (98% - 100%)    O2 Parameters below as of 20 Nov 2023 20:00  Patient On (Oxygen Delivery Method): nasal cannula            General: ill looking, icteric sclera   Lungs: dec bs both bases  Cardiovascular: Regular   Abdomen: Soft, Positive BS  Extremities: No clubbing   Skin: jaundice  Neurological: Non focal       11-19-23 @ 07:01  -  11-20-23 @ 07:00  --------------------------------------------------------  IN:    Albumin 25%  -  50 mL: 80 mL  Total IN: 80 mL    OUT:  Total OUT: 0 mL    Total NET: 80 mL          LABS:                          9.7    18.09 )-----------( 214      ( 21 Nov 2023 05:11 )             27.9                                               11-20    128<L>  |  97<L>  |  33<H>  ----------------------------<  122<H>  3.4<L>   |  18  |  1.4    Ca    8.0<L>      20 Nov 2023 04:54  Mg     2.5     11-20    TPro  4.9<L>  /  Alb  3.2<L>  /  TBili  32.8<HH>  /  DBili  20.6<H>  /  AST  112<H>  /  ALT  12  /  AlkPhos  134<H>  11-20      PT/INR - ( 20 Nov 2023 21:35 )   PT: 25.50 sec;   INR: 2.22 ratio         PTT - ( 20 Nov 2023 21:35 )  PTT:41.6 sec                                       Urinalysis Basic - ( 20 Nov 2023 04:54 )    Color: x / Appearance: x / SG: x / pH: x  Gluc: 122 mg/dL / Ketone: x  / Bili: x / Urobili: x   Blood: x / Protein: x / Nitrite: x   Leuk Esterase: x / RBC: x / WBC x   Sq Epi: x / Non Sq Epi: x / Bacteria: x                                                  LIVER FUNCTIONS - ( 20 Nov 2023 21:35 )  Alb: 3.2 g/dL / Pro: 4.9 g/dL / ALK PHOS: 134 U/L / ALT: 12 U/L / AST: 112 U/L / GGT: x                                                  Culture - Blood (collected 19 Nov 2023 11:56)  Source: .Blood None  Preliminary Report (20 Nov 2023 22:02):    No growth at 24 hours                                                                                           MEDICATIONS  (STANDING):  chlorhexidine 2% Cloths 1 Application(s) Topical daily  folic acid 1 milliGRAM(s) Oral daily  hepatitis B Vaccine - Adult (ENGERIX-B) 20 MICROGram(s) IntraMuscular once  influenza   Vaccine 0.5 milliLiter(s) IntraMuscular once  lactulose Syrup 20 Gram(s) Oral three times a day  midodrine. 5 milliGRAM(s) Oral three times a day  octreotide  Injectable 100 MICROGram(s) SubCutaneous three times a day  pantoprazole    Tablet 40 milliGRAM(s) Oral before breakfast  thiamine 100 milliGRAM(s) Oral daily    MEDICATIONS  (PRN):  LORazepam   Injectable 2 milliGRAM(s) IntraMuscular every 2 hours PRN Symptom-triggered: 2 point increase in CIWA -Ar score and a total score of 7 or LESS  traMADol 25 milliGRAM(s) Oral every 8 hours PRN Severe Pain (7 - 10)

## 2023-11-21 NOTE — PROGRESS NOTE ADULT - SUBJECTIVE AND OBJECTIVE BOX
Gastroenterology progress note:     Patient is a 28y old  Male who presents with a chief complaint of cholestasis, abdominal ascites, hyponatremia (21 Nov 2023 06:50)       Admitted on: 11-16-23    We are following the patient for: severe alcoholic hepatitis, decompensated liver cirrhosis       Interval History: pt thinking about terlipressin, pending paracentesis today      PAST MEDICAL & SURGICAL HISTORY:  No significant past surgical history          MEDICATIONS  (STANDING):  chlorhexidine 2% Cloths 1 Application(s) Topical daily  folic acid 1 milliGRAM(s) Oral daily  hepatitis B Vaccine - Adult (ENGERIX-B) 20 MICROGram(s) IntraMuscular once  influenza   Vaccine 0.5 milliLiter(s) IntraMuscular once  lactulose Syrup 20 Gram(s) Oral three times a day  midodrine. 5 milliGRAM(s) Oral three times a day  octreotide  Injectable 100 MICROGram(s) SubCutaneous three times a day  pantoprazole    Tablet 40 milliGRAM(s) Oral before breakfast  thiamine 100 milliGRAM(s) Oral daily    MEDICATIONS  (PRN):  LORazepam   Injectable 2 milliGRAM(s) IntraMuscular every 2 hours PRN Symptom-triggered: 2 point increase in CIWA -Ar score and a total score of 7 or LESS  traMADol 25 milliGRAM(s) Oral every 8 hours PRN Severe Pain (7 - 10)      Allergies  No Known Allergies      Review of Systems:   Cardiovascular:  No Chest Pain, No Palpitations  Respiratory:  No Cough, No Dyspnea  Gastrointestinal:  As described in HPI  Skin:  No Skin Lesions, No Jaundice  Neuro:  No Syncope, No Dizziness    Physical Examination:  T(C): 36.6 (11-21-23 @ 07:23), Max: 36.6 (11-21-23 @ 07:23)  HR: 100 (11-21-23 @ 07:23) (99 - 105)  BP: 114/57 (11-21-23 @ 07:23) (103/59 - 117/67)  RR: 20 (11-21-23 @ 07:23) (20 - 22)  SpO2: 100% (11-21-23 @ 07:23) (98% - 100%)        GENERAL: AAOx3, no acute distress.  HEAD:  Atraumatic, Normocephalic  EYES: conjunctiva and sclera clear  NECK: Supple, no JVD or thyromegaly  CHEST/LUNG: Clear to auscultation bilaterally; No wheeze, rhonchi, or rales  HEART: Regular rate and rhythm; normal S1, S2, No murmurs.  ABDOMEN: Soft, nontender, nondistended; Bowel sounds present  NEUROLOGY: No asterixis or tremor.   SKIN:  jaundice +     Data:                        9.7    18.09 )-----------( 214      ( 21 Nov 2023 05:11 )             27.9     Hgb trend:  9.7  11-21-23 @ 05:11  8.9  11-20-23 @ 04:54  8.6  11-19-23 @ 05:10      11-18-23 @ 07:01  -  11-19-23 @ 07:00  --------------------------------------------------------  IN: 300 mL    11-19-23 @ 07:01  -  11-20-23 @ 07:00  --------------------------------------------------------  IN: 80 mL      11-21    127<L>  |  94<L>  |  39<H>  ----------------------------<  123<H>  3.8   |  15<L>  |  1.4    Ca    7.9<L>      21 Nov 2023 05:11  Mg     2.3     11-21    TPro  5.0<L>  /  Alb  3.5  /  TBili  32.3<HH>  /  DBili  x   /  AST  121<H>  /  ALT  14  /  AlkPhos  143<H>  11-21    Liver panel trend:  TBili 32.3   /      /   ALT 14   /   AlkP 143   /   Tptn 5.0   /   Alb 3.5    /   DBili --      11-21  TBili 32.8   /      /   ALT 12   /   AlkP 134   /   Tptn 4.9   /   Alb 3.2    /   DBili 20.6      11-20  TBili 34.0   /      /   ALT 12   /   AlkP 106   /   Tptn 5.2   /   Alb 3.7    /   DBili --      11-20  TBili 34.5   /      /   ALT 13   /   AlkP 111   /   Tptn 5.3   /   Alb 3.3    /   DBili 25.4      11-19  TBili 32.6   /      /   ALT 11   /   AlkP 110   /   Tptn 5.0   /   Alb 3.5    /   DBili --      11-19  TBili 33.0   /      /   ALT 10   /   AlkP 107   /   Tptn 5.2   /   Alb 3.7    /   DBili 25.6      11-18  TBili 31.5   /      /   ALT 12   /   AlkP 123   /   Tptn 4.9   /   Alb 3.1    /   DBili --      11-18  TBili 30.5   /      /   ALT 14   /   AlkP 143   /   Tptn 5.0   /   Alb 2.7    /   DBili >17.2      11-17  TBili 34.3   /   AST --   /   ALT --   /   AlkP --   /   Tptn --   /   Alb --    /   DBili --      11-17  TBili 30.6   /      /   ALT 13   /   AlkP 150   /   Tptn 5.0   /   Alb 2.7    /   DBili 20.1      11-17  TBili 29.9   /      /   ALT 14   /   AlkP 147   /   Tptn 5.0   /   Alb 2.5    /   DBili 20.3      11-17  TBili 29.2   /      /   ALT 14   /   AlkP 165   /   Tptn 5.1   /   Alb 2.4    /   DBili >17.2      11-16  TBili 29.6   /      /   ALT 15   /   AlkP 162   /   Tptn 5.3   /   Alb 2.5    /   DBili >17.2      11-16  TBili 31.1   /      /   ALT 15   /   AlkP 175   /   Tptn 5.5   /   Alb 2.7    /   DBili >17.2      11-15      PT/INR - ( 20 Nov 2023 21:35 )   PT: 25.50 sec;   INR: 2.22 ratio         PTT - ( 20 Nov 2023 21:35 )  PTT:41.6 sec    Culture - Blood (collected 19 Nov 2023 11:56)  Source: .Blood None  Preliminary Report (20 Nov 2023 22:02):    No growth at 24 hours

## 2023-11-22 ENCOUNTER — TRANSCRIPTION ENCOUNTER (OUTPATIENT)
Age: 28
End: 2023-11-22

## 2023-11-22 VITALS
DIASTOLIC BLOOD PRESSURE: 77 MMHG | TEMPERATURE: 98 F | HEART RATE: 100 BPM | RESPIRATION RATE: 18 BRPM | OXYGEN SATURATION: 98 % | SYSTOLIC BLOOD PRESSURE: 123 MMHG

## 2023-11-22 LAB
ALBUMIN SERPL ELPH-MCNC: 3.5 G/DL — SIGNIFICANT CHANGE UP (ref 3.5–5.2)
ALBUMIN SERPL ELPH-MCNC: 3.5 G/DL — SIGNIFICANT CHANGE UP (ref 3.5–5.2)
ALBUMIN SERPL ELPH-MCNC: 4 G/DL — SIGNIFICANT CHANGE UP (ref 3.5–5.2)
ALBUMIN SERPL ELPH-MCNC: 4 G/DL — SIGNIFICANT CHANGE UP (ref 3.5–5.2)
ALP SERPL-CCNC: 140 U/L — HIGH (ref 30–115)
ALP SERPL-CCNC: 140 U/L — HIGH (ref 30–115)
ALP SERPL-CCNC: 157 U/L — HIGH (ref 30–115)
ALP SERPL-CCNC: 157 U/L — HIGH (ref 30–115)
ALT FLD-CCNC: 15 U/L — SIGNIFICANT CHANGE UP (ref 0–41)
ALT FLD-CCNC: 15 U/L — SIGNIFICANT CHANGE UP (ref 0–41)
ALT FLD-CCNC: 16 U/L — SIGNIFICANT CHANGE UP (ref 0–41)
ALT FLD-CCNC: 16 U/L — SIGNIFICANT CHANGE UP (ref 0–41)
ANION GAP SERPL CALC-SCNC: 18 MMOL/L — HIGH (ref 7–14)
ANION GAP SERPL CALC-SCNC: 18 MMOL/L — HIGH (ref 7–14)
ANION GAP SERPL CALC-SCNC: 21 MMOL/L — HIGH (ref 7–14)
ANION GAP SERPL CALC-SCNC: 21 MMOL/L — HIGH (ref 7–14)
APTT BLD: 41.7 SEC — HIGH (ref 27–39.2)
APTT BLD: 41.7 SEC — HIGH (ref 27–39.2)
AST SERPL-CCNC: 126 U/L — HIGH (ref 0–41)
AST SERPL-CCNC: 126 U/L — HIGH (ref 0–41)
AST SERPL-CCNC: 130 U/L — HIGH (ref 0–41)
AST SERPL-CCNC: 130 U/L — HIGH (ref 0–41)
BILIRUB SERPL-MCNC: 31.8 MG/DL — CRITICAL HIGH (ref 0.2–1.2)
BILIRUB SERPL-MCNC: 31.8 MG/DL — CRITICAL HIGH (ref 0.2–1.2)
BILIRUB SERPL-MCNC: 32.1 MG/DL — CRITICAL HIGH (ref 0.2–1.2)
BILIRUB SERPL-MCNC: 32.1 MG/DL — CRITICAL HIGH (ref 0.2–1.2)
BUN SERPL-MCNC: 51 MG/DL — HIGH (ref 10–20)
BUN SERPL-MCNC: 51 MG/DL — HIGH (ref 10–20)
BUN SERPL-MCNC: 53 MG/DL — HIGH (ref 10–20)
BUN SERPL-MCNC: 53 MG/DL — HIGH (ref 10–20)
CALCIUM SERPL-MCNC: 8 MG/DL — LOW (ref 8.4–10.5)
CALCIUM SERPL-MCNC: 8 MG/DL — LOW (ref 8.4–10.5)
CALCIUM SERPL-MCNC: 8.2 MG/DL — LOW (ref 8.4–10.4)
CALCIUM SERPL-MCNC: 8.2 MG/DL — LOW (ref 8.4–10.4)
CHLORIDE SERPL-SCNC: 95 MMOL/L — LOW (ref 98–110)
CHLORIDE SERPL-SCNC: 95 MMOL/L — LOW (ref 98–110)
CHLORIDE SERPL-SCNC: 96 MMOL/L — LOW (ref 98–110)
CHLORIDE SERPL-SCNC: 96 MMOL/L — LOW (ref 98–110)
CMV IGM FLD-ACNC: <8 AU/ML — SIGNIFICANT CHANGE UP
CMV IGM FLD-ACNC: <8 AU/ML — SIGNIFICANT CHANGE UP
CMV IGM SERPL QL: NEGATIVE — SIGNIFICANT CHANGE UP
CMV IGM SERPL QL: NEGATIVE — SIGNIFICANT CHANGE UP
CO2 SERPL-SCNC: 13 MMOL/L — LOW (ref 17–32)
CO2 SERPL-SCNC: 13 MMOL/L — LOW (ref 17–32)
CO2 SERPL-SCNC: 14 MMOL/L — LOW (ref 17–32)
CO2 SERPL-SCNC: 14 MMOL/L — LOW (ref 17–32)
CREAT SERPL-MCNC: 1.8 MG/DL — HIGH (ref 0.7–1.5)
CREAT SERPL-MCNC: 1.8 MG/DL — HIGH (ref 0.7–1.5)
CREAT SERPL-MCNC: 2.3 MG/DL — HIGH (ref 0.7–1.5)
CREAT SERPL-MCNC: 2.3 MG/DL — HIGH (ref 0.7–1.5)
EBV EA AB SER IA-ACNC: 119 U/ML — HIGH
EBV EA AB SER IA-ACNC: 119 U/ML — HIGH
EBV EA AB TITR SER IF: POSITIVE
EBV EA AB TITR SER IF: POSITIVE
EBV EA IGG SER-ACNC: POSITIVE
EBV EA IGG SER-ACNC: POSITIVE
EBV NA IGG SER IA-ACNC: 105 U/ML — HIGH
EBV NA IGG SER IA-ACNC: 105 U/ML — HIGH
EBV PATRN SPEC IB-IMP: SIGNIFICANT CHANGE UP
EBV PATRN SPEC IB-IMP: SIGNIFICANT CHANGE UP
EBV VCA IGG AVIDITY SER QL IA: POSITIVE
EBV VCA IGG AVIDITY SER QL IA: POSITIVE
EBV VCA IGM SER IA-ACNC: 160 U/ML — HIGH
EBV VCA IGM SER IA-ACNC: 160 U/ML — HIGH
EBV VCA IGM SER IA-ACNC: 19.7 U/ML — SIGNIFICANT CHANGE UP
EBV VCA IGM SER IA-ACNC: 19.7 U/ML — SIGNIFICANT CHANGE UP
EBV VCA IGM TITR FLD: NEGATIVE — SIGNIFICANT CHANGE UP
EBV VCA IGM TITR FLD: NEGATIVE — SIGNIFICANT CHANGE UP
EGFR: 38 ML/MIN/1.73M2 — LOW
EGFR: 38 ML/MIN/1.73M2 — LOW
EGFR: 51 ML/MIN/1.73M2 — LOW
EGFR: 51 ML/MIN/1.73M2 — LOW
GLUCOSE SERPL-MCNC: 101 MG/DL — HIGH (ref 70–99)
GLUCOSE SERPL-MCNC: 101 MG/DL — HIGH (ref 70–99)
GLUCOSE SERPL-MCNC: 134 MG/DL — HIGH (ref 70–99)
GLUCOSE SERPL-MCNC: 134 MG/DL — HIGH (ref 70–99)
HCT VFR BLD CALC: 26.5 % — LOW (ref 42–52)
HCT VFR BLD CALC: 26.5 % — LOW (ref 42–52)
HGB BLD-MCNC: 9.3 G/DL — LOW (ref 14–18)
HGB BLD-MCNC: 9.3 G/DL — LOW (ref 14–18)
INR BLD: 2.09 RATIO — HIGH (ref 0.65–1.3)
INR BLD: 2.09 RATIO — HIGH (ref 0.65–1.3)
INR BLD: 2.24 RATIO — HIGH (ref 0.65–1.3)
INR BLD: 2.24 RATIO — HIGH (ref 0.65–1.3)
LACTATE SERPL-SCNC: 2.4 MMOL/L — HIGH (ref 0.7–2)
LACTATE SERPL-SCNC: 2.4 MMOL/L — HIGH (ref 0.7–2)
MAGNESIUM SERPL-MCNC: 2.5 MG/DL — HIGH (ref 1.8–2.4)
MAGNESIUM SERPL-MCNC: 2.5 MG/DL — HIGH (ref 1.8–2.4)
MCHC RBC-ENTMCNC: 35.1 G/DL — SIGNIFICANT CHANGE UP (ref 32–37)
MCHC RBC-ENTMCNC: 35.1 G/DL — SIGNIFICANT CHANGE UP (ref 32–37)
MCHC RBC-ENTMCNC: 35.4 PG — HIGH (ref 27–31)
MCHC RBC-ENTMCNC: 35.4 PG — HIGH (ref 27–31)
MCV RBC AUTO: 100.8 FL — HIGH (ref 80–94)
MCV RBC AUTO: 100.8 FL — HIGH (ref 80–94)
MEV IGG SER-ACNC: 83.6 AU/ML — SIGNIFICANT CHANGE UP
MEV IGG SER-ACNC: 83.6 AU/ML — SIGNIFICANT CHANGE UP
MEV IGG+IGM SER-IMP: POSITIVE — SIGNIFICANT CHANGE UP
MEV IGG+IGM SER-IMP: POSITIVE — SIGNIFICANT CHANGE UP
MUV AB SER-ACNC: POSITIVE — SIGNIFICANT CHANGE UP
MUV AB SER-ACNC: POSITIVE — SIGNIFICANT CHANGE UP
MUV IGG FLD-ACNC: 224 AU/ML — SIGNIFICANT CHANGE UP
MUV IGG FLD-ACNC: 224 AU/ML — SIGNIFICANT CHANGE UP
NRBC # BLD: 0 /100 WBCS — SIGNIFICANT CHANGE UP (ref 0–0)
NRBC # BLD: 0 /100 WBCS — SIGNIFICANT CHANGE UP (ref 0–0)
PLATELET # BLD AUTO: 217 K/UL — SIGNIFICANT CHANGE UP (ref 130–400)
PLATELET # BLD AUTO: 217 K/UL — SIGNIFICANT CHANGE UP (ref 130–400)
PMV BLD: 10.3 FL — SIGNIFICANT CHANGE UP (ref 7.4–10.4)
PMV BLD: 10.3 FL — SIGNIFICANT CHANGE UP (ref 7.4–10.4)
POTASSIUM SERPL-MCNC: 3.8 MMOL/L — SIGNIFICANT CHANGE UP (ref 3.5–5)
POTASSIUM SERPL-SCNC: 3.8 MMOL/L — SIGNIFICANT CHANGE UP (ref 3.5–5)
PROCALCITONIN SERPL-MCNC: 2.41 NG/ML — HIGH (ref 0.02–0.1)
PROCALCITONIN SERPL-MCNC: 2.41 NG/ML — HIGH (ref 0.02–0.1)
PROT SERPL-MCNC: 5.2 G/DL — LOW (ref 6–8)
PROT SERPL-MCNC: 5.2 G/DL — LOW (ref 6–8)
PROT SERPL-MCNC: 5.6 G/DL — LOW (ref 6–8)
PROT SERPL-MCNC: 5.6 G/DL — LOW (ref 6–8)
PROTHROM AB SERPL-ACNC: 24 SEC — HIGH (ref 9.95–12.87)
PROTHROM AB SERPL-ACNC: 24 SEC — HIGH (ref 9.95–12.87)
PROTHROM AB SERPL-ACNC: 25.8 SEC — HIGH (ref 9.95–12.87)
PROTHROM AB SERPL-ACNC: 25.8 SEC — HIGH (ref 9.95–12.87)
RBC # BLD: 2.63 M/UL — LOW (ref 4.7–6.1)
RBC # BLD: 2.63 M/UL — LOW (ref 4.7–6.1)
RBC # FLD: 18 % — HIGH (ref 11.5–14.5)
RBC # FLD: 18 % — HIGH (ref 11.5–14.5)
RUBV IGG SER-ACNC: 4.1 INDEX — SIGNIFICANT CHANGE UP
RUBV IGG SER-ACNC: 4.1 INDEX — SIGNIFICANT CHANGE UP
RUBV IGG SER-IMP: POSITIVE — SIGNIFICANT CHANGE UP
RUBV IGG SER-IMP: POSITIVE — SIGNIFICANT CHANGE UP
SCHISTOSOMA IGG SER-ACNC: <1 — SIGNIFICANT CHANGE UP
SCHISTOSOMA IGG SER-ACNC: <1 — SIGNIFICANT CHANGE UP
SODIUM SERPL-SCNC: 127 MMOL/L — LOW (ref 135–146)
SODIUM SERPL-SCNC: 127 MMOL/L — LOW (ref 135–146)
SODIUM SERPL-SCNC: 130 MMOL/L — LOW (ref 135–146)
SODIUM SERPL-SCNC: 130 MMOL/L — LOW (ref 135–146)
T GONDII IGG SER QL: <3 IU/ML — SIGNIFICANT CHANGE UP
T GONDII IGG SER QL: <3 IU/ML — SIGNIFICANT CHANGE UP
T GONDII IGG SER QL: NEGATIVE — SIGNIFICANT CHANGE UP
T GONDII IGG SER QL: NEGATIVE — SIGNIFICANT CHANGE UP
T GONDII IGM SER QL: <3 AU/ML — SIGNIFICANT CHANGE UP
T GONDII IGM SER QL: <3 AU/ML — SIGNIFICANT CHANGE UP
T GONDII IGM SER QL: NEGATIVE — SIGNIFICANT CHANGE UP
T GONDII IGM SER QL: NEGATIVE — SIGNIFICANT CHANGE UP
T PALLIDUM AB TITR SER: NEGATIVE — SIGNIFICANT CHANGE UP
T PALLIDUM AB TITR SER: NEGATIVE — SIGNIFICANT CHANGE UP
WBC # BLD: 20.2 K/UL — HIGH (ref 4.8–10.8)
WBC # BLD: 20.2 K/UL — HIGH (ref 4.8–10.8)
WBC # FLD AUTO: 20.2 K/UL — HIGH (ref 4.8–10.8)
WBC # FLD AUTO: 20.2 K/UL — HIGH (ref 4.8–10.8)

## 2023-11-22 PROCEDURE — 76770 US EXAM ABDO BACK WALL COMP: CPT | Mod: 26

## 2023-11-22 PROCEDURE — 74018 RADEX ABDOMEN 1 VIEW: CPT | Mod: 26

## 2023-11-22 PROCEDURE — 99233 SBSQ HOSP IP/OBS HIGH 50: CPT

## 2023-11-22 PROCEDURE — 99238 HOSP IP/OBS DSCHRG MGMT 30/<: CPT | Mod: GC

## 2023-11-22 RX ORDER — ALBUMIN HUMAN 25 %
300 VIAL (ML) INTRAVENOUS ONCE
Refills: 0 | Status: COMPLETED | OUTPATIENT
Start: 2023-11-22 | End: 2023-11-22

## 2023-11-22 RX ORDER — PANTOPRAZOLE SODIUM 20 MG/1
1 TABLET, DELAYED RELEASE ORAL
Qty: 0 | Refills: 0 | DISCHARGE
Start: 2023-11-22

## 2023-11-22 RX ORDER — MIDODRINE HYDROCHLORIDE 2.5 MG/1
1 TABLET ORAL
Qty: 0 | Refills: 0 | DISCHARGE
Start: 2023-11-22

## 2023-11-22 RX ORDER — FOLIC ACID 0.8 MG
1 TABLET ORAL
Qty: 0 | Refills: 0 | DISCHARGE
Start: 2023-11-22

## 2023-11-22 RX ORDER — THIAMINE MONONITRATE (VIT B1) 100 MG
1 TABLET ORAL
Qty: 0 | Refills: 0 | DISCHARGE
Start: 2023-11-22

## 2023-11-22 RX ORDER — OCTREOTIDE ACETATE 200 UG/ML
0.04 INJECTION, SOLUTION INTRAVENOUS; SUBCUTANEOUS
Qty: 0 | Refills: 0 | DISCHARGE
Start: 2023-11-22

## 2023-11-22 RX ORDER — ALBUMIN HUMAN 25 %
75 VIAL (ML) INTRAVENOUS ONCE
Refills: 0 | Status: COMPLETED | OUTPATIENT
Start: 2023-11-22 | End: 2023-11-22

## 2023-11-22 RX ORDER — LACTULOSE 10 G/15ML
30 SOLUTION ORAL
Qty: 0 | Refills: 0 | DISCHARGE
Start: 2023-11-22

## 2023-11-22 RX ADMIN — CHLORHEXIDINE GLUCONATE 1 APPLICATION(S): 213 SOLUTION TOPICAL at 13:12

## 2023-11-22 RX ADMIN — LACTULOSE 20 GRAM(S): 10 SOLUTION ORAL at 06:51

## 2023-11-22 RX ADMIN — PANTOPRAZOLE SODIUM 40 MILLIGRAM(S): 20 TABLET, DELAYED RELEASE ORAL at 06:51

## 2023-11-22 RX ADMIN — OCTREOTIDE ACETATE 100 MICROGRAM(S): 200 INJECTION, SOLUTION INTRAVENOUS; SUBCUTANEOUS at 13:13

## 2023-11-22 RX ADMIN — TRAMADOL HYDROCHLORIDE 25 MILLIGRAM(S): 50 TABLET ORAL at 04:17

## 2023-11-22 RX ADMIN — MIDODRINE HYDROCHLORIDE 5 MILLIGRAM(S): 2.5 TABLET ORAL at 13:12

## 2023-11-22 RX ADMIN — OCTREOTIDE ACETATE 100 MICROGRAM(S): 200 INJECTION, SOLUTION INTRAVENOUS; SUBCUTANEOUS at 06:51

## 2023-11-22 RX ADMIN — Medication 1 MILLIGRAM(S): at 13:12

## 2023-11-22 RX ADMIN — Medication 100 MILLIGRAM(S): at 13:12

## 2023-11-22 RX ADMIN — Medication 150 MILLILITER(S): at 13:17

## 2023-11-22 RX ADMIN — TRAMADOL HYDROCHLORIDE 25 MILLIGRAM(S): 50 TABLET ORAL at 03:50

## 2023-11-22 RX ADMIN — LACTULOSE 20 GRAM(S): 10 SOLUTION ORAL at 15:52

## 2023-11-22 RX ADMIN — Medication 150 GRAM(S): at 13:13

## 2023-11-22 NOTE — PROGRESS NOTE ADULT - SUBJECTIVE AND OBJECTIVE BOX
Gastroenterology progress note:     Patient is a 28y old  Male who presents with a chief complaint of cholestasis, abdominal ascites, hyponatremia (22 Nov 2023 16:32)       Admitted on: 11-16-23    We are following the patient for:       Interval History:    No acute events overnight.   - Diet -   - last BM -   - Abdominal pain -       PAST MEDICAL & SURGICAL HISTORY:  No significant past surgical history          MEDICATIONS  (STANDING):  chlorhexidine 2% Cloths 1 Application(s) Topical daily  folic acid 1 milliGRAM(s) Oral daily  hepatitis B Vaccine - Adult (ENGERIX-B) 20 MICROGram(s) IntraMuscular once  influenza   Vaccine 0.5 milliLiter(s) IntraMuscular once  lactulose Syrup 20 Gram(s) Oral three times a day  midodrine. 5 milliGRAM(s) Oral three times a day  octreotide  Injectable 100 MICROGram(s) SubCutaneous three times a day  pantoprazole    Tablet 40 milliGRAM(s) Oral before breakfast  thiamine 100 milliGRAM(s) Oral daily    MEDICATIONS  (PRN):  LORazepam   Injectable 2 milliGRAM(s) IntraMuscular every 2 hours PRN Symptom-triggered: 2 point increase in CIWA -Ar score and a total score of 7 or LESS  traMADol 25 milliGRAM(s) Oral every 8 hours PRN Severe Pain (7 - 10)      Allergies  No Known Allergies      Review of Systems:   Cardiovascular:  No Chest Pain, No Palpitations  Respiratory:  No Cough, No Dyspnea  Gastrointestinal:  As described in HPI  Skin:  No Skin Lesions, No Jaundice  Neuro:  No Syncope, No Dizziness    Physical Examination:  T(C): 36.8 (11-22-23 @ 15:51), Max: 36.8 (11-22-23 @ 15:51)  HR: 100 (11-22-23 @ 15:51) (100 - 102)  BP: 123/77 (11-22-23 @ 15:51) (95/50 - 123/77)  RR: 18 (11-22-23 @ 15:51) (18 - 20)  SpO2: 98% (11-22-23 @ 15:51) (95% - 98%)      11-21-23 @ 07:01  -  11-22-23 @ 07:00  --------------------------------------------------------  IN: 0 mL / OUT: 300 mL / NET: -300 mL        GENERAL: AAOx3, no acute distress.  HEAD:  Atraumatic, Normocephalic  EYES: conjunctiva and sclera clear  NECK: Supple, no JVD or thyromegaly  CHEST/LUNG: Clear to auscultation bilaterally; No wheeze, rhonchi, or rales  HEART: Regular rate and rhythm; normal S1, S2, No murmurs.  ABDOMEN: Soft, nontender, nondistended; Bowel sounds present  NEUROLOGY: No asterixis or tremor.   SKIN: Intact, no jaundice     Data:                        9.3    20.20 )-----------( 217      ( 22 Nov 2023 04:50 )             26.5     Hgb trend:  9.3  11-22-23 @ 04:50  9.7  11-21-23 @ 05:11  8.9  11-20-23 @ 04:54      11-19-23 @ 07:01  -  11-20-23 @ 07:00  --------------------------------------------------------  IN: 80 mL      11-22    127<L>  |  95<L>  |  51<H>  ----------------------------<  101<H>  3.8   |  14<L>  |  1.8<H>    Ca    8.2<L>      22 Nov 2023 04:50  Mg     2.5     11-22    TPro  5.2<L>  /  Alb  3.5  /  TBili  31.8<HH>  /  DBili  x   /  AST  130<H>  /  ALT  16  /  AlkPhos  157<H>  11-22    Liver panel trend:  TBili 31.8   /      /   ALT 16   /   AlkP 157   /   Tptn 5.2   /   Alb 3.5    /   DBili --      11-22  TBili 32.3   /      /   ALT 14   /   AlkP 143   /   Tptn 5.0   /   Alb 3.5    /   DBili --      11-21  TBili 32.8   /      /   ALT 12   /   AlkP 134   /   Tptn 4.9   /   Alb 3.2    /   DBili 20.6      11-20  TBili 34.0   /      /   ALT 12   /   AlkP 106   /   Tptn 5.2   /   Alb 3.7    /   DBili --      11-20  TBili 34.5   /      /   ALT 13   /   AlkP 111   /   Tptn 5.3   /   Alb 3.3    /   DBili 25.4      11-19  TBili 32.6   /      /   ALT 11   /   AlkP 110   /   Tptn 5.0   /   Alb 3.5    /   DBili --      11-19  TBili 33.0   /      /   ALT 10   /   AlkP 107   /   Tptn 5.2   /   Alb 3.7    /   DBili 25.6      11-18  TBili 31.5   /      /   ALT 12   /   AlkP 123   /   Tptn 4.9   /   Alb 3.1    /   DBili --      11-18  TBili 30.5   /      /   ALT 14   /   AlkP 143   /   Tptn 5.0   /   Alb 2.7    /   DBili >17.2      11-17  TBili 34.3   /   AST --   /   ALT --   /   AlkP --   /   Tptn --   /   Alb --    /   DBili --      11-17  TBili 30.6   /      /   ALT 13   /   AlkP 150   /   Tptn 5.0   /   Alb 2.7    /   DBili 20.1      11-17  TBili 29.9   /      /   ALT 14   /   AlkP 147   /   Tptn 5.0   /   Alb 2.5    /   DBili 20.3      11-17  TBili 29.2   /      /   ALT 14   /   AlkP 165   /   Tptn 5.1   /   Alb 2.4    /   DBili >17.2      11-16  TBili 29.6   /      /   ALT 15   /   AlkP 162   /   Tptn 5.3   /   Alb 2.5    /   DBili >17.2      11-16  TBili 31.1   /      /   ALT 15   /   AlkP 175   /   Tptn 5.5   /   Alb 2.7    /   DBili >17.2      11-15      PT/INR - ( 22 Nov 2023 04:50 )   PT: 25.80 sec;   INR: 2.24 ratio         PTT - ( 22 Nov 2023 04:50 )  PTT:41.7 sec       Radiology:    US Abdomen Complete:   ACC: [id number] EXAM:  US ABDOMEN COMPLETE   ORDERED BY: JOVANI CLINTON     PROCEDURE DATE:  11/21/2023          INTERPRETATION:  CLINICAL INFORMATION: Ascites    COMPARISON: Abdominal pelvic CT dated 11/15/2023    TECHNIQUE: A focused ultrasound of the abdomen to evaluate for ascites   was performed.    Findings/  impression:    Moderate to large amount of abdominal pelvic ascites    --- End of Report ---            MIRNA HOUGH MD; Attending Radiologist  This document has been electronically signed. Nov 22 2023  7:28AM (11-21-23 @ 16:48)     Gastroenterology progress note:     Patient is a 28y old  Male who presents with a chief complaint of cholestasis, abdominal ascites, hyponatremia (22 Nov 2023 16:32)       Admitted on: 11-16-23    We are following the patient for: decompensated liver cirrhosis, severe alcoholic hepatitis       Interval History: pt Creatine worsening, zero urine outpt    PAST MEDICAL & SURGICAL HISTORY:  No significant past surgical history          MEDICATIONS  (STANDING):  chlorhexidine 2% Cloths 1 Application(s) Topical daily  folic acid 1 milliGRAM(s) Oral daily  hepatitis B Vaccine - Adult (ENGERIX-B) 20 MICROGram(s) IntraMuscular once  influenza   Vaccine 0.5 milliLiter(s) IntraMuscular once  lactulose Syrup 20 Gram(s) Oral three times a day  midodrine. 5 milliGRAM(s) Oral three times a day  octreotide  Injectable 100 MICROGram(s) SubCutaneous three times a day  pantoprazole    Tablet 40 milliGRAM(s) Oral before breakfast  thiamine 100 milliGRAM(s) Oral daily    MEDICATIONS  (PRN):  LORazepam   Injectable 2 milliGRAM(s) IntraMuscular every 2 hours PRN Symptom-triggered: 2 point increase in CIWA -Ar score and a total score of 7 or LESS  traMADol 25 milliGRAM(s) Oral every 8 hours PRN Severe Pain (7 - 10)      Allergies  No Known Allergies      Review of Systems:   Cardiovascular:  No Chest Pain, No Palpitations  Respiratory:  No Cough, No Dyspnea  Gastrointestinal:  As described in HPI  Skin:  No Skin Lesions, No Jaundice  Neuro:  No Syncope, No Dizziness    Physical Examination:  T(C): 36.8 (11-22-23 @ 15:51), Max: 36.8 (11-22-23 @ 15:51)  HR: 100 (11-22-23 @ 15:51) (100 - 102)  BP: 123/77 (11-22-23 @ 15:51) (95/50 - 123/77)  RR: 18 (11-22-23 @ 15:51) (18 - 20)  SpO2: 98% (11-22-23 @ 15:51) (95% - 98%)      11-21-23 @ 07:01  -  11-22-23 @ 07:00  --------------------------------------------------------  IN: 0 mL / OUT: 300 mL / NET: -300 mL        GENERAL: AAOx3, no acute distress.  HEAD:  Atraumatic, Normocephalic  EYES: conjunctiva and sclera clear  NECK: Supple, no JVD or thyromegaly  CHEST/LUNG: Clear to auscultation bilaterally; No wheeze, rhonchi, or rales  HEART: Regular rate and rhythm; normal S1, S2, No murmurs.  ABDOMEN: Soft, nontender, moderately distended; Bowel sounds present  NEUROLOGY: No asterixis or tremor.   SKIN: Intact, jaundice +     Data:                        9.3    20.20 )-----------( 217      ( 22 Nov 2023 04:50 )             26.5     Hgb trend:  9.3  11-22-23 @ 04:50  9.7  11-21-23 @ 05:11  8.9  11-20-23 @ 04:54      11-19-23 @ 07:01  -  11-20-23 @ 07:00  --------------------------------------------------------  IN: 80 mL      11-22    127<L>  |  95<L>  |  51<H>  ----------------------------<  101<H>  3.8   |  14<L>  |  1.8<H>    Ca    8.2<L>      22 Nov 2023 04:50  Mg     2.5     11-22    TPro  5.2<L>  /  Alb  3.5  /  TBili  31.8<HH>  /  DBili  x   /  AST  130<H>  /  ALT  16  /  AlkPhos  157<H>  11-22    Liver panel trend:  TBili 31.8   /      /   ALT 16   /   AlkP 157   /   Tptn 5.2   /   Alb 3.5    /   DBili --      11-22  TBili 32.3   /      /   ALT 14   /   AlkP 143   /   Tptn 5.0   /   Alb 3.5    /   DBili --      11-21  TBili 32.8   /      /   ALT 12   /   AlkP 134   /   Tptn 4.9   /   Alb 3.2    /   DBili 20.6      11-20  TBili 34.0   /      /   ALT 12   /   AlkP 106   /   Tptn 5.2   /   Alb 3.7    /   DBili --      11-20  TBili 34.5   /      /   ALT 13   /   AlkP 111   /   Tptn 5.3   /   Alb 3.3    /   DBili 25.4      11-19  TBili 32.6   /      /   ALT 11   /   AlkP 110   /   Tptn 5.0   /   Alb 3.5    /   DBili --      11-19  TBili 33.0   /      /   ALT 10   /   AlkP 107   /   Tptn 5.2   /   Alb 3.7    /   DBili 25.6      11-18  TBili 31.5   /      /   ALT 12   /   AlkP 123   /   Tptn 4.9   /   Alb 3.1    /   DBili --      11-18  TBili 30.5   /      /   ALT 14   /   AlkP 143   /   Tptn 5.0   /   Alb 2.7    /   DBili >17.2      11-17  TBili 34.3   /   AST --   /   ALT --   /   AlkP --   /   Tptn --   /   Alb --    /   DBili --      11-17  TBili 30.6   /      /   ALT 13   /   AlkP 150   /   Tptn 5.0   /   Alb 2.7    /   DBili 20.1      11-17  TBili 29.9   /      /   ALT 14   /   AlkP 147   /   Tptn 5.0   /   Alb 2.5    /   DBili 20.3      11-17  TBili 29.2   /      /   ALT 14   /   AlkP 165   /   Tptn 5.1   /   Alb 2.4    /   DBili >17.2      11-16  TBili 29.6   /      /   ALT 15   /   AlkP 162   /   Tptn 5.3   /   Alb 2.5    /   DBili >17.2      11-16  TBili 31.1   /      /   ALT 15   /   AlkP 175   /   Tptn 5.5   /   Alb 2.7    /   DBili >17.2      11-15      PT/INR - ( 22 Nov 2023 04:50 )   PT: 25.80 sec;   INR: 2.24 ratio         PTT - ( 22 Nov 2023 04:50 )  PTT:41.7 sec       Radiology:    US Abdomen Complete:   ACC: [id number] EXAM:  US ABDOMEN COMPLETE   ORDERED BY: JOVANI CLINTON     PROCEDURE DATE:  11/21/2023          INTERPRETATION:  CLINICAL INFORMATION: Ascites    COMPARISON: Abdominal pelvic CT dated 11/15/2023    TECHNIQUE: A focused ultrasound of the abdomen to evaluate for ascites   was performed.    Findings/  impression:    Moderate to large amount of abdominal pelvic ascites    --- End of Report ---            MIRNA HOUGH MD; Attending Radiologist  This document has been electronically signed. Nov 22 2023  7:28AM (11-21-23 @ 16:48)

## 2023-11-22 NOTE — PROGRESS NOTE ADULT - PROVIDER SPECIALTY LIST ADULT
Critical Care
Critical Care
Hepatology
Hepatology
Internal Medicine
Internal Medicine
Nephrology
Critical Care
Hepatology
Internal Medicine
Nephrology
Critical Care
Internal Medicine
Nephrology
Hepatology
Hospitalist
Internal Medicine
Internal Medicine

## 2023-11-22 NOTE — PROGRESS NOTE ADULT - SUBJECTIVE AND OBJECTIVE BOX
SUBJECTIVE:    Patient is a 28y old Male who presents with a chief complaint of cholestasis, abdominal ascites, hyponatremia (22 Nov 2023 17:19)      HPI:  29 yo male previously healthy presented today for worsening jaundice and abdominal distended that started 2 weeks ago. Since his jaundice started, the patient began drinking a lot of fluids minimum 4L / day, his abdominal distention kept getting worse and his urine became darker, with oliguria. He reports SOB attributed to his abdomen pushing up on his diaphragm. Last BM yesterday, patient passing gas.  Patient denies any pain, fever, or any other signs of symptoms. No hx of mood disorder.   Social hx: former smoker stopped 2 years ago, has 3-4 PY.   alcohol: drinks every other day 4-5 beers, however last drink was 3 weeks ago.   No drugs., no allergies.     In the ED vitals sinus tachycardia   labs remarkable for WBC 16K, hb 10.7 (no BL), INR 5.69 , Na 119, AG 15, total bili 31.1 , aLP 175,   UA +VE  -ve alcohol level, acetaminophen and salicylate levels.    - CT abd pelv: Diffuse large amount of abdominal and pelvic ascites. Hepatosplenomegaly with right paracolic varices, findings suspicious for portal hypertension. Diffuse colonic wall thickening, possibly related to a diffuse colitis versus this severely edematous state of the patient. Nonspecific dilated loops of small bowel without definite evidence of obstruction.   Partial collapse of the right lower lobe, left lower lobe and right middle lobe.    - RUQ US : 1.  Enlarged fatty infiltrated liver (hepatomegaly with diffuse steatosis).  2.  Sludge within the gallbladder lumen which has a thickened edematous wall. No echogenic stones. Negative sonographic Carter's sign. Thickened edematous gallbladder wall is a nonspecific finding possibly related to volume status in this patient. 3.  Ascites is noted.    Patient admitted to SDU for severe hyponatremia, large abdominal ascites, and cholestatic liver injury.    (16 Nov 2023 02:40)      Currently admitted to medicine with the primary diagnosis of Liver failure    not having any abdominal pain, not confused, states he hasn't been urinating    Besides the pertinent positives and negatives described above, the ROS was within normal limits.    PAST MEDICAL & SURGICAL HISTORY  No significant past surgical history      SOCIAL HISTORY:    ALLERGIES:  No Known Allergies    MEDICATIONS:  STANDING MEDICATIONS  chlorhexidine 2% Cloths 1 Application(s) Topical daily  folic acid 1 milliGRAM(s) Oral daily  hepatitis B Vaccine - Adult (ENGERIX-B) 20 MICROGram(s) IntraMuscular once  influenza   Vaccine 0.5 milliLiter(s) IntraMuscular once  lactulose Syrup 20 Gram(s) Oral three times a day  midodrine. 5 milliGRAM(s) Oral three times a day  octreotide  Injectable 100 MICROGram(s) SubCutaneous three times a day  pantoprazole    Tablet 40 milliGRAM(s) Oral before breakfast  thiamine 100 milliGRAM(s) Oral daily    PRN MEDICATIONS  LORazepam   Injectable 2 milliGRAM(s) IntraMuscular every 2 hours PRN  traMADol 25 milliGRAM(s) Oral every 8 hours PRN    VITALS:   T(F): 98.2  HR: 100  BP: 123/77  RR: 18  SpO2: 98%    LABS:                        9.3    20.20 )-----------( 217      ( 22 Nov 2023 04:50 )             26.5     11-22    127<L>  |  95<L>  |  51<H>  ----------------------------<  101<H>  3.8   |  14<L>  |  1.8<H>    Ca    8.2<L>      22 Nov 2023 04:50  Mg     2.5     11-22    TPro  5.2<L>  /  Alb  3.5  /  TBili  31.8<HH>  /  DBili  x   /  AST  130<H>  /  ALT  16  /  AlkPhos  157<H>  11-22    PT/INR - ( 22 Nov 2023 04:50 )   PT: 25.80 sec;   INR: 2.24 ratio         PTT - ( 22 Nov 2023 04:50 )  PTT:41.7 sec  Urinalysis Basic - ( 22 Nov 2023 04:50 )    Color: x / Appearance: x / SG: x / pH: x  Gluc: 101 mg/dL / Ketone: x  / Bili: x / Urobili: x   Blood: x / Protein: x / Nitrite: x   Leuk Esterase: x / RBC: x / WBC x   Sq Epi: x / Non Sq Epi: x / Bacteria: x        Lactate, Blood: 2.4 mmol/L *H* (11-22-23 @ 11:48)          Liver failure      RADIOLOGY:    PHYSICAL EXAM:  General: WN/WD NAD  Neurology: A&Ox3, nonfocal, REYES x 4  Head:  Normocephalic, atraumatic  ENT:  Mucosa moist, no ulcerations  Neck:  Supple, no sinuses or palpable masses  Lymphatic:  No palpable cervical, supraclavicular, axillary or inguinal adenopathy  Respiratory: CTA B/L  CV: RRR, S1S2, no murmur  Abdominal: Soft, NT, distended, jaundiced  MSK: +1 bl le edema    Intravenous access: yes  NG tube: no  Chung Catheter: no

## 2023-11-22 NOTE — DISCHARGE NOTE NURSING/CASE MANAGEMENT/SOCIAL WORK - PATIENT PORTAL LINK FT
You can access the FollowMyHealth Patient Portal offered by [facility] by registering at the following website: [email or web address]. By joining [facility]’s FollowMyHealth portal, you will also be able to view your health information using other applications (apps) compatible with our system.

## 2023-11-22 NOTE — CONSULT NOTE ADULT - SUBJECTIVE AND OBJECTIVE BOX
WILLIAM SARABIA  28y, Male  Allergies    No Known Allergies    Intolerances        LOS  6d    HPI  HPI:  29 yo male previously healthy presented today for worsening jaundice and abdominal distended that started 2 weeks ago. Since his jaundice started, the patient began drinking a lot of fluids minimum 4L / day, his abdominal distention kept getting worse and his urine became darker, with oliguria. He reports SOB attributed to his abdomen pushing up on his diaphragm. Last BM yesterday, patient passing gas.  Patient denies any pain, fever, or any other signs of symptoms. No hx of mood disorder.   Social hx: former smoker stopped 2 years ago, has 3-4 PY.   alcohol: drinks every other day 4-5 beers, however last drink was 3 weeks ago.   No drugs., no allergies.     In the ED vitals sinus tachycardia   labs remarkable for WBC 16K, hb 10.7 (no BL), INR 5.69 , Na 119, AG 15, total bili 31.1 , aLP 175,   UA +VE  -ve alcohol level, acetaminophen and salicylate levels.    - CT abd pelv: Diffuse large amount of abdominal and pelvic ascites. Hepatosplenomegaly with right paracolic varices, findings suspicious for portal hypertension. Diffuse colonic wall thickening, possibly related to a diffuse colitis versus this severely edematous state of the patient. Nonspecific dilated loops of small bowel without definite evidence of obstruction.   Partial collapse of the right lower lobe, left lower lobe and right middle lobe.    - RUQ US : 1.  Enlarged fatty infiltrated liver (hepatomegaly with diffuse steatosis).  2.  Sludge within the gallbladder lumen which has a thickened edematous wall. No echogenic stones. Negative sonographic Carter's sign. Thickened edematous gallbladder wall is a nonspecific finding possibly related to volume status in this patient. 3.  Ascites is noted.    Patient admitted to SDU for severe hyponatremia, large abdominal ascites, and cholestatic liver injury.    (16 Nov 2023 02:40)      INFECTIOUS DISEASE HISTORY:  ID is consulted for antimicrobial recommendations.     Prior hospital charts reviewed [Yes]  Primary team notes reviewed [Yes]  Other consultant notes reviewed [Yes]    REVIEW OF SYSTEMS:  CONSTITUTIONAL: No fever or chills  HEENT: No sore throat  RESPIRATORY: No cough, no shortness of breath  CARDIOVASCULAR: No chest pain or palpitations  GASTROINTESTINAL: No abdominal or epigastric pain  GENITOURINARY: No dysuria  NEUROLOGICAL: No headache/dizziness  MSK: No joint pain, erythema, or swelling; no back pain  SKIN: No itching, rashes  All other ROS negative except noted above    PAST MEDICAL & SURGICAL HISTORY:  No significant past surgical history    SOCIAL HISTORY:  - No recent travel  - Alcohol dependence history.     FAMILY HISTORY: No pertinent PMH in first degree relatives.       ANTIMICROBIALS:      ANTIMICROBIALS (past 90 days):  MEDICATIONS  (STANDING):  cefTRIAXone   IVPB   100 mL/Hr IV Intermittent (11-19-23 @ 04:00)   100 mL/Hr IV Intermittent (11-18-23 @ 05:30)   100 mL/Hr IV Intermittent (11-17-23 @ 05:28)   100 mL/Hr IV Intermittent (11-16-23 @ 03:42)    metroNIDAZOLE  IVPB   100 mL/Hr IV Intermittent (11-19-23 @ 13:37)   100 mL/Hr IV Intermittent (11-19-23 @ 05:25)   100 mL/Hr IV Intermittent (11-18-23 @ 21:07)   100 mL/Hr IV Intermittent (11-18-23 @ 12:41)   100 mL/Hr IV Intermittent (11-18-23 @ 05:30)   100 mL/Hr IV Intermittent (11-17-23 @ 21:45)   100 mL/Hr IV Intermittent (11-17-23 @ 14:11)   100 mL/Hr IV Intermittent (11-17-23 @ 05:27)   100 mL/Hr IV Intermittent (11-16-23 @ 22:29)   100 mL/Hr IV Intermittent (11-16-23 @ 16:00)    metroNIDAZOLE  IVPB   100 mL/Hr IV Intermittent (11-16-23 @ 03:41)    rifAXIMin   550 milliGRAM(s) Oral (11-19-23 @ 05:24)   550 milliGRAM(s) Oral (11-18-23 @ 17:26)   550 milliGRAM(s) Oral (11-18-23 @ 12:42)        OTHER MEDS:   MEDICATIONS  (STANDING):  hepatitis B Vaccine - Adult (ENGERIX-B) 20 once  influenza   Vaccine 0.5 once  lactulose Syrup 20 three times a day  LORazepam   Injectable 2 every 2 hours PRN  midodrine. 5 three times a day  octreotide  Injectable 100 three times a day  pantoprazole    Tablet 40 before breakfast  traMADol 25 every 8 hours PRN      VITALS:  Vital Signs Last 24 Hrs  T(F): 98.2 (11-22-23 @ 15:51), Max: 100.5 (11-18-23 @ 12:07)    Vital Signs Last 24 Hrs  HR: 100 (11-22-23 @ 15:51) (100 - 102)  BP: 123/77 (11-22-23 @ 15:51) (95/50 - 123/77)  RR: 18 (11-22-23 @ 15:51)  SpO2: 98% (11-22-23 @ 15:51) (95% - 98%)  Wt(kg): --    EXAM:  GENERAL: NAD, lying in bed, Jaundiced  HEAD: No head lesions  NECK: Supple, nontender to palpation  CHEST/LUNG: Clear to auscultation bilaterally  HEART: S1 S2  ABDOMEN: Soft, nontender, distended.   EXTREMITIES: No clubbing, cyanosis, or petal edema  NERVOUS SYSTEM: Alert and oriented to person, time, place and situation, speech clear  MSK: No joint erythema, swelling or pain  SKIN: No rashes or lesions, no superficial thrombophlebitis    Labs:                        9.3    20.20 )-----------( 217      ( 22 Nov 2023 04:50 )             26.5     11-22    127<L>  |  95<L>  |  51<H>  ----------------------------<  101<H>  3.8   |  14<L>  |  1.8<H>    Ca    8.2<L>      22 Nov 2023 04:50  Mg     2.5     11-22    TPro  5.2<L>  /  Alb  3.5  /  TBili  31.8<HH>  /  DBili  x   /  AST  130<H>  /  ALT  16  /  AlkPhos  157<H>  11-22      WBC Trend:  WBC Count: 20.20 (11-22-23 @ 04:50)  WBC Count: 18.09 (11-21-23 @ 05:11)  WBC Count: 17.12 (11-20-23 @ 04:54)  WBC Count: 12.56 (11-19-23 @ 05:10)      Auto Neutrophil #: 14.68 K/uL (11-21-23 @ 05:11)  Auto Neutrophil #: 13.62 K/uL (11-20-23 @ 04:54)  Auto Neutrophil #: 10.00 K/uL (11-19-23 @ 05:10)  Auto Neutrophil #: 10.21 K/uL (11-18-23 @ 07:04)  Auto Neutrophil #: 10.94 K/uL (11-17-23 @ 05:00)      Creatine Trend:  Creatinine: 1.8 (11-22)  Creatinine: 1.4 (11-21)  Creatinine: 1.4 (11-20)  Creatinine: 1.3 (11-19)      Liver Biochemical Testing Trend:  Alanine Aminotransferase (ALT/SGPT): 16 (11-22)  Alanine Aminotransferase (ALT/SGPT): 14 (11-21)  Alanine Aminotransferase (ALT/SGPT): 12 (11-20)  Alanine Aminotransferase (ALT/SGPT): 12 (11-20)  Alanine Aminotransferase (ALT/SGPT): 13 (11-19)  Aspartate Aminotransferase (AST/SGOT): 130 (11-22-23 @ 04:50)  Aspartate Aminotransferase (AST/SGOT): 121 (11-21-23 @ 05:11)  Aspartate Aminotransferase (AST/SGOT): 112 (11-20-23 @ 21:35)  Aspartate Aminotransferase (AST/SGOT): 104 (11-20-23 @ 04:54)  Aspartate Aminotransferase (AST/SGOT): 111 (11-19-23 @ 21:14)  Bilirubin Total: 31.8 (11-22)  Bilirubin Total: 32.3 (11-21)  Bilirubin Total: 32.8 (11-20)  Bilirubin Direct: 20.6 (11-20)  Bilirubin Total: 34.0 (11-20)      Trend LDH      Auto Eosinophil %: 1.3 % (11-21-23 @ 05:11)  Auto Eosinophil %: 1.3 % (11-20-23 @ 04:54)      Urinalysis Basic - ( 22 Nov 2023 04:50 )    Color: x / Appearance: x / SG: x / pH: x  Gluc: 101 mg/dL / Ketone: x  / Bili: x / Urobili: x   Blood: x / Protein: x / Nitrite: x   Leuk Esterase: x / RBC: x / WBC x   Sq Epi: x / Non Sq Epi: x / Bacteria: x        MICROBIOLOGY:    Male    Culture - Blood (collected 19 Nov 2023 11:56)  Source: .Blood None  Preliminary Report (21 Nov 2023 22:01):    No growth at 48 Hours      HIV-1/2 Combo Result: Nonreact (11-21-23 @ 05:11)  HIV-1/2 Combo Result: Nonreact (11-16-23 @ 06:59)        CMV IgM Interpretation: Negative (11-21-23 @ 05:11)  Treponema Pallidum Antibody Interpretation: Negative (11-21-23 @ 05:11)  Toxoplasma IgG Interpretation: Negative (11-21-23 @ 05:11)  Toxoplasma IgM Interpretation: Negative (11-21-23 @ 05:11)  Cytomegalovirus By PCR: NotDetec IU/mL (11-18-23 @ 07:04)  Cytomegalovirus By PCR: NotDetec IU/mL (11-18-23 @ 07:00)      Rapid RVP Result: NotDetec (11-19 @ 08:50)          Procalcitonin, Serum: 2.41 (11-21)  Procalcitonin, Serum: 1.77 (11-20)      Ferritin: 1110 (11-18)  Ferritin: 1312 (11-16)            Lactate, Blood: 2.4 (11-22 @ 11:48)        INFLAMMATORY MARKERS      RADIOLOGY & ADDITIONAL TESTS:  I have personally reviewed the imagings.  CXR  < from: US Kidney and Bladder (11.22.23 @ 09:28) >  IMPRESSION:    No hydronephrosis    Bilateral ureteral jets are not identified, a nonspecific finding.    Abdominal and pelvic ascites    < end of copied text >  < from: Xray Kidney Ureter Bladder (11.22.23 @ 09:24) >  Findings/  impression:    Generalized gaseous distention of bowel.    < end of copied text >  < from: US Abdomen Complete (US Abdomen Complete .) (11.21.23 @ 16:48) >  Findings/  impression:    Moderate to large amount of abdominal pelvic ascites    < end of copied text >  < from: CT Abdomen and Pelvis w/ IV Cont (11.15.23 @ 20:58) >      IMPRESSION:    Diffuse large amount of abdominal and pelvic ascites.    Hepatosplenomegaly with right paracolic varices, findings suspicious for   portal hypertension.    Diffuse colonic wall thickening, possibly related to a diffuse colitis   versus this severely edematous state of the patient.    Nonspecific dilated loops of small bowel without definite evidence of   obstruction. Follow-up is recommended    Partial collapse of the right lower lobe, left lower lobe and right   middle lobe.    < end of copied text >      CT      CARDIOLOGY TESTING  12 Lead ECG:   Ventricular Rate 113 BPM    Atrial Rate 113 BPM    P-R Interval 140 ms    QRS Duration 86 ms    Q-T Interval 340 ms    QTC Calculation(Bazett) 466 ms    P Axis 56 degrees    R Axis -2 degrees    T Axis 1 degrees    Diagnosis Line Sinus tachycardia  Poor R wave progression , probably due to lead misplacement.  Abnormal ECG    Confirmed by RIOS DE JESUS, ETHEL ([id number]) on 11/15/2023 10:42:15 PM (11-15-23 @ 18:21)

## 2023-11-22 NOTE — DISCHARGE NOTE PROVIDER - HOSPITAL COURSE
29 yo male previously healthy presented today for worsening jaundice and abdominal distended that started 2 weeks ago. Since his jaundice started, the patient began drinking a lot of fluids minimum 4L / day, his abdominal distention kept getting worse and his urine became darker, with oliguria. He reports SOB attributed to his abdomen pushing up on his diaphragm. Last BM yesterday, patient passing gas.  Patient denies any pain, fever, or any other signs of symptoms. No hx of mood disorder.   Social hx: former smoker stopped 2 years ago, has 3-4 PY.   alcohol: drinks every other day 4-5 beers, however last drink was 3 weeks ago.   No drugs., no allergies.     In the ED vitals sinus tachycardia   labs remarkable for WBC 16K, hb 10.7 (no BL), INR 5.69 , Na 119, AG 15, total bili 31.1 , aLP 175,   UA +VE  -ve alcohol level, acetaminophen and salicylate levels.    - CT abd pelv: Diffuse large amount of abdominal and pelvic ascites. Hepatosplenomegaly with right paracolic varices, findings suspicious for portal hypertension. Diffuse colonic wall thickening, possibly related to a diffuse colitis versus this severely edematous state of the patient. Nonspecific dilated loops of small bowel without definite evidence of obstruction.   Partial collapse of the right lower lobe, left lower lobe and right middle lobe.    - RUQ US : 1.  Enlarged fatty infiltrated liver (hepatomegaly with diffuse steatosis).  2.  Sludge within the gallbladder lumen which has a thickened edematous wall. No echogenic stones. Negative sonographic Carter's sign. Thickened edematous gallbladder wall is a nonspecific finding possibly related to volume status in this patient. 3.  Ascites is noted.    Patient admitted to SDU for severe hyponatremia, large abdominal ascites, and cholestatic liver injury.     #Acute decompensated liver failure likely ETOH use  #ascites s/p paracentesis  Elevated INR  used to be a daily 4 beer/day drinker with episodes of binge drinking, stopped 1 month ago  - CT abd pelvis:   a) Diffuse large amount of abdominal and pelvic ascites. Hepatosplenomegaly with right paracolic varices, findings suspicious for portal hypertension. Diffuse colonic wall thickening, possibly related to a diffuse colitis versus this severely edematous state of the patient. Nonspecific dilated loops of small bowel without definite evidence of obstruction.   b) Partial collapse of the right lower lobe, left lower lobe and right middle lobe.  - RUQ US :   a)  Enlarged fatty infiltrated liver (hepatomegaly with diffuse steatosis).  b) Sludge within the gallbladder lumen which has a thickened edematous wall. No echogenic stones. Negative sonographic Carter's sign. Thickened edematous gallbladder wall is a nonspecific finding possibly related to volume status in this patient.   c)  Ascites is noted.  - s/p paracentesis 11/16 with 3.2 L removed, no evidence of SBP. Now off antibiotics. frequent abdominal exams   - s/p Albumin, Vit K. Monitor Mental status  - avoid hepatotoxic drugs (NSAIDs)  - monitor LFts/INR q12H  - hepatology following, patient continues to refuse LT center transfer  **Please notify hepatology with any change on mental status or worsening INR for immediate LT center transfer  - blood cx NGTD (11/16), f/u peritoneal fluid cultures  - c/w thiamine + folate  - repeat BCx (11/19): NGTD  - procal 1.77  - procedure team consulted (11/20) for diagnostic and therapeutic paracentesis> pocket not large enough and was deferred for now   - c/w Midodrine 5mg TID, octreotide 100mg TID subq (agreeable)// hepatology offered terlipressin but pt would like to hold off given side effects  - gave albumin 25% 300ml x1 11/22 and c/w albumin 20 g QD starting 11/23  - f/u hepatology antibodies, so far all negative    #Severe Subacute hyponatremia - likely hypervolemic 2/2 increased free water intake  - Na stable 130 > 127  - nephro: ADH presence c/w fluid restriction  - f/u urine NA, osm, serum Na    #YANDY  patient asymptomatic   - BMP monitoring Q8H  - per GI, can dc fluid restriction but avoid IVF   - Nephrology following, c.w midodrine 5 q8H  - Hepatology: can use eplerenone and furosemide when creatinine improves (pt doesn't want spironolactone), avoid prednisone   - f/u KUB: no hydronephrosis, abd+pelvic ascites    #Normocytic anemia with no signs of bleed, monitor    # HCC screening:  - Please f/u as outpatient for US abdomen and AFP every 6 months.  - outpatient EGD for variceal screening  - needs HepB vaccine, hepA/Influenza/Pneumococcal vaccines.  Overall prognosis is guarded    Patient requiring liver transplant, will be transferred to [facility]. 27 yo male previously healthy presented today for worsening jaundice and abdominal distended that started 2 weeks ago. Since his jaundice started, the patient began drinking a lot of fluids minimum 4L / day, his abdominal distention kept getting worse and his urine became darker, with oliguria. He reports SOB attributed to his abdomen pushing up on his diaphragm. Last BM yesterday, patient passing gas.  Patient denies any pain, fever, or any other signs of symptoms. No hx of mood disorder.   Social hx: former smoker stopped 2 years ago, has 3-4 PY.   alcohol: drinks every other day 4-5 beers, however last drink was 3 weeks ago.   No drugs., no allergies.     In the ED vitals sinus tachycardia   labs remarkable for WBC 16K, hb 10.7 (no BL), INR 5.69 , Na 119, AG 15, total bili 31.1 , aLP 175,   UA +VE  -ve alcohol level, acetaminophen and salicylate levels.    - CT abd pelv: Diffuse large amount of abdominal and pelvic ascites. Hepatosplenomegaly with right paracolic varices, findings suspicious for portal hypertension. Diffuse colonic wall thickening, possibly related to a diffuse colitis versus this severely edematous state of the patient. Nonspecific dilated loops of small bowel without definite evidence of obstruction.   Partial collapse of the right lower lobe, left lower lobe and right middle lobe.    - RUQ US : 1.  Enlarged fatty infiltrated liver (hepatomegaly with diffuse steatosis).  2.  Sludge within the gallbladder lumen which has a thickened edematous wall. No echogenic stones. Negative sonographic Carter's sign. Thickened edematous gallbladder wall is a nonspecific finding possibly related to volume status in this patient. 3.  Ascites is noted.    Patient admitted to SDU for severe hyponatremia, large abdominal ascites, and cholestatic liver injury.     #Acute decompensated liver failure likely ETOH use  #ascites s/p paracentesis  Elevated INR  used to be a daily 4 beer/day drinker with episodes of binge drinking, stopped 1 month ago  - CT abd pelvis:   a) Diffuse large amount of abdominal and pelvic ascites. Hepatosplenomegaly with right paracolic varices, findings suspicious for portal hypertension. Diffuse colonic wall thickening, possibly related to a diffuse colitis versus this severely edematous state of the patient. Nonspecific dilated loops of small bowel without definite evidence of obstruction.   b) Partial collapse of the right lower lobe, left lower lobe and right middle lobe.  - RUQ US :   a)  Enlarged fatty infiltrated liver (hepatomegaly with diffuse steatosis).  b) Sludge within the gallbladder lumen which has a thickened edematous wall. No echogenic stones. Negative sonographic Carter's sign. Thickened edematous gallbladder wall is a nonspecific finding possibly related to volume status in this patient.   c)  Ascites is noted.  - s/p paracentesis 11/16 with 3.2 L removed, no evidence of SBP. Now off antibiotics. frequent abdominal exams   - s/p Albumin, Vit K. Monitor Mental status  - avoid hepatotoxic drugs (NSAIDs)  - monitor LFts/INR q12H  - hepatology following, patient continues to refuse LT center transfer  **Please notify hepatology with any change on mental status or worsening INR for immediate LT center transfer  - blood cx NGTD (11/16), f/u peritoneal fluid cultures  - c/w thiamine + folate  - repeat BCx (11/19): NGTD  - procal 1.77  - procedure team consulted (11/20) for diagnostic and therapeutic paracentesis> pocket not large enough and was deferred for now   - c/w Midodrine 5mg TID, octreotide 100mg TID subq (agreeable)// hepatology offered terlipressin but pt would like to hold off given side effects  - gave albumin 25% 300ml x1 11/22 and c/w albumin 20 g QD starting 11/23  - f/u hepatology antibodies, so far all negative    #Severe Subacute hyponatremia - likely hypervolemic 2/2 increased free water intake  - Na stable 130 > 127  - nephro: ADH presence c/w fluid restriction  - f/u urine NA, osm, serum Na    #YANDY  patient asymptomatic   - BMP monitoring Q8H  - per GI, can dc fluid restriction but avoid IVF   - Nephrology following, c.w midodrine 5 q8H  - Hepatology: can use eplerenone and furosemide when creatinine improves (pt doesn't want spironolactone), avoid prednisone   - f/u KUB: no hydronephrosis, abd+pelvic ascites    #Normocytic anemia with no signs of bleed, monitor    # HCC screening:  - Please f/u as outpatient for US abdomen and AFP every 6 months.  - outpatient EGD for variceal screening  - needs HepB vaccine, hepA/Influenza/Pneumococcal vaccines.  Overall prognosis is guarded    Patient requiring liver transplant, will be transferred to Liver transplant center, family wants [facility], verifying availability. 29 yo male previously healthy presented today for worsening jaundice and abdominal distended that started 2 weeks ago. Since his jaundice started, the patient began drinking a lot of fluids minimum 4L / day, his abdominal distention kept getting worse and his urine became darker, with oliguria. He reports SOB attributed to his abdomen pushing up on his diaphragm. Last BM yesterday, patient passing gas.  Patient denies any pain, fever, or any other signs of symptoms. No hx of mood disorder.   Social hx: former smoker stopped 2 years ago, has 3-4 PY.   alcohol: drinks every other day 4-5 beers, however last drink was 3 weeks ago.   No drugs., no allergies.     In the ED vitals sinus tachycardia   labs remarkable for WBC 16K, hb 10.7 (no BL), INR 5.69 , Na 119, AG 15, total bili 31.1 , aLP 175,   UA +VE  -ve alcohol level, acetaminophen and salicylate levels.    - CT abd pelv: Diffuse large amount of abdominal and pelvic ascites. Hepatosplenomegaly with right paracolic varices, findings suspicious for portal hypertension. Diffuse colonic wall thickening, possibly related to a diffuse colitis versus this severely edematous state of the patient. Nonspecific dilated loops of small bowel without definite evidence of obstruction.   Partial collapse of the right lower lobe, left lower lobe and right middle lobe.    - RUQ US : 1.  Enlarged fatty infiltrated liver (hepatomegaly with diffuse steatosis).  2.  Sludge within the gallbladder lumen which has a thickened edematous wall. No echogenic stones. Negative sonographic Carter's sign. Thickened edematous gallbladder wall is a nonspecific finding possibly related to volume status in this patient. 3.  Ascites is noted.    Patient admitted to SDU for severe hyponatremia, large abdominal ascites, and cholestatic liver injury.     #Acute decompensated liver failure likely ETOH use  #ascites s/p paracentesis  Elevated INR  used to be a daily 4 beer/day drinker with episodes of binge drinking, stopped 1 month ago  - CT abd pelvis:   a) Diffuse large amount of abdominal and pelvic ascites. Hepatosplenomegaly with right paracolic varices, findings suspicious for portal hypertension. Diffuse colonic wall thickening, possibly related to a diffuse colitis versus this severely edematous state of the patient. Nonspecific dilated loops of small bowel without definite evidence of obstruction.   b) Partial collapse of the right lower lobe, left lower lobe and right middle lobe.  - RUQ US :   a)  Enlarged fatty infiltrated liver (hepatomegaly with diffuse steatosis).  b) Sludge within the gallbladder lumen which has a thickened edematous wall. No echogenic stones. Negative sonographic Carter's sign. Thickened edematous gallbladder wall is a nonspecific finding possibly related to volume status in this patient.   c)  Ascites is noted.  - s/p paracentesis 11/16 with 3.2 L removed, no evidence of SBP. Now off antibiotics. frequent abdominal exams   - s/p Albumin, Vit K. Monitor Mental status  - avoid hepatotoxic drugs (NSAIDs)  - monitor LFts/INR q12H  - hepatology following, patient continues to refuse LT center transfer  **Please notify hepatology with any change on mental status or worsening INR for immediate LT center transfer  - blood cx NGTD (11/16), f/u peritoneal fluid cultures  - c/w thiamine + folate  - repeat BCx (11/19): NGTD  - procal 1.77  - procedure team consulted (11/20) for diagnostic and therapeutic paracentesis> pocket not large enough and was deferred for now   - c/w Midodrine 5mg TID, octreotide 100mg TID subq (agreeable)// hepatology offered terlipressin but pt would like to hold off given side effects  - gave albumin 25% 300ml x1 11/22 and c/w albumin 20 g QD starting 11/23  - f/u hepatology antibodies, so far all negative    #Severe Subacute hyponatremia - likely hypervolemic 2/2 increased free water intake  - Na stable 130 > 127  - nephro: ADH presence c/w fluid restriction  - f/u urine NA, osm, serum Na    #YANDY  patient asymptomatic   - BMP monitoring Q8H  - per GI, can dc fluid restriction but avoid IVF   - Nephrology following, c.w midodrine 5 q8H  - Hepatology: can use eplerenone and furosemide when creatinine improves (pt doesn't want spironolactone), avoid prednisone   - f/u KUB: no hydronephrosis, abd+pelvic ascites    #Normocytic anemia with no signs of bleed, monitor    # HCC screening:  - Please f/u as outpatient for US abdomen and AFP every 6 months.  - outpatient EGD for variceal screening  - needs HepB vaccine, hepA/Influenza/Pneumococcal vaccines.  Overall prognosis is guarded    Patient requiring liver transplant, will be transferred to Liver transplant center, family wants [facility].    Attending: Discussed with Dr. Becky Melendez hepatologist at [facility] and accepting physician, patient is accepted for transfer, will try for tonight.  Requesting results / CT scans / H&P / hepatology note to be included, requested information has been printed out and added to the physical chart.

## 2023-11-22 NOTE — PROGRESS NOTE ADULT - ASSESSMENT
27 yo male previously healthy presented today for worsening jaundice and abdominal distention that started 2 weeks ago. Since his jaundice started, the patient began drinking a lot of fluids minimum 4L / day, his abdominal distention kept getting worse and his urine became darker, with oliguria. He reports SOB attributed to his abdomen pushing up on his diaphragm.     #Acute decompensated liver failure likely ETOH use  #ascites s/p paracentesis  Elevated INR  used to be a daily 4 beer/day drinker with episodes of binge drinking, stopped 1 month ago  - CT abd pelvis:   a) Diffuse large amount of abdominal and pelvic ascites. Hepatosplenomegaly with right paracolic varices, findings suspicious for portal hypertension. Diffuse colonic wall thickening, possibly related to a diffuse colitis versus this severely edematous state of the patient. Nonspecific dilated loops of small bowel without definite evidence of obstruction.   b) Partial collapse of the right lower lobe, left lower lobe and right middle lobe.  - RUQ US :   a)  Enlarged fatty infiltrated liver (hepatomegaly with diffuse steatosis).  b) Sludge within the gallbladder lumen which has a thickened edematous wall. No echogenic stones. Negative sonographic Carter's sign. Thickened edematous gallbladder wall is a nonspecific finding possibly related to volume status in this patient.   c)  Ascites is noted.  - s/p paracentesis 11/16 with 3.2 L removed, no evidence of SBP. Now off antibiotics. frequent abdominal exams   - s/p Albumin, Vit K. Monitor Mental status  - avoid hepatotoxic drugs (NSAIDs)  - monitor LFts/INR q12H  - hepatology following, patient continues to refuse LT center transfer  **Please notify hepatology with any change on mental status or worsening INR for immediate LT center transfer  - blood cx NGTD (11/16), f/u peritoneal fluid cultures  - c/w thiamine + folate  - repeat BCx (11/19): NGTD  - procal 1.77  - procedure team consulted (11/20) for diagnostic and therapeutic paracentesis> pocket not large enough and was deferred for now   - c/w Midodrine 5mg TID, octreotide 100mg TID subq (agreeable)// hepatology offered terlipressin but pt would like to hold off given side effects  - gave albumin 25% 300ml x1 11/22 and will c/w albumin 20 g QD starting 11/23    #Severe Subacute hyponatremia - likely hypervolemic 2/2 increased free water intake  - Na worsening 130 > 127  - nephro: ADH presence c/w fluid restriction  - hepatology: stop fluid restriction  - f/u nephro  - f/u urine NA, osm, serum Na    #YANDY  patient asymptomatic   - BMP monitoring Q8H  - per GI, can dc fluid restriction but avoid IVF   - Nephrology following, c.w midodrine 5 q8H  - Hepatology: can use eplerenone and furosemide when creatinine improves (pt doesn't want spironolactone), avoid prednisone   - f/u hepatology antibodies  - f/u KUB:     #Normocytic anemia with no signs of bleed, monitor    # HCC screening:  - Please f/u as outpatient for US abdomen and AFP every 6 months.  - outpatient EGD for variceal screening  - needs HepB vaccine, hepA/Influenza/Pneumococcal vaccines.  Overall prognosis is guarded   **Please notify hepatology with any change on mental status or worsening INR for immediate LT center transfer    Handoff: f/u 8am cortisol, f/u lactate, Cr, INR/LFT q12h, f/u hep antibodies, f/u PT, f/u nephro (Na), c/w albumin, f/u hep, f/u ID and cultures for WBC trending up

## 2023-11-22 NOTE — PROGRESS NOTE ADULT - ASSESSMENT
27 yo male previously healthy presented today for worsening jaundice and abdominal distention that started 2 weeks ago. Since his jaundice started, the patient began drinking a lot of fluids minimum 4L / day, his abdominal distention kept getting worse and his urine became darker, with oliguria. He reports SOB attributed to his abdomen pushing up on his diaphragm.  Found here to have severe alcoholic hepatitis.     Assessment    Acute liver failure secondary to severe alcoholic hepatitis  Portal hypertension and varices on CT  Likely HRS    Plan    - had been on lasix / eplerenone here with no improvement in ascites  - having increasing YANDY, creatinine trending up likely from HRS, patient endorsed today that he hasn't urinated since yesterday  - received albumin / c/w octreotide / midodrine  - discussed extensively with patient and family the urgent nature of his need for a liver transplant, patient discussed further with    - CT abd pelvis:   a) Diffuse large amount of abdominal and pelvic ascites. Hepatosplenomegaly with right paracolic varices, findings suspicious for portal hypertension. Diffuse colonic wall thickening, possibly related to a diffuse colitis versus this severely edematous state of the patient. Nonspecific dilated loops of small bowel without definite evidence of obstruction.   b) Partial collapse of the right lower lobe, left lower lobe and right middle lobe.  - RUQ US :   a)  Enlarged fatty infiltrated liver (hepatomegaly with diffuse steatosis).  b) Sludge within the gallbladder lumen which has a thickened edematous wall. No echogenic stones. Negative sonographic Carter's sign. Thickened edematous gallbladder wall is a nonspecific finding possibly related to volume status in this patient.   c)  Ascites is noted.  - s/p paracentesis 11/16 with 3.2 L removed, no evidence of SBP. Now off antibiotics. frequent abdominal exams. FU repeat blood cx and procal   - s/p Albumin, Vit K. Monitor Mental status  - started on albumin 20 g and octreotide, patient now agreeable to octreotide. Discussed extensively with him and father at bedside   - monitor LFts/INR q8H  - hepatology following, patient continues to refuse LT center transfer    Severe Subacute hyponatremia - likely hypervolemic 2/2 increased free water intake slowly correcting   YANDY  patient asymptomatic   - BMP monitoring Q8H  - per GI, can dc fluid restriction but avoid IVF   - Nephrology following, c.w midodrine 5 q8H    Normocytic anemia with no signs of bleed, monitor    Overall prognosis is guarded, if worsening mental status or coagulopathy, will need urgent transfer to tplant center, hepatology on board     Patient seen at bedside, total time spent to evaluate and treat the patient's acute illness and chronic medical conditions as well as time spent reviewing labs, radiology, discussing medical plan with covering medical team was more than 50 minutes with >50% of time spent face to face with patient, discussing with patient and father at bedside, as well as coordination of care    27 yo male previously healthy presented today for worsening jaundice and abdominal distention that started 2 weeks ago. Since his jaundice started, the patient began drinking a lot of fluids minimum 4L / day, his abdominal distention kept getting worse and his urine became darker, with oliguria. He reports SOB attributed to his abdomen pushing up on his diaphragm.  Found here to have severe alcoholic hepatitis.     Assessment    ·	Acute liver failure secondary to severe alcoholic hepatitis // MELD > 30  MDF > 32  ·	Moderate - severe ascites s/p paracentesis 3.2L removed  ·	Portal hypertension and varices on CT  ·	Likely HRS    Plan    - had been on lasix / eplerenone here with no improvement in ascites, discontinued as per hepatology  - having increasing YANDY, creatinine trending up likely from HRS, patient endorsed today that he hasn't urinated since yesterday  - received albumin / c/w octreotide / midodrine  - discussed extensively with patient and family the urgent nature of his need for a liver transplant, patient discussed further with hepatology:  Patient is agreeable to transfer only to [facility].  Discussed with [facility] with hepatologist Dr. Becky Melendez, accepting the patient for transfer on send-back agreement.  As patient is not on pressors, Dr. Melendez is agreeable for lateral transfer if patient can be downgraded to med-surg.  Downgraded patient although having him stay in the CEU, [facility] sending transport to  patient in about an hour.    Pending: transfer to [facility]    # DVT PPX: Jordan

## 2023-11-22 NOTE — PROGRESS NOTE ADULT - ASSESSMENT
IMPRESSION:    Portal Hypertension/Hepatomegaly/Right Paracolic Varices/Alcoholic Hepatitis/Steatosis/Liver failure  Hyponatremia  Hepatorenal Syndrome/ YANDY worsening  increase WBC no fever  Transaminitis, downtrending   Abdominal/Pelvic Ascites SP Paracentesis 3.2L      PLAN:    CNS: mental status at baseline, thiamine, folate    HEENT: Oral care    PULMONARY:  HOB @ 45 degrees.  Aspiration precautions. on room air, incentive spirometry    CARDIOVASCULAR: avoid overload, midodrine    GI: GI prophylaxis. Hepatology follow up,  SP albumin challenge, diuretics on hold secondary to YANDY, patient declined transfer to liver transplant center     RENAL:  Follow up lytes.  Correct as needed, u na noted, renal eval renal us    INFECTIOUS DISEASE: procal, repeat cx    HEMATOLOGICAL:  DVT prophylaxis. maintain active type and screen, transfuse if Hgb<7.0    ENDOCRINE:  Follow up FS.  Insulin protocol if needed.    MUSCULOSKELETAL: activity as tolerated     SDU   poor prognosis

## 2023-11-22 NOTE — DISCHARGE NOTE PROVIDER - NSDCCPCAREPLAN_GEN_ALL_CORE_FT
PRINCIPAL DISCHARGE DIAGNOSIS  Diagnosis: Liver failure  Assessment and Plan of Treatment: You were admitted for acute decompensated liver failure likely due to alcohol use. Liver failure is life threatening and is causing your kidneys to fail. You likely need liver transplant and will be transferred to LT center ([facility]).      SECONDARY DISCHARGE DIAGNOSES  Diagnosis: Hepatosplenomegaly  Assessment and Plan of Treatment:     Diagnosis: Hyponatremia  Assessment and Plan of Treatment:      PRINCIPAL DISCHARGE DIAGNOSIS  Diagnosis: Liver failure  Assessment and Plan of Treatment: You were admitted for acute decompensated liver failure likely due to alcohol use. Liver failure is life threatening and is causing your kidneys to fail. You likely need liver transplant and will be transferred to [facility] under the care of hepatologist Dr. Becky Melendez.  Your case was discussed with her in detail, and paperwork involving your case is being sent to [facility].  As discussed with you and your mother, if for any reason your liver transplant isn't done, you will be transferred back here for treatment going forward, this is a common agreement between hospitals and is not specific to your case.      SECONDARY DISCHARGE DIAGNOSES  Diagnosis: Hepatosplenomegaly  Assessment and Plan of Treatment:     Diagnosis: Hyponatremia  Assessment and Plan of Treatment:

## 2023-11-22 NOTE — PROGRESS NOTE ADULT - ASSESSMENT
27 yo male previously healthy presented today for worsening jaundice and abdominal distention   that started 2 weeks ago. patient was admitted for etoh hepatitis.    # Severe alcoholic  hepatitis MDF > 32  # ascites s/p paracentesis; abdomen distension+  # Decompensated liver  cirrhosis likely due to CATRACHO. varices in CT scan   # YANDY likely HRS- worsening  # Coagulopathy   No encephalopathy   MELD3: 32 11/18. 11/19; 33 (11/20); 36 (11/22)  MELD-Na: 34 11/18.11/19; 34 (11/20); 36 (11/22)    - CT abd pelv: Diffuse large amount of abdominal and pelvic ascites. Hepatosplenomegaly with right paracolic varices, findings suspicious for portal hypertension.   - RUQ US : 1.  Enlarged fatty infiltrated liver (hepatomegaly with diffuse steatosis). Ascites is noted.  T dylon 32.6       ALT 11  11-19-23 @ 05:10  T dylon 33.0       ALT 10  11-18-23 @ 21:35  T dylon 31.5       ALT 12  11-18-23 @ 07:04  T dylon 30.5       ALT 14  11-17-23 @ 19:44  T dylon 34.3   AP --  AST --  ALT --  11-17-23 @ 18:26       Hepatitis A IgM, Hepatitis B core IgM, core Ab total, surface Ag, surface Ab, HCV antibody, HCV RNA  - negative  GIOVANY, AMA, anti-Smooth Muscle Ab type 1, Anti liver-kidney microsomal Ab, Anti-soluble liver Ag, immunoglobulin panel,  Quantiferon level, Iron studies and ceruloplasmin, CMV PCR, EBV PCR, HSV IgM  - pending  - s/p 3.2 L paracentesis , SBP ruled out  - CMV IgM, CMV IgG, CMV PCR, Hep E IgG, HSA Ab, HIV, Syphilis scree, toxoplasma Ab. measles Ab, Mumps Ab, Rubella AB, Hep A total IgG, PeTH level, Utox and serum drug screen (LT workup)  - pt creatinine worsening, zero urine output; could not be tapped due to small pocket    PLAN   - pt and family agreeable for [facility] transfer for LT evaluation  Hold prednisone with YANDY  will hold YANDY hold diuretics. Does not want spironolactone, so can use eplerenone and furosemide when creatinine improves.   c/w Midodrine 5mg TID, octreotide 100mg TID subq (agreeable), offered terlipressin but pt would like to hold off given side effects  recommend to stop lactulose give gaseous distension, change to Miralax  Please give Hepatitis B vaccine as pt non immune  INR and LFT Q12   Please avoid hepatotoxic medications  If encephalopathy or worsening coagulopathy will need urgent transfer .    # HCC screening:  - Please f/u as outpatient for US abdomen and AFP every 6 months.  - outpatient EGD for variceal screening    # Lifestyle/Dietary modifications  - Calorie intake 25-40 Kcal/kg/day  - Protein intake: 1.2-1.5 gm/kg/day  - Avoid smoking, alcohol, NSAIDS.  - Abstain from alcohol and all illicit drugs  -Avoid use of herbal and dietary supplements due to potential hepatotoxicity  -Limit use of acetaminophen to <2 grams per day  -Avoid use of nonsteroidal antiinflammatory drugs (NSAIDs)    -Avoid eating any unpasteurized dairy products; avoid eating any raw or undercooked eggs, fish, poultry, or meat; and avoid eating raw/steamed oysters or other shellfish to avoid risk of Vibrio infection.    #Vaccinations:  Please f/u in clinic for being uptodate with HAV/HBV/Influenza/Pneumococcal vaccines.    #LT evaluation  Social status: Lives with mother and brother. Brother volunteered to be a donor. 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

## 2023-11-22 NOTE — CONSULT NOTE ADULT - ASSESSMENT
ASSESSMENT  This is 27 yo male previously healthy presented today for worsening jaundice and abdominal distention that started 2 weeks ago.     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   #Acute hepatitis Screen negative.     RECOMMENDATIONS  -Would recommend holding off antimicrobials. Do not think he has active infection currently.   -HIV screen negative, Non immune to hepatitis B. Quantiferon negative, MMR immune.  -Obtain CMV IgG for completion of workup for transplant, (CMV Igm and PCR negtative), EBV IgG positive, Toxoplasma IgG negative.   -Would proceed with vaccinations as per the hepatology team- HAV/HBV/Influenza/Pneumococcal and Tdap. vaccines.   -Remaining management as per the hepatology team.     If any questions, please send a message or call on [facility] Teams  Please continue to update ID with any pertinent new laboratory or radiographic findings.    Chip Craven M.D  Infectious Diseases Attending  [facility]    ASSESSMENT  This is 29 yo male previously healthy presented today for worsening jaundice and abdominal distention that started 2 weeks ago.     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   #Acute hepatitis Screen negative.     RECOMMENDATIONS  -Would recommend holding off antimicrobials. Do not think he has active infection currently.   -HIV screen negative, Non immune to hepatitis B. Quantiferon negative, MMR immune.  -Obtain CMV IgG for completion of workup for transplant, (CMV Igm and PCR negtative), EBV IgG positive, Toxoplasma IgG negative.   -Would proceed with vaccinations as per the hepatology team- HAV/HBV/Influenza/Pneumococcal and Tdap. vaccines.   -In an event of hemodynamic instability, would recommend repeating blood cultures, paracentesis for cultures/cell count. And can be initiated on IV cefepime/flagyll  -Remaining management as per the hepatology team.     If any questions, please send a message or call on [facility] Teams  Please continue to update ID with any pertinent new laboratory or radiographic findings.    Chip Craven M.D  Infectious Diseases Attending  [facility]

## 2023-11-22 NOTE — PROGRESS NOTE ADULT - SUBJECTIVE AND OBJECTIVE BOX
Over Night Events: events noted, hepato reviewed, afebrile    PHYSICAL EXAM    ICU Vital Signs Last 24 Hrs  T(C): 36.1 (22 Nov 2023 04:18), Max: 36.8 (21 Nov 2023 15:45)  T(F): 97 (22 Nov 2023 04:18), Max: 98.3 (21 Nov 2023 15:45)  HR: 101 (22 Nov 2023 04:18) (100 - 103)  BP: 109/62 (22 Nov 2023 04:18) (107/62 - 116/65)  BP(mean): 83 (21 Nov 2023 20:15) (77 - 83)  RR: 20 (22 Nov 2023 04:18) (20 - 20)  SpO2: 97% (22 Nov 2023 04:18) (97% - 100%)    O2 Parameters below as of 21 Nov 2023 15:45  Patient On (Oxygen Delivery Method): nasal cannula            General: icteric sclera  Lungs: Bilateral BS  Cardiovascular: Regular   Abdomen: Soft, Positive BS, distended  Extremities: No clubbing   Skin: Warm  Neurological: Non focal       11-21-23 @ 07:01  -  11-22-23 @ 06:57  --------------------------------------------------------  IN:  Total IN: 0 mL    OUT:    Intermittent Catheterization - Urethral (mL): 300 mL  Total OUT: 300 mL    Total NET: -300 mL          LABS:                          9.3    20.20 )-----------( 217      ( 22 Nov 2023 04:50 )             26.5                                               11-22    127<L>  |  95<L>  |  51<H>  ----------------------------<  101<H>  3.8   |  14<L>  |  1.8<H>    Ca    8.2<L>      22 Nov 2023 04:50  Mg     2.3     11-21    TPro  5.2<L>  /  Alb  3.5  /  TBili  31.8<HH>  /  DBili  x   /  AST  130<H>  /  ALT  16  /  AlkPhos  157<H>  11-22      PT/INR - ( 22 Nov 2023 04:50 )   PT: 25.80 sec;   INR: 2.24 ratio         PTT - ( 22 Nov 2023 04:50 )  PTT:41.7 sec                                       Urinalysis Basic - ( 22 Nov 2023 04:50 )    Color: x / Appearance: x / SG: x / pH: x  Gluc: 101 mg/dL / Ketone: x  / Bili: x / Urobili: x   Blood: x / Protein: x / Nitrite: x   Leuk Esterase: x / RBC: x / WBC x   Sq Epi: x / Non Sq Epi: x / Bacteria: x                                                  LIVER FUNCTIONS - ( 22 Nov 2023 04:50 )  Alb: 3.5 g/dL / Pro: 5.2 g/dL / ALK PHOS: 157 U/L / ALT: 16 U/L / AST: 130 U/L / GGT: x                                                  Culture - Blood (collected 19 Nov 2023 11:56)  Source: .Blood None  Preliminary Report (21 Nov 2023 22:01):    No growth at 48 Hours                                                                                           MEDICATIONS  (STANDING):  chlorhexidine 2% Cloths 1 Application(s) Topical daily  folic acid 1 milliGRAM(s) Oral daily  hepatitis B Vaccine - Adult (ENGERIX-B) 20 MICROGram(s) IntraMuscular once  influenza   Vaccine 0.5 milliLiter(s) IntraMuscular once  lactulose Syrup 20 Gram(s) Oral three times a day  midodrine. 5 milliGRAM(s) Oral three times a day  octreotide  Injectable 100 MICROGram(s) SubCutaneous three times a day  pantoprazole    Tablet 40 milliGRAM(s) Oral before breakfast  thiamine 100 milliGRAM(s) Oral daily    MEDICATIONS  (PRN):  LORazepam   Injectable 2 milliGRAM(s) IntraMuscular every 2 hours PRN Symptom-triggered: 2 point increase in CIWA -Ar score and a total score of 7 or LESS  traMADol 25 milliGRAM(s) Oral every 8 hours PRN Severe Pain (7 - 10)

## 2023-11-22 NOTE — PROGRESS NOTE ADULT - REASON FOR ADMISSION
cholestasis, abdominal ascites, hyponatremia

## 2023-11-22 NOTE — DISCHARGE NOTE PROVIDER - CARE PROVIDER_API CALL
Rossy Ray  Internal Medicine  [address] [address]  Phone: [phone]  Fax: [phone]  Follow Up Time: 2 weeks  
(0) independent

## 2023-11-22 NOTE — DISCHARGE NOTE NURSING/CASE MANAGEMENT/SOCIAL WORK - NSDCVIVACCINE_GEN_ALL_CORE_FT
Tdap; 16-Sep-2022 01:59; Ashley Anderson (MAEVE); [employer]; [id number] (Exp. Date: 04-Nov-2024); IntraMuscular; Deltoid Left.; 0.5 milliLiter(s); VIS (VIS Published: 09-May-2013, VIS Presented: 16-Sep-2022);

## 2023-11-22 NOTE — CONSULT NOTE ADULT - REASON FOR ADMISSION
cholestasis, abdominal ascites, hyponatremia

## 2023-11-22 NOTE — DISCHARGE NOTE PROVIDER - ATTENDING DISCHARGE PHYSICAL EXAMINATION:
General: WN/WD NAD  Neurology: A&Ox3, nonfocal, REYES x 4  Head:  Normocephalic, atraumatic  ENT:  Mucosa moist, no ulcerations  Neck:  Supple, no sinuses or palpable masses  Lymphatic:  No palpable cervical, supraclavicular, axillary or inguinal adenopathy  Respiratory: CTA B/L  CV: RRR, S1S2, no murmur  Abdominal: Soft, NT, distended  MSK: +1 bl le edema, jaundice

## 2023-11-22 NOTE — DISCHARGE NOTE NURSING/CASE MANAGEMENT/SOCIAL WORK - NSDCPEFALRISK_GEN_ALL_CORE
For information on Fall & Injury Prevention, visit: [email or web address] OR  [email or web address] OR  https://www.cdc.gov/steadi/patient.html

## 2023-11-22 NOTE — PROGRESS NOTE ADULT - SUBJECTIVE AND OBJECTIVE BOX
24H events:    Patient is a 28y old Male who presents with a chief complaint of cholestasis, abdominal ascites, hyponatremia (22 Nov 2023 06:57)    Primary diagnosis of Jaundice    Today is hospital day 6d. This morning patient was seen and examined at bedside, resting comfortably in bed.    No acute or major events overnight. Held off on paracentesis yesterday, largest pocket 5cm.    Code Status: full code    Family communication:  Contact date:  Name of person contacted:  Relationship to patient:  Communication details:  What matters most:    PAST MEDICAL & SURGICAL HISTORY  No significant past surgical history      SOCIAL HISTORY:  Social History:      ALLERGIES:  No Known Allergies    MEDICATIONS:  STANDING MEDICATIONS  albumin human 25% IVPB 75 Gram(s) IV Intermittent once  chlorhexidine 2% Cloths 1 Application(s) Topical daily  folic acid 1 milliGRAM(s) Oral daily  hepatitis B Vaccine - Adult (ENGERIX-B) 20 MICROGram(s) IntraMuscular once  influenza   Vaccine 0.5 milliLiter(s) IntraMuscular once  lactulose Syrup 20 Gram(s) Oral three times a day  midodrine. 5 milliGRAM(s) Oral three times a day  octreotide  Injectable 100 MICROGram(s) SubCutaneous three times a day  pantoprazole    Tablet 40 milliGRAM(s) Oral before breakfast  thiamine 100 milliGRAM(s) Oral daily    PRN MEDICATIONS  LORazepam   Injectable 2 milliGRAM(s) IntraMuscular every 2 hours PRN  traMADol 25 milliGRAM(s) Oral every 8 hours PRN    VITALS:   T(F): 98.1  HR: 100  BP: 95/50  RR: 18  SpO2: 95%    PHYSICAL EXAM:  GENERAL: jaundice, ill-appearing  ( + ) NAD, lying in bed comfortably     (  ) obtunded     (  ) lethargic     (  ) somnolent    HEAD: icteric  ( + ) Atraumatic     (  ) hematoma     (  ) laceration (specify location:       )     NECK:  ( + ) Supple     (  ) neck stiffness     (  ) nuchal rigidity     ( + )  no JVD     (  ) JVD present ( -- cm)    HEART:  Rate -->     (  +) normal rate     (  ) bradycardic     (  ) tachycardic  Rhythm -->     ( + ) regular     (  ) regularly irregular     (  ) irregularly irregular  Murmurs -->     (+  ) normal s1s2     (  ) systolic murmur     (  ) diastolic murmur     (  ) continuous murmur      (  ) S3 present     (  ) S4 present    LUNGS:   ( + )Unlabored respirations     (  ) tachypnea  ( + ) B/L air entry     (  ) decreased breath sounds in:  (location     )    (  ) no adventitious sound     (  ) crackles     (  ) wheezing      (  ) rhonchi      (specify location:       )  (  ) chest wall tenderness (specify location:       )    ABDOMEN:   ( + ) Soft     (  ) tense   |   (  ) nondistended     (+  ) distended   |   ( + ) +BS     (  ) hypoactive bowel sounds     (  ) hyperactive bowel sounds  ( + ) nontender     (  ) RUQ tenderness     (  ) RLQ tenderness     (  ) LLQ tenderness     (  ) epigastric tenderness     (  ) diffuse tenderness  (  ) Splenomegaly      ( + ) Hepatomegaly      ( + ) Jaundice     (  ) ecchymosis     EXTREMITIES: 2+ peripheral pulses bilaterally. No clubbing, cyanosis, or edema  (+  ) Normal     (  ) Rash     (  ) ecchymosis     (  ) varicose veins      (  ) pitting edema     (  ) non-pitting edema   (  ) ulceration     (  ) gangrene:     (location:     )    NERVOUS SYSTEM:    ( + ) A&Ox3     (  ) confused     (  ) lethargic  CN II-XII:     ( + ) Intact     (  ) deficits found     (Specify:     )   Upper extremities:     (  ) no sensorimotor deficits     (  ) weakness     (  ) loss of proprioception/vibration     (  ) loss of touch/temperature (specify:    )  Lower extremities:     (  ) no sensorimotor deficits     (  ) weakness     (  ) loss of proprioception/vibration     (  ) loss of touch/temperature (specify:    )    SKIN: jaundice  ( + ) No rashes or lesions     (  ) maculopapular rash     (  ) pustules     (  ) vesicles     (  ) ulcer     (  ) ecchymosis     (specify location:     )    AMPAC score:    ( - ) Indwelling Chung Catheter:   Date insterted:    Reason (  ) Critical illness     (  ) urinary retention    (  ) Accurate Ins/Outs Monitoring     (  ) CMO patient    ( - ) Central Line:     Date inserted:  Location: (  ) Right IJ     (  ) Left IJ     (  ) Right Fem     (  ) Left Fem    (  ) SPC        (  ) pigtail       (  ) PEG tube       (  ) colostomy       (  ) jejunostomy  (  ) U-Dall    LABS:                        9.3    20.20 )-----------( 217      ( 22 Nov 2023 04:50 )             26.5     11-22    127<L>  |  95<L>  |  51<H>  ----------------------------<  101<H>  3.8   |  14<L>  |  1.8<H>    Ca    8.2<L>      22 Nov 2023 04:50  Mg     2.3     11-21    TPro  5.2<L>  /  Alb  3.5  /  TBili  31.8<HH>  /  DBili  x   /  AST  130<H>  /  ALT  16  /  AlkPhos  157<H>  11-22    PT/INR - ( 22 Nov 2023 04:50 )   PT: 25.80 sec;   INR: 2.24 ratio         PTT - ( 22 Nov 2023 04:50 )  PTT:41.7 sec  Urinalysis Basic - ( 22 Nov 2023 04:50 )    Color: x / Appearance: x / SG: x / pH: x  Gluc: 101 mg/dL / Ketone: x  / Bili: x / Urobili: x   Blood: x / Protein: x / Nitrite: x   Leuk Esterase: x / RBC: x / WBC x   Sq Epi: x / Non Sq Epi: x / Bacteria: x            Culture - Blood (collected 19 Nov 2023 11:56)  Source: .Blood None  Preliminary Report (21 Nov 2023 22:01):    No growth at 48 Hours          RADIOLOGY:    < from: US Abdomen Complete (US Abdomen Complete .) (11.21.23 @ 16:48) >  Findings/  impression:  Moderate to large amount of abdominal pelvic ascites  < end of copied text >

## 2023-11-23 LAB
PROCALCITONIN SERPL-MCNC: 2.13 NG/ML — HIGH (ref 0.02–0.1)
PROCALCITONIN SERPL-MCNC: 2.13 NG/ML — HIGH (ref 0.02–0.1)

## 2023-11-24 LAB
CULTURE RESULTS: SIGNIFICANT CHANGE UP
CULTURE RESULTS: SIGNIFICANT CHANGE UP
HEV AB FLD QL: NEGATIVE — SIGNIFICANT CHANGE UP
HEV AB FLD QL: NEGATIVE — SIGNIFICANT CHANGE UP
SPECIMEN SOURCE: SIGNIFICANT CHANGE UP
SPECIMEN SOURCE: SIGNIFICANT CHANGE UP
STRONGYLOIDES AB SER-ACNC: NEGATIVE — SIGNIFICANT CHANGE UP
STRONGYLOIDES AB SER-ACNC: NEGATIVE — SIGNIFICANT CHANGE UP

## 2023-11-25 LAB
PETH 16:0/18:1: SIGNIFICANT CHANGE UP
PETH 16:0/18:1: SIGNIFICANT CHANGE UP
PETH 16:0/18:2: 22 NG/ML — HIGH
PETH 16:0/18:2: 22 NG/ML — HIGH
PETH COMMENTS: SIGNIFICANT CHANGE UP
PETH COMMENTS: SIGNIFICANT CHANGE UP

## 2023-11-27 LAB
CMV IGG AVIDITY SERPL IA-RTO: SIGNIFICANT CHANGE UP
CMV IGG AVIDITY SERPL IA-RTO: SIGNIFICANT CHANGE UP
CULTURE RESULTS: SIGNIFICANT CHANGE UP
SPECIMEN SOURCE: SIGNIFICANT CHANGE UP

## 2023-12-05 DIAGNOSIS — Z87.891 PERSONAL HISTORY OF NICOTINE DEPENDENCE: ICD-10-CM

## 2023-12-05 DIAGNOSIS — K52.9 NONINFECTIVE GASTROENTERITIS AND COLITIS, UNSPECIFIED: ICD-10-CM

## 2023-12-05 DIAGNOSIS — D64.9 ANEMIA, UNSPECIFIED: ICD-10-CM

## 2023-12-05 DIAGNOSIS — K76.0 FATTY (CHANGE OF) LIVER, NOT ELSEWHERE CLASSIFIED: ICD-10-CM

## 2023-12-05 DIAGNOSIS — Z80.8 FAMILY HISTORY OF MALIGNANT NEOPLASM OF OTHER ORGANS OR SYSTEMS: ICD-10-CM

## 2023-12-05 DIAGNOSIS — K72.00 ACUTE AND SUBACUTE HEPATIC FAILURE WITHOUT COMA: ICD-10-CM

## 2023-12-05 DIAGNOSIS — K70.31 ALCOHOLIC CIRRHOSIS OF LIVER WITH ASCITES: ICD-10-CM

## 2023-12-05 DIAGNOSIS — K70.11 ALCOHOLIC HEPATITIS WITH ASCITES: ICD-10-CM

## 2023-12-05 DIAGNOSIS — D68.9 COAGULATION DEFECT, UNSPECIFIED: ICD-10-CM

## 2023-12-05 DIAGNOSIS — J98.11 ATELECTASIS: ICD-10-CM

## 2023-12-05 DIAGNOSIS — E87.1 HYPO-OSMOLALITY AND HYPONATREMIA: ICD-10-CM

## 2023-12-05 DIAGNOSIS — K76.7 HEPATORENAL SYNDROME: ICD-10-CM

## 2023-12-05 DIAGNOSIS — N17.9 ACUTE KIDNEY FAILURE, UNSPECIFIED: ICD-10-CM

## 2023-12-05 DIAGNOSIS — R34 ANURIA AND OLIGURIA: ICD-10-CM

## 2023-12-05 DIAGNOSIS — K76.6 PORTAL HYPERTENSION: ICD-10-CM

## 2023-12-16 LAB
CULTURE RESULTS: SIGNIFICANT CHANGE UP
CULTURE RESULTS: SIGNIFICANT CHANGE UP
SPECIMEN SOURCE: SIGNIFICANT CHANGE UP
SPECIMEN SOURCE: SIGNIFICANT CHANGE UP

## 2024-02-23 NOTE — ED PROVIDER NOTE - CHIEF COMPLAINT
Quality 130: Documentation Of Current Medications In The Medical Record: Current Medications Documented Detail Level: Detailed Quality 226: Preventive Care And Screening: Tobacco Use: Screening And Cessation Intervention: Patient screened for tobacco use and is an ex/non-smoker Quality 431: Preventive Care And Screening: Unhealthy Alcohol Use - Screening: Patient not identified as an unhealthy alcohol user when screened for unhealthy alcohol use using a systematic screening method The patient is a 26y Male complaining of fall.

## 2024-04-17 NOTE — DISCHARGE NOTE PROVIDER - NSDCMRMEDTOKEN_GEN_ALL_CORE_FT
folic acid 1 mg oral tablet: 1 tab(s) orally once a day  lactulose 10 g/15 mL oral syrup: 30 milliliter(s) orally 3 times a day  midodrine 5 mg oral tablet: 1 tab(s) orally 3 times a day  octreotide 2500 mcg/mL subcutaneous solution: 0.04 milliliter(s) subcutaneous 3 times a day  thiamine 100 mg oral tablet: 1 tab(s) orally once a day   Yes folic acid 1 mg oral tablet: 1 tab(s) orally once a day  lactulose 10 g/15 mL oral syrup: 30 milliliter(s) orally 3 times a day  midodrine 5 mg oral tablet: 1 tab(s) orally 3 times a day  octreotide 2500 mcg/mL subcutaneous solution: 0.04 milliliter(s) subcutaneous 3 times a day  pantoprazole 40 mg oral delayed release tablet: 1 tab(s) orally once a day (before a meal)  thiamine 100 mg oral tablet: 1 tab(s) orally once a day

## 2024-07-04 ENCOUNTER — NON-APPOINTMENT (OUTPATIENT)
Age: 29
End: 2024-07-04

## 2024-08-22 NOTE — ED PROVIDER NOTE - IV ALTEPLASE INCLUSION HIDDEN
Pt was sent from [facility] for epigastric pain for the past 2 days. Pt states that the pain radiates to his back. Pt states +nausea, no vomiting, no fevers.    show